# Patient Record
Sex: MALE | Race: WHITE | Employment: OTHER | URBAN - METROPOLITAN AREA
[De-identification: names, ages, dates, MRNs, and addresses within clinical notes are randomized per-mention and may not be internally consistent; named-entity substitution may affect disease eponyms.]

---

## 2021-04-07 ENCOUNTER — HOSPITAL ENCOUNTER (OUTPATIENT)
Dept: WOUND CARE | Age: 66
Discharge: HOME | End: 2021-04-07
Attending: PODIATRIST
Payer: MEDICARE

## 2021-04-07 VITALS
BODY MASS INDEX: 22.2 KG/M2 | RESPIRATION RATE: 18 BRPM | WEIGHT: 130 LBS | DIASTOLIC BLOOD PRESSURE: 71 MMHG | SYSTOLIC BLOOD PRESSURE: 136 MMHG | HEIGHT: 64 IN | HEART RATE: 90 BPM | TEMPERATURE: 99.1 F

## 2021-04-07 DIAGNOSIS — L97.929 IDIOPATHIC CHRONIC VENOUS HYPERTENSION OF LEFT LOWER EXTREMITY WITH ULCER AND INFLAMMATION (HCC): ICD-10-CM

## 2021-04-07 DIAGNOSIS — I87.332 IDIOPATHIC CHRONIC VENOUS HYPERTENSION OF LEFT LOWER EXTREMITY WITH ULCER AND INFLAMMATION (HCC): ICD-10-CM

## 2021-04-07 DIAGNOSIS — I87.331 IDIOPATHIC CHRONIC VENOUS HYPERTENSION OF RIGHT LOWER EXTREMITY WITH ULCER AND INFLAMMATION (HCC): Primary | ICD-10-CM

## 2021-04-07 DIAGNOSIS — L97.412 ULCER OF RIGHT HEEL, WITH FAT LAYER EXPOSED (HCC): ICD-10-CM

## 2021-04-07 DIAGNOSIS — L97.422 ULCER OF LEFT HEEL, WITH FAT LAYER EXPOSED (HCC): ICD-10-CM

## 2021-04-07 DIAGNOSIS — L97.919 IDIOPATHIC CHRONIC VENOUS HYPERTENSION OF RIGHT LOWER EXTREMITY WITH ULCER AND INFLAMMATION (HCC): Primary | ICD-10-CM

## 2021-04-07 PROCEDURE — 99213 OFFICE O/P EST LOW 20 MIN: CPT

## 2021-04-07 RX ORDER — SILVER SULFADIAZINE 10 G/1000G
CREAM TOPICAL ONCE
Status: COMPLETED | OUTPATIENT
Start: 2021-04-07 | End: 2021-04-07

## 2021-04-07 RX ADMIN — SILVER SULFADIAZINE: 10 CREAM TOPICAL at 12:53

## 2021-04-07 NOTE — PROGRESS NOTES
[address] [place] [address] [address], [address] [facility] RECORD NUMBER:  [id number]  AGE: 60 y.o. RACE WHITE  GENDER: male  : 1955  EPISODE DATE:  2021    Subjective:     Chief Complaint   Patient presents with    Foot Problem     bilateral feet (PVD with ulcers)        Cherelle Naik is a 60 y.o. WHITE male who presents with a wound to the bilateral lower extremity - right foot to the dorsal midfoot and lateral heel, left foot at the dorsal midfoot. This began \"years ago\". He has MS and is wheelchair bound with fixed flexion of the right knee. He has home PT to help with this. He was evaluated by a vascular team (patient unsure which) and recommended stenting bilateral leg due to arterial disease. He refused and is getting a second opinion from another location next week. He does not use compression and notes significant edema that is improved with elevation. He uses silvadene cream to all areas and is placing it very thick. He performs this himself. HISTORY of PRESENT ILLNESS HPI    Nature: Painless  Location: as above  Duration: years  Onset: Patient states it started as unknown  Course: stbale  Aggravating/Alleviating: Worsened with dependency, improved with rest  Treatment: silvadene    Ulcer Identification:  Ulcer Type: venous    Contributing Factors: venous stasis, decreased mobility and arterial insufficiency    Wound: mixed venous and arterial         PAST MEDICAL HISTORY    Past Medical History:   Diagnosis Date    Chronic obstructive pulmonary disease ([address]     Ill-defined condition     MS        PAST SURGICAL HISTORY    History reviewed. No pertinent surgical history.     FAMILY HISTORY    Family History   Problem Relation Age of Onset    Stroke Mother        SOCIAL HISTORY    Social History     Tobacco Use    Smoking status: Former Smoker     Quit date: 2020     Years since quittin.3    Smokeless tobacco: Never Used   Substance Use Topics    Alcohol use: Never     Frequency: Never    Drug use: Not Currently       ALLERGIES    No Known Allergies    MEDICATIONS    No current outpatient medications on file prior to encounter. No current facility-administered medications on file prior to encounter. REVIEW OF SYSTEMS    Pertinent items are noted in HPI. Objective:     Visit Vitals  /71 (BP 1 Location: Right upper arm, BP Patient Position: Sitting)   Pulse 90   Temp 99.1 °F (37.3 °C)   Resp 18   Ht 5' 3.5\" (1.613 m)   Wt 59 kg (130 lb)   BMI 22.67 kg/m²       Wt Readings from Last 3 Encounters:   04/07/21 59 kg (130 lb)       PHYSICAL EXAM    General: well developed, well nourished, pleasant , NAD. Hygiene good  Psych: cooperative. Pleasant. No anxiety or depression. Normal mood and affect. HEENT: Normocephalic, atraumatic. EOMI. Conjunctiva clear. No scleral icterus. Neck: Normal range of motion. No masses. Chest: Good air entry bilaterally. Respirations nonlabored  Cardio: Normal heart sounds,no rubs, murmurs or gallops  Abdomen: Soft, nontender, nondistended, normoactive bowel sounds    Vascular: DP and PT pulses are nonpalpable at 0/4 bilateral. Skin temperature is warm to cold from proximal to distal bilateral. Hair growth is absent bilateral. No erythema, edema, heat is appreciated bilateral. No evidence of cellulitis. Derm: Nails 1-5 bilateral are thickened, discolored and with subungual debris. No paronychial infections are noted. Skin is atrophic and xerotic. No subcutaneous masses or hyperpigmented lesions are present. Neuro: Epicritic sensation is present bilateral. Protective sensation is absent with 5.07 SWMF testing to all 10 sites bilateral. Muscle tone and bulk is symmetric bilateral.  Msk:  ROM of all joints is full and fluid without pain. No effusions are palpable. Full thickness ulceration is noted to the  Dorsal midfoot bilateral and to the right lateral heel. Normal rim with fibrous base. No erythema, focal edema, purulence or odor. Soft end feel with no bone exposed or palpable. No undermining or sinus track is noted. No fluctuance with palpation. Global dependent edema bilateral.     Wound Foot Anterior; Left #1 left dorsal foot 04/07/21 (Active)   Wound Image   04/07/21 1130   Wound Etiology Arterial 04/07/21 1130   Dressing Status Clean;Dry; Intact 04/07/21 1130   Cleansed Soap and water 04/07/21 1130   Wound Length (cm) 3 cm 04/07/21 1130   Wound Width (cm) 6 cm 04/07/21 1130   Wound Depth (cm) 0.1 cm 04/07/21 1130   Wound Surface Area (cm^2) 18 cm^2 04/07/21 1130   Wound Volume (cm^3) 1.8 cm^3 04/07/21 1130   Wound Assessment [facility] 04/07/21 1130   Drainage Amount Moderate 04/07/21 1130   Drainage Description Serous 04/07/21 1130   Wound Odor None 04/07/21 1130   Hayde-Wound/Incision Assessment Edematous; Blanchable erythema 04/07/21 1130   Wound Thickness Description Full thickness 04/07/21 1130   Number of days: 0       Wound Foot Anterior;Right #2 right dorsal foot to include toes 04/07/21 (Active)   Wound Image   04/07/21 1130   Wound Etiology Arterial 04/07/21 1130   Dressing Status Clean;Dry; Intact 04/07/21 1130   Cleansed Soap and water 04/07/21 1130   Wound Length (cm) 2 cm 04/07/21 1130   Wound Width (cm) 1.5 cm 04/07/21 1130   Wound Depth (cm) 0.1 cm 04/07/21 1130   Wound Surface Area (cm^2) 3 cm^2 04/07/21 1130   Wound Volume (cm^3) 0.3 cm^3 04/07/21 1130   Drainage Amount Moderate 04/07/21 1130   Drainage Description Serous 04/07/21 1130   Wound Odor None 04/07/21 1130   Hayde-Wound/Incision Assessment Edematous; Blanchable erythema 04/07/21 1130   Wound Thickness Description Full thickness 04/07/21 1130   Number of days: 0       Wound Foot Anterior;Right #3 right lateral foot/heel 04/07/21 (Active)   Wound Image   04/07/21 1130   Wound Etiology Arterial 04/07/21 1130   Dressing Status Clean;Dry; Intact 04/07/21 1130   Cleansed Soap and water 04/07/21 1130 Wound Length (cm) 5 cm 04/07/21 1130   Wound Width (cm) 1.5 cm 04/07/21 1130   Wound Depth (cm) 0.1 cm 04/07/21 1130   Wound Surface Area (cm^2) 7.5 cm^2 04/07/21 1130   Wound Volume (cm^3) 0.75 cm^3 04/07/21 1130   Wound Assessment Granulation tissue; Epithelialization 04/07/21 1130   Drainage Amount Moderate 04/07/21 1130   Drainage Description Serous 04/07/21 1130   Wound Odor None 04/07/21 1130   Hayde-Wound/Incision Assessment Edematous; Blanchable erythema 04/07/21 1130   Wound Thickness Description Full thickness 04/07/21 1130   Number of days: 0       DATA REVIEW:  No results found for this or any previous visit (from the past 336 hour(s)). Assessment/Plan:      Problem List Items Addressed This Visit        Circulatory    Idiopathic chronic venous hypertension of right lower extremity with ulcer and inflammation ([address] - Primary    Idiopathic chronic venous hypertension of left lower extremity with ulcer and inflammation (HCC)       Other    Ulcer of right heel, with fat layer exposed ([address]    Ulcer of left heel, with fat layer exposed ([address]        Patient examined and evaluated. Educated patient and barriers to healing. Encouraged him to follow through with his second opinion and have intervention if recommended. Edema control through compression can not be performed until adequate arterial supply is obtained to avoid harm. Without compression his wounds will be slow to heal if at all. Infection control is required to prevent loss of limb/life - maintain dressing coverage at all times, do not soak or get wet in the shower, wash hands before and after dressing changes. Monitor for erythema, edema, odor, and thick yellow drainage and contact the center immediately if noted. Proper nutrition to support skin growth includes increased protein, vitamins and amino acids - animal based foods and Kulwinder recommended.  Moisture management to prevent maceration and dryness - keep wound covered at all times outside dressing care, do not \"air out\" or soak. Dressing will consist of hydrofera ready three times a week. Patient instructed on the following: Follow all wound dressing instructions. Do not get dressing or wound wet. May shower if wound can be effectively kept dry. Cast covers may be purchased at [facility] and \[facility] [place] [facility]\"". Should you experience increased redness, swelling, pain, foul odor, size of wound(s), or have a temperature over 101 degrees please contact the [address] [place] [address] Road at [phone] or if after hours contact your primary care physician or go to the hospital emergency department. Orders Placed This Encounter    WOUND CARE, DRESSING CHANGE     Right dorsal foot/ lateral foot to include heel and Left dorsal foot. Cleanse wound and periwound with wound cleanser or normal saline. Hydrofera Ready: Cut to wound size, place in wound bed, shiny side out. Secure with abd and rolled gauze. Change 3 x weekly ( Monday, Wednesday and Friday)    May apply small amount of silvadene cream to any open areas on toes of both feel. Secure with gauze. Admit to home health for 2 to 3 x weekly dressing changes. Follow up with Vascular appointment as previously scheduled.      Standing Status:   Standing     Number of Occurrences:   1    silver sulfADIAZINE (SILVADENE) 1 % topical cream       Follow-up Information     Follow up With Specialties Details Why Contact Info     [facility] In 1 week  [facility] [address] [facility] [address] [phone]         MDM  Number of Diagnoses or Management Options  Idiopathic chronic venous hypertension of left lower extremity with ulcer and inflammation ([address]): new, no workup  Idiopathic chronic venous hypertension of right lower extremity with ulcer and inflammation ([address]): new, no workup  Ulcer of left heel, with fat layer exposed ([address]): new, no workup  Ulcer of right heel, with fat layer exposed ([address]): new, no workup     Amount and/or Complexity of Data Reviewed  Obtain history from someone other than the patient: yes    Risk of Complications, Morbidity, and/or Mortality  Presenting problems: moderate  Management options: moderate    Patient Progress  Patient progress: (new)      Treatment Note please see attached Discharge Instructions    Written patient dismissal instructions given to patient or POA.          Electronically signed by Mckenzie Scales DPM on 4/7/2021 at 12:05 PM

## 2021-04-07 NOTE — WOUND CARE
[address]  [address], [address] [facility]  Phone: [phone]  Fax: [phone]    Patient: Brice Ballesteros MRN: [id number]  SSN: [id number]    YOB: 1955  Age: 60 y.o. Sex: male       Return Appointment: 1 week with Chet Segura DPM    Instructions: Right dorsal foot/ lateral foot to include heel and Left dorsal foot. Cleanse wound and periwound with wound cleanser or normal saline. Hydrofera Ready: Cut to wound size, place in wound bed, shiny side out. Secure with abd and rolled gauze. Change 3 x weekly ( Monday, Wednesday and Friday)    May apply small amount of silvadene cream to any open areas on toes of both feel. Secure with gauze. Admit to home health for 2 to 3 x weekly dressing changes. Follow up with Vascular appointment as previously scheduled. Should you experience increased redness, swelling, pain, foul odor, size of wound(s), or have a temperature over 101 degrees please contact the [address] [place] [address] Road at [phone] or if after hours contact your primary care physician or go to the hospital emergency department.     Signed By: Karyle Hartigan     April 7, 2021

## 2021-04-07 NOTE — DISCHARGE INSTRUCTIONS
[place] [address]  [address], [address]  Phone: [phone]  Fax: [phone]    Patient: Casey Suggs MRN: [id number]  SSN: [id number]    YOB: 1955  Age: 60 y.o. Sex: male       Return Appointment: 1 week with Nikko Burroughs DPM    Instructions: Right dorsal foot/ lateral foot to include heel and Left dorsal foot. Cleanse wound and periwound with wound cleanser or normal saline. Hydrofera Ready: Cut to wound size, place in wound bed, shiny side out. Secure with abd and rolled gauze. Change 3 x weekly ( Monday, Wednesday and Friday)    May apply small amount of silvadene cream to any open areas on toes of both feel. Secure with gauze. Admit to home health for 2 to 3 x weekly dressing changes. Follow up with Vascular appointment as previously scheduled. Should you experience increased redness, swelling, pain, foul odor, size of wound(s), or have a temperature over 101 degrees please contact the [address] [place] [address] Road at [phone] or if after hours contact your primary care physician or go to the hospital emergency department.     Signed By: Johny Ann     April 7, 2021

## 2021-05-26 ENCOUNTER — TELEPHONE (OUTPATIENT)
Dept: WOUND CARE | Age: 66
End: 2021-05-26

## 2021-05-26 NOTE — TELEPHONE ENCOUNTER
Rec'd VM from [facility] with [facility], stating that patient's insurance only allows for 1x weekly visits. A Caregiver is in the home and is to change on alternate days, however the patient has refused to have the hydrofera blue changed resulting in the dressing staying on for 5 days.

## 2021-09-29 ENCOUNTER — HOSPITAL ENCOUNTER (OUTPATIENT)
Dept: WOUND CARE | Age: 66
Discharge: HOME | End: 2021-09-29
Attending: PODIATRIST

## 2021-09-29 VITALS
WEIGHT: 130 LBS | HEART RATE: 90 BPM | HEIGHT: 64 IN | DIASTOLIC BLOOD PRESSURE: 74 MMHG | RESPIRATION RATE: 18 BRPM | SYSTOLIC BLOOD PRESSURE: 137 MMHG | TEMPERATURE: 97.2 F | OXYGEN SATURATION: 96 % | BODY MASS INDEX: 22.2 KG/M2

## 2021-09-29 NOTE — WOUND CARE
[address]  [address], [address]  Phone: [phone]  Fax: [phone]    Patient: Mckinley Ramos MRN: [id number]  SSN: [id number]    YOB: 1955  Age: 73 y.o. Sex: male         Patient Redirected to Appointment:    Patient had previously scheduled appointment with Rose for 09/29/2021 at [facility].  Phone Call made to Dr. Ashley Land office to confirm [facility] appointment date and time. Dr. Gilson Cole Specialist encouraged patient to still come today. Address and phone number provided to patient. Wound Visit conlcluded with light cover dressing to dorsal foot.           Signed By: Maddy Mayorga RN     September 29, 2021

## 2021-09-29 NOTE — WOUND CARE
09/29/21 1326   Wound Foot Anterior; Left #1 left dorsal foot 04/07/21   Date First Assessed/Time First Assessed: 04/07/21 1130   Present on Hospital Admission: Yes  Location: Foot  Wound Location Orientation: Anterior; Left  Wound Description: #1 left dorsal foot  Date of First Observation: 04/07/21   Wound Image    Wound Etiology Arterial   Dressing Status Old drainage noted   Cleansed Cleansed with saline; Soap and water   Wound Length (cm) 4 cm   Wound Width (cm) 3 cm   Wound Depth (cm) 0.1 cm   Wound Surface Area (cm^2) 12 cm^2   Change in Wound Size % 33.33   Wound Volume (cm^3) 1.2 cm^3   Wound Healing % 33   Wound Assessment Erythema;Granulation tissue   Drainage Amount Small   Drainage Description Serosanguinous   Wound Odor None   Hayde-Wound/Incision Assessment Blanchable erythema;Edematous   Edges Attached edges   Wound Thickness Description Full thickness     Patient is not on ANTICOAGULANT. Wound mechanically debrided with gauze and normal saline.

## 2021-11-09 ENCOUNTER — HOSPITAL ENCOUNTER (OUTPATIENT)
Dept: WOUND CARE | Age: 66
Discharge: HOME | End: 2021-11-09
Attending: PODIATRIST
Payer: MEDICARE

## 2021-11-09 VITALS
OXYGEN SATURATION: 96 % | RESPIRATION RATE: 18 BRPM | WEIGHT: 130 LBS | TEMPERATURE: 96.1 F | BODY MASS INDEX: 22.2 KG/M2 | DIASTOLIC BLOOD PRESSURE: 76 MMHG | HEART RATE: 90 BPM | HEIGHT: 64 IN | SYSTOLIC BLOOD PRESSURE: 154 MMHG

## 2021-11-09 DIAGNOSIS — L97.929 IDIOPATHIC CHRONIC VENOUS HYPERTENSION OF LEFT LOWER EXTREMITY WITH ULCER AND INFLAMMATION (HCC): ICD-10-CM

## 2021-11-09 DIAGNOSIS — L97.422 ULCER OF LEFT HEEL, WITH FAT LAYER EXPOSED (HCC): ICD-10-CM

## 2021-11-09 DIAGNOSIS — L97.919 IDIOPATHIC CHRONIC VENOUS HYPERTENSION OF RIGHT LOWER EXTREMITY WITH ULCER AND INFLAMMATION (HCC): ICD-10-CM

## 2021-11-09 DIAGNOSIS — L97.412 ULCER OF RIGHT HEEL, WITH FAT LAYER EXPOSED (HCC): ICD-10-CM

## 2021-11-09 DIAGNOSIS — G35 MULTIPLE SCLEROSIS (HCC): ICD-10-CM

## 2021-11-09 DIAGNOSIS — I87.331 IDIOPATHIC CHRONIC VENOUS HYPERTENSION OF RIGHT LOWER EXTREMITY WITH ULCER AND INFLAMMATION (HCC): ICD-10-CM

## 2021-11-09 DIAGNOSIS — I87.332 IDIOPATHIC CHRONIC VENOUS HYPERTENSION OF LEFT LOWER EXTREMITY WITH ULCER AND INFLAMMATION (HCC): ICD-10-CM

## 2021-11-09 PROCEDURE — 99213 OFFICE O/P EST LOW 20 MIN: CPT

## 2021-11-09 PROCEDURE — 99203 OFFICE O/P NEW LOW 30 MIN: CPT | Performed by: PHYSICAL MEDICINE & REHABILITATION

## 2021-11-09 RX ORDER — ASPIRIN 325 MG
325 TABLET ORAL DAILY
COMMUNITY

## 2021-11-09 NOTE — PROGRESS NOTES
[address], [address], [address] [place] [address] [address]  [phone]    Progress Notes   Avery Juares       MRN: [id number]     : 1955     Age: 73 y.o. Subjective/HPI:   This patient is a 73 y.o. male seen and evaluated at the wound clinic for left dorsal foot chronic ulcerations. He was referred to us by Dr. Brittani Morris at [facility] care where he was just seen recently 2021. Dr. Brittani Morris has followed this patient along with home health nursing for care of his left recurrent dorsal foot ulcerations. He has had no recent fever or chills but he states that the ulcerations have been intermittent since . He admits that he spends most of his time up in a power wheelchair without elevating leg rest.  He does not like to wear compression. He does not sleep in his bed and frequently just sleeps in his chair. He cannot quantify how many hours a day that he is up in his chair but he states that it is upon awakening and at bedtime or sometimes he sleeps in it 24 hours. He has a history of multiple sclerosis and is a nonambulator. He has a history of peripheral arterial disease followed by Dr Brittani Morris. Smoking approximately 1 year ago. On health has been applying Hydrofera Blue to the wound on the left dorsal foot but the patient complains that it sticks frequently and causes further skin trauma sometimes. .    Review of Systems  A comprehensive review of systems was negative except for that written in the HPI. Past Medical History:   Diagnosis Date    Chronic obstructive pulmonary disease ([facility] [address]     Ill-defined condition     MS      History reviewed. No pertinent surgical history.    No Known Allergies   Social History     Tobacco Use    Smoking status: Former Smoker     Quit date: 2020     Years since quittin.9    Smokeless tobacco: Never Used    Tobacco comment: continues non-smoking status   Substance Use Topics    Alcohol use: Never Social History     Social History Narrative    Not on file     Family History   Problem Relation Age of Onset    Stroke Mother       Cannot display prior to admission medications because the patient has not been admitted in this contact. Current Outpatient Medications   Medication Sig    aspirin (ASPIRIN) 325 mg tablet Take 325 mg by mouth daily. No current facility-administered medications for this encounter. Objective:     Vitals:    11/09/21 1131   BP: (!) 154/76   Pulse: 90   Resp: 18   Temp: (!) 96.1 °F (35.6 °C)   SpO2: 96%   Weight: 130 lb (59 kg)   Height: 5' 3.5\" (1.613 m)       Physical Exam:  BMI: 22.67  Ambulation: Nonambulatory  Gen- the patient is well developed and in no acute distress  Lungs- normal respiratory effort. Cardiovascular:  Lower limb pulses: Difficult to palpate due to the location of the left foot wound. .   Abd- soft and no masses evident. Ext- warm without cyanosis. There is chronic dependent lower leg edema that appears uncontrolled. Skin- no jaundice or rashes. Please see the specific measurements and descriptions of the wound(s) below. There is no evidence of periwound induration or warmth. No purulence or odor. Neuro- alert and oriented x 3. He has increased spasticity in the lower extremities with sustained clonus at times at the ankles. He is followed by neurology at [facility]. Psychological: Affect normal. Mood normal. Memory intact. Wound Foot Anterior; Left #1 left dorsal foot 04/07/21 (Active)   Wound Image   11/09/21 1146   Wound Etiology Venous 11/09/21 1146   Dressing Status Old drainage noted 11/09/21 1146   Cleansed Cleansed with saline;  Soap and water 11/09/21 1146   Dressing/Treatment Open to air 11/09/21 1146   Wound Length (cm) 2.6 cm 11/09/21 1146   Wound Width (cm) 3.5 cm 11/09/21 1146   Wound Depth (cm) 0.2 cm 11/09/21 1146   Wound Surface Area (cm^2) 9.1 cm^2 11/09/21 1146   Change in Wound Size % 49.44 11/09/21 1146   Wound Volume (cm^3) 1.82 cm^3 11/09/21 1146   Wound Healing % -1 11/09/21 1146   Wound Assessment Granulation tissue; [facility] 11/09/21 1146   Drainage Amount Scant 11/09/21 1146   Drainage Description Serosanguinous 11/09/21 1146   Wound Odor None 11/09/21 1146   Hayde-Wound/Incision Assessment Blanchable erythema; Edematous 11/09/21 1146   Edges Attached edges 11/09/21 1146   Wound Thickness Description Full thickness 11/09/21 1146   Number of days: 216           Vascular Lower Extremities Arterial Duplex Procedure 12/23/2020-[facility]     Demographics      Patient Name:            DIANA VACA       Age:          65 year(s)      MRN:                     [id number]            Date of       1955                                                  Birth:      Account Number:         [de-identified]         Gender:       Male      Corporate ID:            [id number]            Accession     [id number]                                                  Number:      Height:                  63 inches            Weight:       131 Lb      Date of Study:           12/23/2020      Study Location:          [facility] Number:  [id number]                             CENTER.      Referring Physician:     AXEL DOMINGUEZ Physician:                             ELHAM VILLALOBOS      Interpreting/Reading     **[facility]     Vascular     [facility]    Physician:               Juan Carlos Barrios MD      Tech:     Procedure   Type of Study:      Extremities Arteries: Lower Extremities Arterial Duplex, US Doppler    Arterial Legs Bilateral with TRICE. Risk Factors       - The patient has a current/recent (within 1 year) tobacco history. Exam Status:Routine. Study [address] Technical Quality:Technically difficult study. .      Conclusions      Summary      Limited Exam...pls refer to tech notes.      Occluded right mid SFA      Elevated velocities on left proximal and distal SFA suggestive of moderate    stenosis      Signature      ----------------------------------------------------------------    Electronically signed by Latoya Armando MD(Reading Physician) on    12/23/2020 04:46 PM    ----------------------------------------------------------------     Technical Comments   Right Impression   Duplex imaging of the right lower extremity demonstrates atherosclerotic   plqaue and intimal thickening. Color flow and Doppler signals are absent in the right mid superficial   femoral artery. Elevated velocities noted in the right distal external iliac artery and deep   femoral artery. Doppler waveforms are biphasic and monophasic throughout at rest.   Left Impression   Duplex imaging of the left lower extremity demonstrates atherosclerotic   plqaue and intimal thickening. Elevated velocities noted in the left distal external iliac artery and in   the proximal and distal superficial femoral arteries. Doppler waveforms are triphasic and biphasic throughout at rest.     Velocities are measured in cm/s ; Diameters are measured in cm     LE Duplex Measurements     +------------------++-----+-----+----+----------++---+-----+---+-----------+   !                  ! ! Right!     !Left!          !!   !     !   !           !   +------------------++-----+-----+----+----------++---+-----+---+-----------+   ! Location          !!PSV  !Ratio! EDV ! Wave Desc. !!PSV! Ratio! EDV! Wave Desc. !   +------------------++-----+-----+----+----------++---+-----+---+-----------+   ! Dist EIA          !!182  !     !    !Biphasic  !!173!     !   !Biphasic   !   +------------------++-----+-----+----+----------++---+-----+---+-----------+   ! Common Femoral    !!131  !     !    !Biphasic  !!110!     !   !Biphasic   !   +------------------++-----+-----+----+----------++---+-----+---+-----------+   ! PFA               !!246  !1.88 !    !Biphasic  !!90 !0.82 !   !Triphasic  ! +------------------++-----+-----+----+----------++---+-----+---+-----------+   ! Prox SFA          !!80   !     !    !Biphasic  !!856!     !   !Biphasic   !   +------------------++-----+-----+----+----------++---+-----+---+-----------+   ! Mid SFA           !!0    !0    !    !Absent    !!139!0.57 !   !Biphasic   !   +------------------++-----+-----+----+----------++---+-----+---+-----------+   ! Dist SFA          !!172  !     !    !Biphasic  !!506!5.45 !   !Biphasic   !   +------------------++-----+-----+----+----------++---+-----+---+-----------+   ! Prox Popliteal    !!422  !0.72 !    !Monophasic!!158!0.83 !   !Triphasic  !   +------------------++-----+-----+----+----------++---+-----+---+-----------+   ! Dist Popliteal    !!69   !0.56 !    !Monophasic!!124!0.78 !   !Biphasic   !   +------------------++-----+-----+----+----------++---+-----+---+-----------+   ! Prox PTA          !!42   !0.61 !    !Monophasic!!94 !0.76 !   !Biphasic   !   +------------------++-----+-----+----+----------++---+-----+---+-----------+   ! Dist PTA          !!47   !1.12 !    !Monophasic!!128!1.36 !   !Biphasic   !   +------------------++-----+-----+----+----------++---+-----+---+-----------+   ! Prox TAMMY          !!44   !0.64 !    !Monophasic!!60 !0.48 !   !Biphasic   !   +------------------++-----+-----+----+----------++---+-----+---+-----------+   ! DP                !!35   !     !    !Monophasic! !63 !     !   !Biphasic   !   +------------------++-----+-----+----+----------++---+-----+---+-----------+   ! Prox Peroneal     !!     !     !    !          !!71 !0.76 !   !Biphasic   !   +------------------++-----+-----+----+----------++---+-----+---+-----------+    Other Result Text    This result has an attachment that is not available. Interface, Radiology Results Cupid - 12/23/2020  4:46 PM EST   Vascular Lower Extremities Arterial Duplex Procedure      Demographics      Patient Name:            DIANA VACA       Age:          65 year(s)  MRN:                     [id number]            Date of       1955                                                  Birth:      Account Number:         [de-identified]         Gender:       Male      Corporate ID:            [id number]            Accession     [id number]                                                  Number:      Height:                  63 inches            Weight:       131 Lb      Date of Study:           12/23/2020      Study Location:          [facility] Number:  [id number]                             CENTER.      Referring Physician:     AXEL DOMINGUEZ Physician:                             Sesar Vazquez MD      Interpreting/Reading     **[facility]     Vascular     [facility] Brass    Physician:               Sarah Jung MD      Tech:     Procedure   Type of Study:      Extremities Arteries: Lower Extremities Arterial Duplex, US Doppler    Arterial Legs Bilateral with TRICE. Risk Factors       - The patient has a current/recent (within 1 year) tobacco history. Exam Status:Routine. Study [address] Technical Quality:Technically difficult study. .      Conclusions      Summary      Limited Exam...pls refer to tech notes.      Occluded right mid SFA      Elevated velocities on left proximal and distal SFA suggestive of moderate    stenosis      Signature       Assessment:     Patient Active Problem List   Diagnosis Code    Idiopathic chronic venous hypertension of right lower extremity with ulcer and inflammation ([address] I87.331, L97.919    Idiopathic chronic venous hypertension of left lower extremity with ulcer and inflammation (HCC) I87.332, L97.929    Ulcer of right heel, with fat layer exposed ([address] L97.412    Ulcer of left heel, with fat layer exposed ([address] L97.422     Plan:   Return Appointment: 4 weeks with Juan R Curtis MD     Instructions: Left Lower Leg:  Cleanse wound and periwound with wound cleanser or normal saline.   Zinc Oxide 40% Cream such as Desitin to periwound. Alginate with silver(sheets); cut to size, apply to wound bed. Cover with ABD, wrap with Rolled Gauze  Dressing change 3x weekly  [facility] for wound care. Tubigrip to both lower legs  Elevate lower legs when at rest.   Avoid sitting in chair with legs down all day. Avoid sleeping in chair. Sleep in bed with legs elevated. Wear Compression Socks. May find at [employer]' stores locally, such as [employer], [employer], [employer], etc.  Continue with Meals on Wheels. Focus on protein intake.       Follow-up Information     Follow up With Specialties Details Why Contact Info    [address] In 4 weeks For wound re-check [address] [facility] [address] [phone]        This patient has a history of multiple sclerosis and is nonambulatory and has significant lower limb spasticity that may also contribute to recurrent ulcerations. He uses a power wheelchair with a dependent foot plate and admits that he does not elevate his legs. I have strongly advised that if he wants to heal the wounds making a plan to transfer into his bed nightly and elevating his legs on the bed is essential.  I have also advised that he could use light compression knee-high socks such as those found at [employer] or in a sporting [employer] store to give him some compression that is still easy enough for donning and doffing. I will switch him over to a silver alginate on the wound bed and a barrier cream around that and elevation and I will see him back in 4 weeks. TIME:  If total time for today's E/M service is used for level of service it is documented below.     This time includes physician non-face-to-face service time visit on the date of service and includes    Preparing to see the patient (eg, review of tests)  Obtaining and/or reviewing separately obtained history  Performing a medically necessary appropriate examination and/or evaluation  Counseling and educating the patient/family/caregiver  Ordering medications, tests, or procedures  Referring and communicating with other health care professionals as needed  Documenting clinical information in the electronic or other health record  Independently interpreting results (not reported separately) and communicating results to the patient/family/caregiver  Care coordination (not reported separately)    E/M time =    38      minutes. Dictated using voice recognition software. Proofread, but unrecognized errors may exist.       Thank you very much for the opportunity to participate in this patient's care.       Signed By: Willi Schafer MD     November 9, 2021

## 2021-11-09 NOTE — WOUND CARE
[address]  [address], [address]  Phone: [phone]  Fax: [phone]    Patient: Jose G Hogan MRN: [id number]  SSN: [id number]    YOB: 1955  Age: 73 y.o. Sex: male       Return Appointment: 4 weeks with Katherin Olivera MD    Instructions: Left Lower Leg:  Cleanse wound and periwound with wound cleanser or normal saline. Zinc Oxide 40% Cream such as Desitin to periwound. Alginate with silver(sheets); cut to size, apply to wound bed. Cover with ABD, wrap with Rolled Gauze  Dressing change 3x weekly  [facility] for wound care. Tubigrip to both lower legs  Elevate lower legs when at rest.   Avoid sitting in chair with legs down all day. Avoid sleeping in chair. Sleep in bed with legs elevated. Wear Compression Socks. May find at [employer] stores locally, such as [employer], [employer], [employer], etc.  Continue with Meals on Wheels. Focus on protein intake. Should you experience increased redness, swelling, pain, foul odor, size of wound(s), or have a temperature over 101 degrees please contact the [address] at [phone] or if after hours contact your primary care physician or go to the hospital emergency department.     Signed By: Rehan Archer RN     November 9, 2021

## 2021-11-09 NOTE — DISCHARGE INSTRUCTIONS
[address]  [address], [address]   Phone: [phone]  Fax: [phone]    Patient: Sabrina Charles MRN: [id number]  SSN: [id number]    YOB: 1955  Age: 73 y.o. Sex: male       Return Appointment: 4 weeks with Halie Batista MD    Instructions: Left Lower Leg:  Cleanse wound and periwound with wound cleanser or normal saline. Zinc Oxide 40% Cream such as Desitin to periwound. Alginate with silver(sheets); cut to size, apply to wound bed. Cover with ABD, wrap with Rolled Gauze  Dressing change 3x weekly  [facility] for wound care. Tubigrip to both lower legs  Elevate lower legs when at rest.   Avoid sitting in chair with legs down all day. Avoid sleeping in chair. Sleep in bed with legs elevated. Wear Compression Socks. May find at [employer] stores locally, such as [employer], [employer], [employer], etc.  Continue with Meals on Wheels. Focus on protein intake. Should you experience increased redness, swelling, pain, foul odor, size of wound(s), or have a temperature over 101 degrees please contact the [address] at [phone] or if after hours contact your primary care physician or go to the hospital emergency department.     Signed By: Kimberley Johnston RN     November 9, 2021

## 2021-11-09 NOTE — WOUND CARE
11/09/21 1146   Wound Foot Anterior; Left #1 left dorsal foot 04/07/21   Date First Assessed/Time First Assessed: 04/07/21 1130   Present on Hospital Admission: Yes  Location: Foot  Wound Location Orientation: Anterior; Left  Wound Description: #1 left dorsal foot  Date of First Observation: 04/07/21   Wound Image    Wound Etiology Venous   Dressing Status Old drainage noted   Cleansed Cleansed with saline; Soap and water   Dressing/Treatment Open to air   Wound Length (cm) 2.6 cm   Wound Width (cm) 3.5 cm   Wound Depth (cm) 0.2 cm   Wound Surface Area (cm^2) 9.1 cm^2   Change in Wound Size % 49.44   Wound Volume (cm^3) 1.82 cm^3   Wound Healing % -1   Wound Assessment Granulation tissue; Slough   Drainage Amount Scant   Drainage Description Serosanguinous   Wound Odor None   Hayde-Wound/Incision Assessment Blanchable erythema; Edematous   Edges Attached edges   Wound Thickness Description Full thickness     Patient is taking ANTICOAGULANT:  Daily  Wound mechanically debrided with gauze and normal saline.

## 2022-01-04 ENCOUNTER — HOSPITAL ENCOUNTER (OUTPATIENT)
Dept: WOUND CARE | Age: 67
Discharge: HOME | End: 2022-01-04
Attending: PODIATRIST
Payer: MEDICARE

## 2022-01-04 VITALS
OXYGEN SATURATION: 93 % | RESPIRATION RATE: 18 BRPM | HEIGHT: 64 IN | WEIGHT: 130 LBS | SYSTOLIC BLOOD PRESSURE: 151 MMHG | TEMPERATURE: 97.9 F | HEART RATE: 88 BPM | DIASTOLIC BLOOD PRESSURE: 84 MMHG | BODY MASS INDEX: 22.2 KG/M2

## 2022-01-04 DIAGNOSIS — L97.919 IDIOPATHIC CHRONIC VENOUS HYPERTENSION OF RIGHT LOWER EXTREMITY WITH ULCER AND INFLAMMATION (HCC): ICD-10-CM

## 2022-01-04 DIAGNOSIS — G35 MS (MULTIPLE SCLEROSIS) (HCC): ICD-10-CM

## 2022-01-04 DIAGNOSIS — L97.929 IDIOPATHIC CHRONIC VENOUS HYPERTENSION OF LEFT LOWER EXTREMITY WITH ULCER AND INFLAMMATION (HCC): ICD-10-CM

## 2022-01-04 DIAGNOSIS — I87.331 IDIOPATHIC CHRONIC VENOUS HYPERTENSION OF RIGHT LOWER EXTREMITY WITH ULCER AND INFLAMMATION (HCC): ICD-10-CM

## 2022-01-04 DIAGNOSIS — L97.422 ULCER OF LEFT HEEL, WITH FAT LAYER EXPOSED (HCC): Primary | ICD-10-CM

## 2022-01-04 DIAGNOSIS — I73.9 PAD (PERIPHERAL ARTERY DISEASE) (HCC): ICD-10-CM

## 2022-01-04 DIAGNOSIS — L97.522 CHRONIC FOOT ULCER, LEFT, WITH FAT LAYER EXPOSED (HCC): ICD-10-CM

## 2022-01-04 DIAGNOSIS — I87.332 IDIOPATHIC CHRONIC VENOUS HYPERTENSION OF LEFT LOWER EXTREMITY WITH ULCER AND INFLAMMATION (HCC): ICD-10-CM

## 2022-01-04 DIAGNOSIS — L97.512 RIGHT FOOT ULCER, WITH FAT LAYER EXPOSED (HCC): ICD-10-CM

## 2022-01-04 PROBLEM — L97.412 ULCER OF RIGHT HEEL, WITH FAT LAYER EXPOSED (HCC): Status: RESOLVED | Noted: 2021-04-07 | Resolved: 2022-01-04

## 2022-01-04 PROCEDURE — 99213 OFFICE O/P EST LOW 20 MIN: CPT | Performed by: PHYSICAL MEDICINE & REHABILITATION

## 2022-01-04 PROCEDURE — 99213 OFFICE O/P EST LOW 20 MIN: CPT

## 2022-01-04 NOTE — PROGRESS NOTES
[address], [address], [address]  [phone]    Progress Notes   Estephanie Gray       MRN: [id number]     : 1955     Age: 73 y.o. Subjective/HPI:   This patient is a 73 y.o. male seen and evaluated at the wound clinic for recheck of the left dorsal foot ulceration. In the interim since last visit, he developed ulceration to the right dorsal foot also in the same  Location. He states that he frequently traumatizes his feet in his power wheelchair, striking them on furniture or getting them caught. He also has severe lower limb spasticity but will not take baclofen or other meds for spasticity. He lives alone and has home health nursing care for his wounds. No fever or chills. Review of Systems  A comprehensive review of systems was negative except for that written in the HPI. Past Medical History:   Diagnosis Date    Chronic obstructive pulmonary disease ([facility] [address]     Ill-defined condition     MS      History reviewed. No pertinent surgical history. No Known Allergies   Social History     Tobacco Use    Smoking status: Former Smoker     Quit date: 2020     Years since quittin.0    Smokeless tobacco: Never Used    Tobacco comment: continues non-smoking status   Substance Use Topics    Alcohol use: Never      Social History     Social History Narrative    Not on file     Family History   Problem Relation Age of Onset    Stroke Mother       Cannot display prior to admission medications because the patient has not been admitted in this contact. Current Outpatient Medications   Medication Sig    aspirin (ASPIRIN) 325 mg tablet Take 325 mg by mouth daily. No current facility-administered medications for this encounter.      Objective:     Vitals:    22 1021   BP: (!) 151/84   Pulse: 88   Resp: 18   Temp: 97.9 °F (36.6 °C)   SpO2: 93%   Weight: 130 lb (59 kg)   Height: 5' 3.5\" (1.613 m)       Physical Exam:  BMI: 22.67  Ambulation: non ambulatory  Gen- the patient is well developed and in no acute distress  Lungs- normal respiratory effort. Cardiovascular:  Lower limb pulses: cold with slow cap refill in the left foot. Abd- soft and no masses evident. Ext- warm without cyanosis. There is dependent lower leg edema that appears not controlled  Skin- no jaundice or rashes. Please see the specific measurements and descriptions of the wound(s) below. There is no evidence of periwound induration or warmth. No purulence or odor. Neuro- alert and oriented x 3. Gross imotor deficits are present. Lower limb sensation is impaired. Algis Broaden Psychological: Affect normal. Mood normal. Memory intact. Wound Foot Anterior; Left #1 left dorsal foot 04/07/21 (Active)   Wound Image   01/04/22 1023   Wound Etiology Venous 01/04/22 1023   Dressing Status Old drainage noted 01/04/22 1023   Cleansed Cleansed with saline 01/04/22 1023   Dressing/Treatment Open to air 11/09/21 1146   Wound Length (cm) 2.5 cm 01/04/22 1023   Wound Width (cm) 3 cm 01/04/22 1023   Wound Depth (cm) 0.1 cm 01/04/22 1023   Wound Surface Area (cm^2) 7.5 cm^2 01/04/22 1023   Change in Wound Size % 58.33 01/04/22 1023   Wound Volume (cm^3) 0.75 cm^3 01/04/22 1023   Wound Healing % 58 01/04/22 1023   Wound Assessment [place]/red;Slough 01/04/22 1023   Drainage Amount Scant 01/04/22 1023   Drainage Description Serosanguinous 01/04/22 1023   Wound Odor None 01/04/22 1023   Hayde-Wound/Incision Assessment Edematous; Cool 01/04/22 1023   Edges Attached edges 11/09/21 1146   Wound Thickness Description Full thickness 01/04/22 1023   Number of days: 272       Wound Foot Anterior;Right (Active)   Wound Image   01/04/22 1023   Wound Etiology Traumatic 01/04/22 1023   Cleansed Soap and water 01/04/22 1023   Dressing/Treatment Open to air 01/04/22 1023   Wound Length (cm) 0.8 cm 01/04/22 1023   Wound Width (cm) 0.9 cm 01/04/22 1023   Wound Depth (cm) 0.1 cm 01/04/22 1023   Wound Surface Area (cm^2) 0.72 cm^2 01/04/22 1023   Wound Volume (cm^3) 0.072 cm^3 01/04/22 1023   Wound Assessment Pink/red 01/04/22 1023   Drainage Amount Scant 01/04/22 1023   Drainage Description Serosanguinous 01/04/22 1023   Wound Odor None 01/04/22 1023   Hayde-Wound/Incision Assessment Cool;Ecchymosis;Edematous 01/04/22 1023   Wound Thickness Description Full thickness 01/04/22 1023   Number of days: 0     Assessment:     Patient Active Problem List   Diagnosis Code    Idiopathic chronic venous hypertension of right lower extremity with ulcer and inflammation ([address] I87.331, L97.919    Idiopathic chronic venous hypertension of left lower extremity with ulcer and inflammation ([facility]) I87.332, L97.929    Ulcer of right heel, with fat layer exposed ([address] L97.412    Ulcer of left heel, with fat layer exposed ([address] L97.422     Plan:       He has scaly skin with uncontrolled edema of the lower limbs. He has dependent erythema and edema that is probably vasogenic/neurogenic. He has PAD and would like to discuss stenting with another surgeon. We will refer him to [facility] vascular. Get updated arterial studies. He is now sleeping in his bed and is elevating his legs. He edema is better compared to previous visit. TIME:  If total time for today's E/M service is used for level of service it is documented below.     This time includes physician non-face-to-face service time visit on the date of service and includes    Preparing to see the patient (eg, review of tests)  Obtaining and/or reviewing separately obtained history  Performing a medically necessary appropriate examination and/or evaluation  Counseling and educating the patient/family/caregiver  Ordering medications, tests, or procedures  Referring and communicating with other health care professionals as needed  Documenting clinical information in the electronic or other health record  Independently interpreting results (not reported separately) and communicating results to the patient/family/caregiver  Care coordination (not reported separately)    E/M time =    26      minutes. Dictated using voice recognition software. Proofread, but unrecognized errors may exist.       Thank you very much for the opportunity to participate in this patient's care.       Signed By: Silke Bush MD     January 4, 2022

## 2022-01-04 NOTE — WOUND CARE
[address]  [address], [address] [facility]  Phone: [phone]  Fax: [phone]    Patient: Ricardo Preston MRN: [id number]  SSN: [id number]    YOB: 1955  Age: 73 y.o. Sex: male       Return Appointment: 4 weeks with Kathy Jackson MD    Instructions: Left and Right dorsal feet:  Cleanse wound and periwound with wound cleanser or normal saline. Zinc Oxide 40% Cream such as Desitin to periwound. Alginate with silver(sheets); cut to size, apply to wound bed. Cover with ABD, wrap with Rolled Gauze  Dressing change 3x weekly    [facility] for wound care. Tubigrip to both lower legs  Elevate lower legs when at rest.   Avoid sitting in chair with legs down all day. Continue to sleep in hospital bed    Continue with Meals on Wheels. Focus on protein intake. Should you experience increased redness, swelling, pain, foul odor, size of wound(s), or have a temperature over 101 degrees please contact the [address] [place] [address] Road at [phone] or if after hours contact your primary care physician or go to the hospital emergency department.     Signed By: Sharyl Dancer, PT, [facility]     January 4, 2022

## 2022-01-04 NOTE — WOUND CARE
01/04/22 1023   Wound Foot Anterior; Left #1 left dorsal foot 04/07/21   Date First Assessed/Time First Assessed: 04/07/21 1130   Present on Hospital Admission: Yes  Primary Wound Type: Venous Ulcer  Location: Foot  Wound Location Orientation: Anterior; Left  Wound Description: #1 left dorsal foot  Date of First Observation. .. Wound Image    Wound Etiology Venous   Dressing Status Old drainage noted   Cleansed Cleansed with saline   Dressing/Treatment   (desitin, algiante, abd, gauze)   Wound Length (cm) 2.5 cm   Wound Width (cm) 3 cm   Wound Depth (cm) 0.1 cm   Wound Surface Area (cm^2) 7.5 cm^2   Change in Wound Size % 58.33   Wound Volume (cm^3) 0.75 cm^3   Wound Healing % 58   Wound Assessment [place]/red;Slough   Drainage Amount Scant   Drainage Description Serosanguinous   Wound Odor None   Hayde-Wound/Incision Assessment Edematous; Cool   Wound Thickness Description Full thickness   Wound Foot Anterior;Right   Date First Assessed/Time First Assessed: 01/04/22 1026   Present on Hospital Admission: Yes  Primary Wound Type: Traumatic  Location: Foot  Wound Location Orientation: Anterior;Right   Wound Image    Wound Etiology Traumatic   Cleansed Soap and water   Dressing/Treatment Open to air   Wound Length (cm) 0.8 cm   Wound Width (cm) 0.9 cm   Wound Depth (cm) 0.1 cm   Wound Surface Area (cm^2) 0.72 cm^2   Wound Volume (cm^3) 0.072 cm^3   Wound Assessment Pink/red   Drainage Amount Scant   Drainage Description Serosanguinous   Wound Odor None   Hayde-Wound/Incision Assessment Cool;Ecchymosis;Edematous   Wound Thickness Description Full thickness   patient taking aspirin

## 2022-01-04 NOTE — DISCHARGE INSTRUCTIONS
[address]  [address], [address]   Phone: [phone]  Fax: [phone]    Patient: Cinthya Simmons MRN: [id number]  SSN: [id number]    YOB: 1955  Age: 73 y.o. Sex: male       Return Appointment: 4 weeks with Uma Atkinson MD    Instructions: Left and Right dorsal feet:  Cleanse wound and periwound with wound cleanser or normal saline. Zinc Oxide 40% Cream such as Desitin to periwound. Alginate with silver(sheets); cut to size, apply to wound bed. Cover with ABD, wrap with Rolled Gauze  Dressing change 3x weekly    [facility] for wound care. Tubigrip to both lower legs  Elevate lower legs when at rest.   Avoid sitting in chair with legs down all day. Continue to sleep in hospital bed    Continue with Meals on Wheels. Focus on protein intake. Should you experience increased redness, swelling, pain, foul odor, size of wound(s), or have a temperature over 101 degrees please contact the [address] [place] [address] Road at [phone] or if after hours contact your primary care physician or go to the hospital emergency department.     Signed By: Reynold Hung, PT, [address] [facility]     January 4, 2022

## 2022-02-11 ENCOUNTER — TELEPHONE (OUTPATIENT)
Dept: WOUND CARE | Age: 67
End: 2022-02-11

## 2022-02-11 NOTE — TELEPHONE ENCOUNTER
Aracelis, nurse from [address] [place] [address], stated that patient refused 3 visits this week and his insurance [facility]) has only approved 7 visits/9 weeks. She stated she would continue to contact patient and decreased patient to 1 time/week. She stated she would call back if patient refuses again. Also encouraged nurse to have patient call and make an appointment. He has cancelled or no-showed for the last 2 visits.

## 2022-03-06 ENCOUNTER — ANESTHESIA EVENT (OUTPATIENT)
Dept: SURGERY | Age: 67
End: 2022-03-06
Payer: MEDICARE

## 2022-03-07 ENCOUNTER — ANESTHESIA (OUTPATIENT)
Dept: SURGERY | Age: 67
End: 2022-03-07
Payer: MEDICARE

## 2022-03-07 ENCOUNTER — APPOINTMENT (OUTPATIENT)
Dept: INTERVENTIONAL RADIOLOGY/VASCULAR | Age: 67
End: 2022-03-07
Attending: SURGERY
Payer: MEDICARE

## 2022-03-07 ENCOUNTER — HOSPITAL ENCOUNTER (OUTPATIENT)
Age: 67
Setting detail: OUTPATIENT SURGERY
Discharge: HOME | End: 2022-03-07
Attending: SURGERY | Admitting: SURGERY
Payer: MEDICARE

## 2022-03-07 VITALS
HEART RATE: 97 BPM | OXYGEN SATURATION: 95 % | TEMPERATURE: 97.6 F | RESPIRATION RATE: 16 BRPM | DIASTOLIC BLOOD PRESSURE: 65 MMHG | SYSTOLIC BLOOD PRESSURE: 125 MMHG

## 2022-03-07 DIAGNOSIS — I87.332 IDIOPATHIC CHRONIC VENOUS HYPERTENSION OF LEFT LOWER EXTREMITY WITH ULCER AND INFLAMMATION (HCC): ICD-10-CM

## 2022-03-07 DIAGNOSIS — L97.929 IDIOPATHIC CHRONIC VENOUS HYPERTENSION OF LEFT LOWER EXTREMITY WITH ULCER AND INFLAMMATION (HCC): ICD-10-CM

## 2022-03-07 LAB
ANION GAP SERPL CALC-SCNC: 6 MMOL/L (ref 7–16)
BUN SERPL-MCNC: 21 MG/DL (ref 8–23)
CALCIUM SERPL-MCNC: 9.2 MG/DL (ref 8.3–10.4)
CHLORIDE SERPL-SCNC: 104 MMOL/L (ref 98–107)
CO2 SERPL-SCNC: 26 MMOL/L (ref 21–32)
CREAT SERPL-MCNC: 0.9 MG/DL (ref 0.8–1.5)
GLUCOSE SERPL-MCNC: 127 MG/DL (ref 65–100)
POTASSIUM SERPL-SCNC: 4.1 MMOL/L (ref 3.5–5.1)
SODIUM SERPL-SCNC: 136 MMOL/L (ref 138–145)

## 2022-03-07 PROCEDURE — C1769 GUIDE WIRE: HCPCS

## 2022-03-07 PROCEDURE — 76010000138 HC OR TIME 0.5 TO 1 HR: Performed by: SURGERY

## 2022-03-07 PROCEDURE — 74011250637 HC RX REV CODE- 250/637: Performed by: ANESTHESIOLOGY

## 2022-03-07 PROCEDURE — 76937 US GUIDE VASCULAR ACCESS: CPT

## 2022-03-07 PROCEDURE — 75625 CONTRAST EXAM ABDOMINL AORTA: CPT | Performed by: SURGERY

## 2022-03-07 PROCEDURE — C1887 CATHETER, GUIDING: HCPCS

## 2022-03-07 PROCEDURE — 74011250636 HC RX REV CODE- 250/636: Performed by: ANESTHESIOLOGY

## 2022-03-07 PROCEDURE — 2709999900 HC NON-CHARGEABLE SUPPLY: Performed by: SURGERY

## 2022-03-07 PROCEDURE — C1894 INTRO/SHEATH, NON-LASER: HCPCS

## 2022-03-07 PROCEDURE — 80048 BASIC METABOLIC PNL TOTAL CA: CPT

## 2022-03-07 PROCEDURE — 74011250636 HC RX REV CODE- 250/636: Performed by: SURGERY

## 2022-03-07 PROCEDURE — 75716 ARTERY X-RAYS ARMS/LEGS: CPT | Performed by: SURGERY

## 2022-03-07 PROCEDURE — 74011000636 HC RX REV CODE- 636: Performed by: SURGERY

## 2022-03-07 PROCEDURE — 74011000250 HC RX REV CODE- 250: Performed by: NURSE ANESTHETIST, CERTIFIED REGISTERED

## 2022-03-07 PROCEDURE — 75625 CONTRAST EXAM ABDOMINL AORTA: CPT

## 2022-03-07 PROCEDURE — 76060000033 HC ANESTHESIA 1 TO 1.5 HR: Performed by: SURGERY

## 2022-03-07 PROCEDURE — 74011250636 HC RX REV CODE- 250/636: Performed by: NURSE ANESTHETIST, CERTIFIED REGISTERED

## 2022-03-07 PROCEDURE — 76210000022 HC REC RM PH II 1.5 TO 2 HR: Performed by: SURGERY

## 2022-03-07 PROCEDURE — 36246 INS CATH ABD/L-EXT ART 2ND: CPT | Performed by: SURGERY

## 2022-03-07 PROCEDURE — C1760 CLOSURE DEV, VASC: HCPCS

## 2022-03-07 PROCEDURE — 36246 INS CATH ABD/L-EXT ART 2ND: CPT

## 2022-03-07 PROCEDURE — 74011000250 HC RX REV CODE- 250: Performed by: SURGERY

## 2022-03-07 PROCEDURE — 76210000006 HC OR PH I REC 0.5 TO 1 HR: Performed by: SURGERY

## 2022-03-07 RX ORDER — NALOXONE HYDROCHLORIDE 0.4 MG/ML
0.2 INJECTION, SOLUTION INTRAMUSCULAR; INTRAVENOUS; SUBCUTANEOUS AS NEEDED
Status: DISCONTINUED | OUTPATIENT
Start: 2022-03-07 | End: 2022-03-07 | Stop reason: HOSPADM

## 2022-03-07 RX ORDER — ALBUTEROL SULFATE 0.83 MG/ML
2.5 SOLUTION RESPIRATORY (INHALATION)
Status: DISCONTINUED | OUTPATIENT
Start: 2022-03-07 | End: 2022-03-07 | Stop reason: HOSPADM

## 2022-03-07 RX ORDER — SODIUM CHLORIDE 0.9 % (FLUSH) 0.9 %
5-40 SYRINGE (ML) INJECTION EVERY 8 HOURS
Status: DISCONTINUED | OUTPATIENT
Start: 2022-03-07 | End: 2022-03-07 | Stop reason: HOSPADM

## 2022-03-07 RX ORDER — ACETAMINOPHEN 325 MG/1
650 TABLET ORAL ONCE
Status: COMPLETED | OUTPATIENT
Start: 2022-03-07 | End: 2022-03-07

## 2022-03-07 RX ORDER — FENTANYL CITRATE 50 UG/ML
INJECTION, SOLUTION INTRAMUSCULAR; INTRAVENOUS AS NEEDED
Status: DISCONTINUED | OUTPATIENT
Start: 2022-03-07 | End: 2022-03-07 | Stop reason: HOSPADM

## 2022-03-07 RX ORDER — LIDOCAINE HYDROCHLORIDE 10 MG/ML
INJECTION INFILTRATION; PERINEURAL AS NEEDED
Status: DISCONTINUED | OUTPATIENT
Start: 2022-03-07 | End: 2022-03-07 | Stop reason: HOSPADM

## 2022-03-07 RX ORDER — LIDOCAINE HYDROCHLORIDE 10 MG/ML
0.1 INJECTION INFILTRATION; PERINEURAL AS NEEDED
Status: DISCONTINUED | OUTPATIENT
Start: 2022-03-07 | End: 2022-03-07 | Stop reason: HOSPADM

## 2022-03-07 RX ORDER — CEFAZOLIN SODIUM/WATER 2 G/20 ML
2 SYRINGE (ML) INTRAVENOUS ONCE
Status: COMPLETED | OUTPATIENT
Start: 2022-03-07 | End: 2022-03-07

## 2022-03-07 RX ORDER — ONDANSETRON 2 MG/ML
4 INJECTION INTRAMUSCULAR; INTRAVENOUS
Status: DISCONTINUED | OUTPATIENT
Start: 2022-03-07 | End: 2022-03-07 | Stop reason: HOSPADM

## 2022-03-07 RX ORDER — PROPOFOL 10 MG/ML
INJECTION, EMULSION INTRAVENOUS AS NEEDED
Status: DISCONTINUED | OUTPATIENT
Start: 2022-03-07 | End: 2022-03-07 | Stop reason: HOSPADM

## 2022-03-07 RX ORDER — HEPARIN SODIUM 200 [USP'U]/100ML
INJECTION, SOLUTION INTRAVENOUS AS NEEDED
Status: DISCONTINUED | OUTPATIENT
Start: 2022-03-07 | End: 2022-03-07 | Stop reason: HOSPADM

## 2022-03-07 RX ORDER — SODIUM CHLORIDE 0.9 % (FLUSH) 0.9 %
5-40 SYRINGE (ML) INJECTION AS NEEDED
Status: DISCONTINUED | OUTPATIENT
Start: 2022-03-07 | End: 2022-03-07 | Stop reason: HOSPADM

## 2022-03-07 RX ORDER — TRISODIUM CITRATE DIHYDRATE AND CITRIC ACID MONOHYDRATE 500; 334 MG/5ML; MG/5ML
30 SOLUTION ORAL
Status: DISCONTINUED | OUTPATIENT
Start: 2022-03-07 | End: 2022-03-07 | Stop reason: HOSPADM

## 2022-03-07 RX ORDER — OXYCODONE HYDROCHLORIDE 5 MG/1
5 TABLET ORAL
Status: DISCONTINUED | OUTPATIENT
Start: 2022-03-07 | End: 2022-03-07 | Stop reason: HOSPADM

## 2022-03-07 RX ORDER — HYDROMORPHONE HYDROCHLORIDE 2 MG/ML
0.5 INJECTION, SOLUTION INTRAMUSCULAR; INTRAVENOUS; SUBCUTANEOUS
Status: DISCONTINUED | OUTPATIENT
Start: 2022-03-07 | End: 2022-03-07 | Stop reason: HOSPADM

## 2022-03-07 RX ORDER — SODIUM CHLORIDE, SODIUM LACTATE, POTASSIUM CHLORIDE, CALCIUM CHLORIDE 600; 310; 30; 20 MG/100ML; MG/100ML; MG/100ML; MG/100ML
100 INJECTION, SOLUTION INTRAVENOUS CONTINUOUS
Status: DISCONTINUED | OUTPATIENT
Start: 2022-03-07 | End: 2022-03-07 | Stop reason: HOSPADM

## 2022-03-07 RX ORDER — FLUMAZENIL 0.1 MG/ML
0.2 INJECTION INTRAVENOUS
Status: DISCONTINUED | OUTPATIENT
Start: 2022-03-07 | End: 2022-03-07 | Stop reason: HOSPADM

## 2022-03-07 RX ORDER — MIDAZOLAM HYDROCHLORIDE 1 MG/ML
2 INJECTION, SOLUTION INTRAMUSCULAR; INTRAVENOUS
Status: DISCONTINUED | OUTPATIENT
Start: 2022-03-07 | End: 2022-03-07 | Stop reason: HOSPADM

## 2022-03-07 RX ORDER — SODIUM CHLORIDE, SODIUM LACTATE, POTASSIUM CHLORIDE, CALCIUM CHLORIDE 600; 310; 30; 20 MG/100ML; MG/100ML; MG/100ML; MG/100ML
75 INJECTION, SOLUTION INTRAVENOUS CONTINUOUS
Status: DISCONTINUED | OUTPATIENT
Start: 2022-03-07 | End: 2022-03-07 | Stop reason: HOSPADM

## 2022-03-07 RX ORDER — PROPOFOL 10 MG/ML
INJECTION, EMULSION INTRAVENOUS
Status: DISCONTINUED | OUTPATIENT
Start: 2022-03-07 | End: 2022-03-07 | Stop reason: HOSPADM

## 2022-03-07 RX ADMIN — PHENYLEPHRINE HYDROCHLORIDE 100 MCG: 10 INJECTION INTRAVENOUS at 10:46

## 2022-03-07 RX ADMIN — PHENYLEPHRINE HYDROCHLORIDE 150 MCG: 10 INJECTION INTRAVENOUS at 10:55

## 2022-03-07 RX ADMIN — PROPOFOL 10 MG: 10 INJECTION, EMULSION INTRAVENOUS at 11:00

## 2022-03-07 RX ADMIN — PROPOFOL 30 MG: 10 INJECTION, EMULSION INTRAVENOUS at 10:32

## 2022-03-07 RX ADMIN — FENTANYL CITRATE 50 MCG: 50 INJECTION INTRAMUSCULAR; INTRAVENOUS at 10:24

## 2022-03-07 RX ADMIN — Medication 2 G: at 10:45

## 2022-03-07 RX ADMIN — PHENYLEPHRINE HYDROCHLORIDE 100 MCG: 10 INJECTION INTRAVENOUS at 11:04

## 2022-03-07 RX ADMIN — PROPOFOL 100 MCG/KG/MIN: 10 INJECTION, EMULSION INTRAVENOUS at 10:32

## 2022-03-07 RX ADMIN — PHENYLEPHRINE HYDROCHLORIDE 100 MCG: 10 INJECTION INTRAVENOUS at 10:52

## 2022-03-07 RX ADMIN — ACETAMINOPHEN 650 MG: 325 TABLET ORAL at 10:13

## 2022-03-07 RX ADMIN — SODIUM CHLORIDE, SODIUM LACTATE, POTASSIUM CHLORIDE, AND CALCIUM CHLORIDE 100 ML/HR: 600; 310; 30; 20 INJECTION, SOLUTION INTRAVENOUS at 10:13

## 2022-03-07 NOTE — ANESTHESIA PREPROCEDURE EVALUATION
Relevant Problems   CARDIOVASCULAR   (+) PAD (peripheral artery disease) (HCC)       Anesthetic History               Review of Systems / Medical History  Patient summary reviewed, nursing notes reviewed and pertinent labs reviewed    Pulmonary    COPD            Comments: Duoneb - outpatient medication   Neuro/Psych             Comments: Multiple sclerosis documented in Dr. Itzel Randolph note on 11/09/21, resulting in patient's inability to ambulate, uses a wheelchair for 15 years. Chronic wounds on bilateral feet. Patient cannot extend legs out completely. Cardiovascular              PAD    Exercise tolerance: <4 METS  Comments: Aspirin    GI/Hepatic/Renal                Endo/Other             Other Findings   Comments: Minimal notes and history on patient. Chronic bilateral foot wounds, likely due to immobility and chronic dependent position of lower extremities     Multiple sclerosis documented in Dr. Itzel Randolph note on 11/09/21, resulting in patient's inability to ambulate          Physical Exam    Airway  Mallampati: II  TM Distance: 4 - 6 cm  Neck ROM: decreased range of motion        Cardiovascular    Rhythm: regular  Rate: normal      Pertinent negatives: No murmur   Dental    Dentition: Poor dentition     Pulmonary  Breath sounds clear to auscultation               Abdominal         Other Findings   Comments: Inability to extend lower legs         Anesthetic Plan    ASA: 3  Anesthesia type: total IV anesthesia          Induction: Intravenous  Anesthetic plan and risks discussed with: Patient      Patient has Multiple sclerosis.   May need patient awake to cooperate with positioning during surgery

## 2022-03-07 NOTE — PERIOP NOTES
Attempted to call family x2 without success. Per Dr. Eliseo Kam, he has spoken to family who will be back at 1:30 to  patient.

## 2022-03-07 NOTE — BRIEF OP NOTE
[address]   [address] [facility] FAX: [phone]    Brief Op Note Template Note    Pre-Op Diagnosis: PVD (peripheral vascular disease) ([facility] [address] [I73.9]    Post-Op Diagnosis:  PVD (peripheral vascular disease) ([facility] [address] [I73.9]    Procedures: Procedure(s):  RIGHT LOWER EXTREMITY ARTERIOGRAM    Surgeon: David Clemons MD    Assistants: Surgeon(s): Alfonso Man MD      Anesthesia:  General     Findings: Long SFA occlusion reconstitutes above-knee popliteal artery    Tourniquet Time:  * No tourniquets in log *    Estimated Blood Loss:               Specimens:            Implants:  * No implants in log *    Complications: None               Signed: David Clemons MD      Elements of this note have been dictated using speech recognition software. As a result, errors of speech recognition may have occurred.

## 2022-03-07 NOTE — DISCHARGE INSTRUCTIONS
Arteriogram: What to Expect at [address] [place] [address] Drive inserted a thin, flexible tube (catheter) into a blood vessel in your groin. In some cases, the catheter is placed in a blood vessel in the arm. After an arteriogram, your groin or arm may have a bruise and feel sore for a day or two. You can do light activities around the house but nothing strenuous for several days. Your doctor may give you specific instructions on when you can do your normal activities again, such as driving and going back to work. This care sheet gives you a general idea about how long it will take for you to recover. But each person recovers at a different pace. Follow the steps below to feel better as quickly as possible. How can you care for yourself at home? Activity  · Do not do strenuous exercise and do not lift, pull, or push anything heavy until your doctor says it is okay. This may be for a day or two. You can walk around the house and do light activity, such as cooking. · You may shower 24 to 48 hours after the procedure, if your doctor okays it. Pat the incision dry. Do not take a bath for 1 week, or until your doctor tells you it is okay. · If the catheter was placed in your groin, try not to walk up stairs for the first couple of days. · If your doctor recommends it, get more exercise. Walking is a good choice. Bit by bit, increase the amount you walk every day. Try for at least 30 minutes on most days of the week. Diet  · Drink plenty of fluids to help your body flush out the dye. If you have kidney, heart, or liver disease and have to limit fluids, talk with your doctor before you increase the amount of fluids you drink. · You can eat your normal diet. If your stomach is upset, try bland, low-fat foods like plain rice, broiled chicken, toast, and yogurt. Medicines  · Be safe with medicines. Read and follow all instructions on the label.   ¨ If the doctor gave you a prescription medicine for pain, take it as prescribed. ¨ If you are not taking a prescription pain medicine, ask your doctor if you can take an over-the-counter medicine. · If you take blood thinners, such as warfarin (Coumadin), clopidogrel (Plavix), or aspirin, be sure to talk to your doctor. He or she will tell you if and when to start taking those medicines again. Make sure that you understand exactly what your doctor wants you to do. · Your doctor will tell you if and when you can restart your medicines. He or she will also give you instructions about taking any new medicines. Care of the catheter site  · You will have a dressing over the cut (incision). A dressing helps the incision heal and protects it. Your doctor will tell you how to take care of this. · Put ice or a cold pack on the area for 10 to 20 minutes at a time to help with soreness or swelling. Put a thin cloth between the ice and your skin. Follow-up care is a key part of your treatment and safety. Be sure to make and go to all appointments, and call your doctor if you are having problems. It's also a good idea to know your test results and keep a list of the medicines you take. When should you call for help? Call 911 anytime you think you may need emergency care. For example, call if:  · You passed out (lost consciousness). · You have severe trouble breathing. · You have sudden chest pain and shortness of breath, or you cough up blood. Call your doctor now or seek immediate medical care if:  · You are bleeding from the area where the catheter was put in your artery. · You have a fast-growing, painful lump at the catheter site. · You have signs of infection, such as:  ¨ Increased pain, swelling, warmth, or redness. ¨ Red streaks leading from the incision. ¨ Pus draining from the incision. ¨ A fever.   After general anesthesia or intravenous sedation, for 24 hours or while taking prescription Narcotics:  · Limit your activities  · A responsible adult needs to be with you for the next 24 hours  · Do not drive and operate hazardous machinery  · Do not make important personal or business decisions  · Do not drink alcoholic beverages  · If you have not urinated within 8 hours after discharge, and you are experiencing discomfort from urinary retention, please go to the nearest ED. · If you have sleep apnea and have a CPAP machine, please use it for all naps and sleeping. · Please use caution when taking narcotics and any of your home medications that may cause drowsiness. *  Please give a list of your current medications to your Primary Care Provider. *  Please update this list whenever your medications are discontinued, doses are      changed, or new medications (including over-the-counter products) are added. *  Please carry medication information at all times in case of emergency situations. These are general instructions for a healthy lifestyle:  No smoking/ No tobacco products/ Avoid exposure to second hand smoke  Surgeon General's Warning:  Quitting smoking now greatly reduces serious risk to your health. Obesity, smoking, and sedentary lifestyle greatly increases your risk for illness  A healthy diet, regular physical exercise & weight monitoring are important for maintaining a healthy lifestyle    You may be retaining fluid if you have a history of heart failure or if you experience any of the following symptoms:  Weight gain of 3 pounds or more overnight or 5 pounds in a week, increased swelling in our hands or feet or shortness of breath while lying flat in bed. Please call your doctor as soon as you notice any of these symptoms; do not wait until your next office visit.

## 2022-03-07 NOTE — ANESTHESIA POSTPROCEDURE EVALUATION
Procedure(s):  RIGHT LOWER EXTREMITY ARTERIOGRAM.    total IV anesthesia    Anesthesia Post Evaluation      Multimodal analgesia: multimodal analgesia used between 6 hours prior to anesthesia start to PACU discharge  Patient location during evaluation: bedside  Patient participation: complete - patient participated  Level of consciousness: awake and alert and responsive to verbal stimuli  Pain score: 2  Pain management: adequate  Airway patency: patent  Anesthetic complications: no  Cardiovascular status: acceptable and hemodynamically stable  Respiratory status: acceptable  Hydration status: acceptable  Post anesthesia nausea and vomiting:  controlled  Final Post Anesthesia Temperature Assessment:  Normothermia (36.0-37.5 degrees C)      INITIAL Post-op Vital signs:   Vitals Value Taken Time   /67 03/07/22 1152   Temp 36.6 °C (97.9 °F) 03/07/22 1127   Pulse 97 03/07/22 1153   Resp 16 03/07/22 1130   SpO2 98 % 03/07/22 1153   Vitals shown include unvalidated device data.

## 2022-03-08 NOTE — OP NOTES
[address] [facility]  OPERATIVE REPORT    Name:  Richa Nunes  MR#:  [id number]  :  1955  ACCOUNT #:  [de-identified]  DATE OF SERVICE:  2022    CLINICAL SERVICE:  Vascular Surgery. PREOPERATIVE DIAGNOSIS:  Right foot posterior heel ulcer. POSTOPERATIVE DIAGNOSIS:  Right foot posterior heel ulcer. PROCEDURES PERFORMED:  1. Ultrasound-guided access of the left common femoral artery with placement of a catheter in the aorta for aortogram.  2.  Bilateral lower extremity arteriograms. SURGEON:  Tam Sol MD    ASSISTANT:      ANESTHESIA:  Sedation. COMPLICATIONS:      SPECIMENS REMOVED:      IMPLANTS:      ESTIMATED BLOOD LOSS:      OPERATIVE FINDINGS:  1. Both renal arteries were patent. 2.  There was no evidence of any aortoiliac disease. 3.  Right lower extremity arteriogram showed a patent common femoral artery and a profunda artery with a patent SFA proximally which occludes and reconstitutes above-knee popliteal artery with posterior tibial and anterior tibial being dominant runoff to the foot. 4.  Left lower extremity arteriogram showed patent common femoral artery and SFA and profunda artery with a below-knee popliteal artery being patent and posterior tibial and anterior tibial being dominant runoff to the foot. PROCEDURE:  After getting informed consent, the patient was brought to the operating room. Anesthesia was then induced. Preoperative antibiotics were given before skin incision. The patient's left groin was all prepped and draped in a normal sterile fashion. Ultrasound was done to identify the common femoral artery with the femoral head. Using a Seldinger technique, we accessed the artery and upsized to a 5-[place] sheath with 0.035 starter wire. We then put a catheter in the aorta for aortogram.  Please see above for anatomical findings. We then got the catheter up to the contralateral common femoral artery.   We did digital subtraction which showed the common femoral artery was patent, however, the proximal SFA was patent but occluded in the proximal portion and reconstitutes via collateral flow from the profunda at the above-knee pop with the tibioperoneal trunk being patent. The anterior tibial and peroneal being dominant runoff to the foot. We attempted to cannulate the SFA with an 0.018 wire, however, it was unsuccessful. We removed our sheath back and did a completion left lower extremity arteriogram which showed a patent common femoral, profunda, and SFA and below-knee popliteal artery being patent with posterior tibial and anterior tibial being dominant runoff to the foot. We removed the sheath and closed with a Mynx. The patient was taken to the PACU in stable condition.       Suzie Moraes MD      [id number]  D:  03/07/2022 12:26  T:  03/07/2022 23:18  JOB #:  [id number]

## 2022-03-18 PROBLEM — G35 MS (MULTIPLE SCLEROSIS) (HCC): Status: ACTIVE | Noted: 2022-01-04

## 2022-03-19 PROBLEM — I87.332 IDIOPATHIC CHRONIC VENOUS HYPERTENSION OF LEFT LOWER EXTREMITY WITH ULCER AND INFLAMMATION (HCC): Status: ACTIVE | Noted: 2021-04-07

## 2022-03-19 PROBLEM — L97.929 IDIOPATHIC CHRONIC VENOUS HYPERTENSION OF LEFT LOWER EXTREMITY WITH ULCER AND INFLAMMATION (HCC): Status: ACTIVE | Noted: 2021-04-07

## 2022-03-19 PROBLEM — L97.919 IDIOPATHIC CHRONIC VENOUS HYPERTENSION OF RIGHT LOWER EXTREMITY WITH ULCER AND INFLAMMATION (HCC): Status: ACTIVE | Noted: 2021-04-07

## 2022-03-19 PROBLEM — I87.331 IDIOPATHIC CHRONIC VENOUS HYPERTENSION OF RIGHT LOWER EXTREMITY WITH ULCER AND INFLAMMATION (HCC): Status: ACTIVE | Noted: 2021-04-07

## 2022-03-20 PROBLEM — I73.9 PAD (PERIPHERAL ARTERY DISEASE) (HCC): Status: ACTIVE | Noted: 2022-01-04

## 2022-04-27 ENCOUNTER — HOSPITAL ENCOUNTER (OUTPATIENT)
Dept: SURGERY | Age: 67
Discharge: HOME | End: 2022-04-27

## 2022-04-27 NOTE — PERIOP NOTES
Attempted to contact patient at  [phone]. No answer, message left on voicemail. Message left on voicemail for Karena Harris- surgery scheduler for Dr Malissa Boyce.

## 2022-04-28 ENCOUNTER — HOSPITAL ENCOUNTER (OUTPATIENT)
Dept: SURGERY | Age: 67
Discharge: HOME | End: 2022-04-28
Attending: SURGERY
Payer: MEDICARE

## 2022-04-28 VITALS
TEMPERATURE: 98.6 F | SYSTOLIC BLOOD PRESSURE: 145 MMHG | RESPIRATION RATE: 16 BRPM | BODY MASS INDEX: 22.88 KG/M2 | OXYGEN SATURATION: 97 % | HEART RATE: 68 BPM | DIASTOLIC BLOOD PRESSURE: 63 MMHG | WEIGHT: 134 LBS | HEIGHT: 64 IN

## 2022-04-28 LAB
ATRIAL RATE: 63 BPM
CALCULATED P AXIS, ECG09: 75 DEGREES
CALCULATED R AXIS, ECG10: 68 DEGREES
CALCULATED T AXIS, ECG11: 70 DEGREES
DIAGNOSIS, 93000: NORMAL
ERYTHROCYTE [DISTWIDTH] IN BLOOD BY AUTOMATED COUNT: 16 % (ref 11.9–14.6)
HCT VFR BLD AUTO: 42.9 % (ref 41.1–50.3)
HGB BLD-MCNC: 13.4 G/DL (ref 13.6–17.2)
MCH RBC QN AUTO: 27.1 PG (ref 26.1–32.9)
MCHC RBC AUTO-ENTMCNC: 31.2 G/DL (ref 31.4–35)
MCV RBC AUTO: 86.7 FL (ref 79.6–97.8)
NRBC # BLD: 0 K/UL (ref 0–0.2)
P-R INTERVAL, ECG05: 198 MS
PLATELET # BLD AUTO: 414 K/UL (ref 150–450)
PMV BLD AUTO: 10.6 FL (ref 9.4–12.3)
Q-T INTERVAL, ECG07: 426 MS
QRS DURATION, ECG06: 88 MS
QTC CALCULATION (BEZET), ECG08: 435 MS
RBC # BLD AUTO: 4.95 M/UL (ref 4.23–5.6)
VENTRICULAR RATE, ECG03: 63 BPM
WBC # BLD AUTO: 12.1 K/UL (ref 4.3–11.1)

## 2022-04-28 PROCEDURE — 85027 COMPLETE CBC AUTOMATED: CPT

## 2022-04-28 PROCEDURE — 93005 ELECTROCARDIOGRAM TRACING: CPT

## 2022-04-28 NOTE — PERIOP NOTES
Recent Results (from the past 12 hour(s))   EKG, 12 LEAD, INITIAL    Collection Time: 04/28/22 11:00 AM   Result Value Ref Range    Ventricular Rate 63 BPM    Atrial Rate 63 BPM    P-R Interval 198 ms    QRS Duration 88 ms    Q-T Interval 426 ms    QTC Calculation (Bezet) 435 ms    Calculated P Axis 75 degrees    Calculated R Axis 68 degrees    Calculated T Axis 70 degrees    Diagnosis       Normal sinus rhythm  Nonspecific ST and T wave abnormality  Abnormal ECG  When compared with ECG of 28-APR-2022 11:00,  QRS axis Shifted left  Confirmed by Joseph Warren MD (), MIKE ([id number]) on 4/28/2022 1:58:29 PM     CBC W/O DIFF    Collection Time: 04/28/22 11:08 AM   Result Value Ref Range    WBC 12.1 (H) 4.3 - 11.1 K/uL    RBC 4.95 4.23 - 5.6 M/uL    HGB 13.4 (L) 13.6 - 17.2 g/dL    HCT 42.9 41.1 - 50.3 %    MCV 86.7 79.6 - 97.8 FL    MCH 27.1 26.1 - 32.9 PG    MCHC 31.2 (L) 31.4 - 35.0 g/dL    RDW 16.0 (H) 11.9 - 14.6 %    PLATELET 577 411 - 797 K/uL    MPV 10.6 9.4 - 12.3 FL    ABSOLUTE NRBC 0.00 0.0 - 0.2 K/uL

## 2022-04-28 NOTE — PERIOP NOTES
Patient verified name and     Order for consent NOT found in EHR and UNABLE to match case posting; patient verified. Type 3 surgery, walk-in assessment complete. Labs per surgeon: NONE;   Labs per anesthesia protocol: CBC, BMP s/h (lab processing error, collect dos), T/S s/h dos; results pending  [id number]    Pt has transportation issues and is unable to come back for BMP lab draw. Notified Dr. Zeynep Leiva of surgery case, per Dr. Zeynep Leiva did not need to see pt today; anesthesia to assess pt dos. No other orders received. Hospital approved surgical skin cleanser and instructions given per hospital policy. Patient provided with and instructed on educational handouts including Guide to Surgery, Pain Management, Hand Hygiene, Blood Transfusion Education, and [place] [facility] Brochure. Patient answered medical/surgical history questions at their best of ability. All prior to admission medications documented in [facility] Care. Original medication prescription bottle NOT visualized during patient appointment. Patient instructed to hold all vitamins 7 days prior to surgery and NSAIDS 5 days prior to surgery, patient verbalized understanding. Patient teach back successful and patient demonstrates knowledge of instructions. PLEASE CONTINUE TAKING ALL PRESCRIPTION MEDICATIONS UP TO THE DAY OF SURGERY UNLESS OTHERWISE DIRECTED BELOW. DISCONTINUE all vitamins and supplements 7 days prior to surgery. DISCONTINUE Non-Steriodal Anti-Inflammatory (NSAIDS) such as Advil and Aleve 5 days prior to surgery. Home Medications to take  the day of surgery      Albuterol nebulizer     Aspirin        Home Medications   to Hold           Comments   Hibiclens shower the night before surgery and the morning of surgery. On the day before surgery please take Acetaminophen 1000mg in the morning and then again before bed. You may substitute for Tylenol 650 mg.            Please do not bring home medications with you on the day of surgery unless otherwise directed by your nurse. If you are instructed to bring home medications, please give them to your nurse as they will be administered by the nursing staff. If you have any questions, please call [facility] [phone] or [facility] [phone]. A copy of this note was provided to the patient for reference.

## 2022-05-03 RX ORDER — DIPHENHYDRAMINE HYDROCHLORIDE 50 MG/ML
12.5 INJECTION, SOLUTION INTRAMUSCULAR; INTRAVENOUS ONCE
Status: CANCELLED | OUTPATIENT
Start: 2022-05-03 | End: 2022-05-03

## 2022-05-03 RX ORDER — NALOXONE HYDROCHLORIDE 0.4 MG/ML
0.1 INJECTION, SOLUTION INTRAMUSCULAR; INTRAVENOUS; SUBCUTANEOUS AS NEEDED
Status: CANCELLED | OUTPATIENT
Start: 2022-05-03 | End: 2022-05-03

## 2022-05-03 RX ORDER — OXYCODONE HYDROCHLORIDE 5 MG/1
5 TABLET ORAL
Status: CANCELLED | OUTPATIENT
Start: 2022-05-03 | End: 2022-05-04

## 2022-05-03 RX ORDER — HYDROMORPHONE HYDROCHLORIDE 2 MG/ML
0.5 INJECTION, SOLUTION INTRAMUSCULAR; INTRAVENOUS; SUBCUTANEOUS
Status: CANCELLED | OUTPATIENT
Start: 2022-05-03

## 2022-05-03 RX ORDER — ONDANSETRON 2 MG/ML
4 INJECTION INTRAMUSCULAR; INTRAVENOUS ONCE
Status: CANCELLED | OUTPATIENT
Start: 2022-05-03 | End: 2022-05-03

## 2022-05-03 RX ORDER — OXYCODONE HYDROCHLORIDE 5 MG/1
10 TABLET ORAL
Status: CANCELLED | OUTPATIENT
Start: 2022-05-03 | End: 2022-05-04

## 2022-05-03 RX ORDER — CEFAZOLIN SODIUM/WATER 2 G/20 ML
2 SYRINGE (ML) INTRAVENOUS ONCE
Status: CANCELLED | OUTPATIENT
Start: 2022-05-04 | End: 2022-05-04

## 2022-05-03 RX ORDER — SODIUM CHLORIDE, SODIUM LACTATE, POTASSIUM CHLORIDE, CALCIUM CHLORIDE 600; 310; 30; 20 MG/100ML; MG/100ML; MG/100ML; MG/100ML
75 INJECTION, SOLUTION INTRAVENOUS CONTINUOUS
Status: CANCELLED | OUTPATIENT
Start: 2022-05-03

## 2022-05-04 ENCOUNTER — HOSPITAL ENCOUNTER (OUTPATIENT)
Age: 67
Discharge: HOME | End: 2022-05-04
Attending: SURGERY | Admitting: SURGERY
Payer: MEDICARE

## 2022-05-04 VITALS
OXYGEN SATURATION: 96 % | SYSTOLIC BLOOD PRESSURE: 177 MMHG | RESPIRATION RATE: 16 BRPM | TEMPERATURE: 98 F | DIASTOLIC BLOOD PRESSURE: 81 MMHG | HEART RATE: 77 BPM

## 2022-05-04 LAB
ABO + RH BLD: NORMAL
ANION GAP SERPL CALC-SCNC: 8 MMOL/L (ref 7–16)
BLOOD GROUP ANTIBODIES SERPL: NORMAL
BUN SERPL-MCNC: 22 MG/DL (ref 8–23)
CALCIUM SERPL-MCNC: 9.1 MG/DL (ref 8.3–10.4)
CHLORIDE SERPL-SCNC: 108 MMOL/L (ref 98–107)
CO2 SERPL-SCNC: 24 MMOL/L (ref 21–32)
CREAT SERPL-MCNC: 0.9 MG/DL (ref 0.8–1.5)
GLUCOSE SERPL-MCNC: 127 MG/DL (ref 65–100)
POTASSIUM SERPL-SCNC: 4.1 MMOL/L (ref 3.5–5.1)
SODIUM SERPL-SCNC: 140 MMOL/L (ref 138–145)
SPECIMEN EXP DATE BLD: NORMAL

## 2022-05-04 PROCEDURE — 80048 BASIC METABOLIC PNL TOTAL CA: CPT

## 2022-05-04 PROCEDURE — 74011250637 HC RX REV CODE- 250/637: Performed by: ANESTHESIOLOGY

## 2022-05-04 PROCEDURE — 74011250636 HC RX REV CODE- 250/636: Performed by: ANESTHESIOLOGY

## 2022-05-04 PROCEDURE — 86900 BLOOD TYPING SEROLOGIC ABO: CPT

## 2022-05-04 RX ORDER — ACETAMINOPHEN 500 MG
1000 TABLET ORAL
COMMUNITY

## 2022-05-04 RX ORDER — MIDAZOLAM HYDROCHLORIDE 1 MG/ML
2 INJECTION, SOLUTION INTRAMUSCULAR; INTRAVENOUS ONCE
Status: DISCONTINUED | OUTPATIENT
Start: 2022-05-04 | End: 2022-05-04 | Stop reason: HOSPADM

## 2022-05-04 RX ORDER — ACETAMINOPHEN 500 MG
1000 TABLET ORAL ONCE
Status: COMPLETED | OUTPATIENT
Start: 2022-05-04 | End: 2022-05-04

## 2022-05-04 RX ORDER — SODIUM CHLORIDE, SODIUM LACTATE, POTASSIUM CHLORIDE, CALCIUM CHLORIDE 600; 310; 30; 20 MG/100ML; MG/100ML; MG/100ML; MG/100ML
100 INJECTION, SOLUTION INTRAVENOUS CONTINUOUS
Status: DISCONTINUED | OUTPATIENT
Start: 2022-05-04 | End: 2022-05-04 | Stop reason: HOSPADM

## 2022-05-04 RX ORDER — MIDAZOLAM HYDROCHLORIDE 1 MG/ML
2 INJECTION, SOLUTION INTRAMUSCULAR; INTRAVENOUS
Status: DISCONTINUED | OUTPATIENT
Start: 2022-05-04 | End: 2022-05-04 | Stop reason: HOSPADM

## 2022-05-04 RX ORDER — FENTANYL CITRATE 50 UG/ML
100 INJECTION, SOLUTION INTRAMUSCULAR; INTRAVENOUS ONCE
Status: DISCONTINUED | OUTPATIENT
Start: 2022-05-04 | End: 2022-05-04 | Stop reason: HOSPADM

## 2022-05-04 RX ORDER — CEFAZOLIN SODIUM/WATER 2 G/20 ML
2 SYRINGE (ML) INTRAVENOUS ONCE
Status: DISCONTINUED | OUTPATIENT
Start: 2022-05-04 | End: 2022-05-04 | Stop reason: HOSPADM

## 2022-05-04 RX ORDER — LIDOCAINE HYDROCHLORIDE 10 MG/ML
0.1 INJECTION INFILTRATION; PERINEURAL AS NEEDED
Status: DISCONTINUED | OUTPATIENT
Start: 2022-05-04 | End: 2022-05-04 | Stop reason: HOSPADM

## 2022-05-04 RX ADMIN — ACETAMINOPHEN 1000 MG: 500 TABLET ORAL at 06:02

## 2022-05-04 RX ADMIN — SODIUM CHLORIDE, POTASSIUM CHLORIDE, SODIUM LACTATE AND CALCIUM CHLORIDE 100 ML/HR: 600; 310; 30; 20 INJECTION, SOLUTION INTRAVENOUS at 06:02

## 2022-05-04 NOTE — PROGRESS NOTES
Patient was initially scheduled for right common femoral to below-knee bypass for nonhealing ulcer. Clinically patient ulcer is improving no signs of infection. Patient is a nonambulator gets around mostly in a wheelchair. Long discussion with patient and wife in detail.   Because patient currently does not have rest pain or tissue loss we will cancel his bypass we will have him follow-up in my office in 1 month    Rosangela Monroy

## 2022-12-13 ENCOUNTER — HOSPITAL ENCOUNTER (INPATIENT)
Age: 67
LOS: 1 days | Discharge: HOME | End: 2022-12-14
Attending: EMERGENCY MEDICINE | Admitting: FAMILY MEDICINE
Payer: MEDICARE

## 2022-12-13 ENCOUNTER — HOSPITAL ENCOUNTER (EMERGENCY)
Dept: GENERAL RADIOLOGY | Age: 67
Discharge: HOME | End: 2022-12-16
Payer: MEDICARE

## 2022-12-13 DIAGNOSIS — L03.115 CELLULITIS OF RIGHT FOOT: ICD-10-CM

## 2022-12-13 DIAGNOSIS — L03.116 CELLULITIS OF LEFT FOOT: Primary | ICD-10-CM

## 2022-12-13 DIAGNOSIS — G35 MULTIPLE SCLEROSIS (HCC): ICD-10-CM

## 2022-12-13 DIAGNOSIS — I73.9 PERIPHERAL VASCULAR DISEASE (HCC): ICD-10-CM

## 2022-12-13 PROBLEM — L97.929 IDIOPATHIC CHRONIC VENOUS HYPERTENSION OF LEFT LOWER EXTREMITY WITH ULCER AND INFLAMMATION (HCC): Status: ACTIVE | Noted: 2021-04-07

## 2022-12-13 PROBLEM — L97.519 SKIN ULCERS OF FOOT, BILATERAL (HCC): Status: ACTIVE | Noted: 2022-12-13

## 2022-12-13 PROBLEM — L97.919 IDIOPATHIC CHRONIC VENOUS HYPERTENSION OF RIGHT LOWER EXTREMITY WITH ULCER AND INFLAMMATION (HCC): Status: ACTIVE | Noted: 2021-04-07

## 2022-12-13 PROBLEM — I87.332 IDIOPATHIC CHRONIC VENOUS HYPERTENSION OF LEFT LOWER EXTREMITY WITH ULCER AND INFLAMMATION (HCC): Status: ACTIVE | Noted: 2021-04-07

## 2022-12-13 PROBLEM — J44.9 COPD (CHRONIC OBSTRUCTIVE PULMONARY DISEASE) (HCC): Status: ACTIVE | Noted: 2022-12-13

## 2022-12-13 PROBLEM — L97.529 SKIN ULCERS OF FOOT, BILATERAL (HCC): Status: ACTIVE | Noted: 2022-12-13

## 2022-12-13 PROBLEM — I87.331 IDIOPATHIC CHRONIC VENOUS HYPERTENSION OF RIGHT LOWER EXTREMITY WITH ULCER AND INFLAMMATION (HCC): Status: ACTIVE | Noted: 2021-04-07

## 2022-12-13 LAB
ALBUMIN SERPL-MCNC: 3.3 G/DL (ref 3.2–4.6)
ALBUMIN/GLOB SERPL: 0.8 {RATIO} (ref 0.4–1.6)
ALP SERPL-CCNC: 119 U/L (ref 50–136)
ALT SERPL-CCNC: 20 U/L (ref 12–65)
ANION GAP SERPL CALC-SCNC: 5 MMOL/L (ref 2–11)
AST SERPL-CCNC: 17 U/L (ref 15–37)
BASOPHILS # BLD: 0 K/UL (ref 0–0.2)
BASOPHILS NFR BLD: 0 % (ref 0–2)
BILIRUB SERPL-MCNC: 0.5 MG/DL (ref 0.2–1.1)
BUN SERPL-MCNC: 18 MG/DL (ref 8–23)
CALCIUM SERPL-MCNC: 9.5 MG/DL (ref 8.3–10.4)
CHLORIDE SERPL-SCNC: 111 MMOL/L (ref 101–110)
CO2 SERPL-SCNC: 26 MMOL/L (ref 21–32)
CREAT SERPL-MCNC: 1 MG/DL (ref 0.8–1.5)
DIFFERENTIAL METHOD BLD: ABNORMAL
EOSINOPHIL # BLD: 0.2 K/UL (ref 0–0.8)
EOSINOPHIL NFR BLD: 2 % (ref 0.5–7.8)
ERYTHROCYTE [DISTWIDTH] IN BLOOD BY AUTOMATED COUNT: 16.6 % (ref 11.9–14.6)
GLOBULIN SER CALC-MCNC: 4 G/DL (ref 2.8–4.5)
GLUCOSE SERPL-MCNC: 124 MG/DL (ref 65–100)
HCT VFR BLD AUTO: 45 % (ref 41.1–50.3)
HGB BLD-MCNC: 14.1 G/DL (ref 13.6–17.2)
IMM GRANULOCYTES # BLD AUTO: 0 K/UL (ref 0–0.5)
IMM GRANULOCYTES NFR BLD AUTO: 0 % (ref 0–5)
LACTATE SERPL-SCNC: 1.8 MMOL/L (ref 0.4–2)
LYMPHOCYTES # BLD: 2.7 K/UL (ref 0.5–4.6)
LYMPHOCYTES NFR BLD: 24 % (ref 13–44)
MCH RBC QN AUTO: 28.1 PG (ref 26.1–32.9)
MCHC RBC AUTO-ENTMCNC: 31.3 G/DL (ref 31.4–35)
MCV RBC AUTO: 89.6 FL (ref 82–102)
MONOCYTES # BLD: 0.7 K/UL (ref 0.1–1.3)
MONOCYTES NFR BLD: 6 % (ref 4–12)
NEUTS SEG # BLD: 7.3 K/UL (ref 1.7–8.2)
NEUTS SEG NFR BLD: 68 % (ref 43–78)
NRBC # BLD: 0 K/UL (ref 0–0.2)
PLATELET # BLD AUTO: 447 K/UL (ref 150–450)
PMV BLD AUTO: 10.1 FL (ref 9.4–12.3)
POTASSIUM SERPL-SCNC: 4.1 MMOL/L (ref 3.5–5.1)
PROCALCITONIN SERPL-MCNC: 0.09 NG/ML (ref 0–0.49)
PROT SERPL-MCNC: 7.3 G/DL (ref 6.3–8.2)
RBC # BLD AUTO: 5.02 M/UL (ref 4.23–5.6)
SODIUM SERPL-SCNC: 142 MMOL/L (ref 133–143)
WBC # BLD AUTO: 10.9 K/UL (ref 4.3–11.1)

## 2022-12-13 PROCEDURE — 6360000002 HC RX W HCPCS: Performed by: EMERGENCY MEDICINE

## 2022-12-13 PROCEDURE — 84145 PROCALCITONIN (PCT): CPT

## 2022-12-13 PROCEDURE — 2580000003 HC RX 258: Performed by: EMERGENCY MEDICINE

## 2022-12-13 PROCEDURE — 73630 X-RAY EXAM OF FOOT: CPT

## 2022-12-13 PROCEDURE — 85025 COMPLETE CBC W/AUTO DIFF WBC: CPT

## 2022-12-13 PROCEDURE — 6370000000 HC RX 637 (ALT 250 FOR IP): Performed by: FAMILY MEDICINE

## 2022-12-13 PROCEDURE — 96375 TX/PRO/DX INJ NEW DRUG ADDON: CPT

## 2022-12-13 PROCEDURE — 83605 ASSAY OF LACTIC ACID: CPT

## 2022-12-13 PROCEDURE — 80053 COMPREHEN METABOLIC PANEL: CPT

## 2022-12-13 PROCEDURE — 6370000000 HC RX 637 (ALT 250 FOR IP): Performed by: EMERGENCY MEDICINE

## 2022-12-13 PROCEDURE — 1100000000 HC RM PRIVATE

## 2022-12-13 PROCEDURE — 96365 THER/PROPH/DIAG IV INF INIT: CPT

## 2022-12-13 PROCEDURE — 99285 EMERGENCY DEPT VISIT HI MDM: CPT

## 2022-12-13 PROCEDURE — 6360000002 HC RX W HCPCS: Performed by: FAMILY MEDICINE

## 2022-12-13 PROCEDURE — 2580000003 HC RX 258: Performed by: FAMILY MEDICINE

## 2022-12-13 RX ORDER — HYDROCODONE BITARTRATE AND ACETAMINOPHEN 5; 325 MG/1; MG/1
1 TABLET ORAL
Status: COMPLETED | OUTPATIENT
Start: 2022-12-13 | End: 2022-12-13

## 2022-12-13 RX ORDER — OXYCODONE HYDROCHLORIDE 5 MG/1
5 TABLET ORAL EVERY 4 HOURS PRN
Status: DISCONTINUED | OUTPATIENT
Start: 2022-12-13 | End: 2022-12-14 | Stop reason: HOSPADM

## 2022-12-13 RX ORDER — DICYCLOMINE HYDROCHLORIDE 10 MG/1
10 CAPSULE ORAL 3 TIMES DAILY PRN
Status: DISCONTINUED | OUTPATIENT
Start: 2022-12-13 | End: 2022-12-14 | Stop reason: HOSPADM

## 2022-12-13 RX ORDER — MORPHINE SULFATE 4 MG/ML
4 INJECTION, SOLUTION INTRAMUSCULAR; INTRAVENOUS
Status: DISCONTINUED | OUTPATIENT
Start: 2022-12-13 | End: 2022-12-13

## 2022-12-13 RX ORDER — FAMOTIDINE 20 MG/1
20 TABLET, FILM COATED ORAL 2 TIMES DAILY
Status: DISCONTINUED | OUTPATIENT
Start: 2022-12-13 | End: 2022-12-14 | Stop reason: HOSPADM

## 2022-12-13 RX ORDER — IPRATROPIUM BROMIDE AND ALBUTEROL SULFATE 2.5; .5 MG/3ML; MG/3ML
1 SOLUTION RESPIRATORY (INHALATION) EVERY 4 HOURS PRN
Status: DISCONTINUED | OUTPATIENT
Start: 2022-12-13 | End: 2022-12-14 | Stop reason: HOSPADM

## 2022-12-13 RX ORDER — ACETAMINOPHEN 325 MG/1
650 TABLET ORAL EVERY 6 HOURS PRN
Status: DISCONTINUED | OUTPATIENT
Start: 2022-12-13 | End: 2022-12-14 | Stop reason: HOSPADM

## 2022-12-13 RX ORDER — ASPIRIN 81 MG/1
81 TABLET, CHEWABLE ORAL DAILY
Status: DISCONTINUED | OUTPATIENT
Start: 2022-12-14 | End: 2022-12-14 | Stop reason: HOSPADM

## 2022-12-13 RX ORDER — ACETAMINOPHEN 650 MG/1
650 SUPPOSITORY RECTAL EVERY 6 HOURS PRN
Status: DISCONTINUED | OUTPATIENT
Start: 2022-12-13 | End: 2022-12-14 | Stop reason: HOSPADM

## 2022-12-13 RX ORDER — SODIUM CHLORIDE 0.9 % (FLUSH) 0.9 %
5-40 SYRINGE (ML) INJECTION EVERY 12 HOURS SCHEDULED
Status: DISCONTINUED | OUTPATIENT
Start: 2022-12-13 | End: 2022-12-14 | Stop reason: HOSPADM

## 2022-12-13 RX ORDER — SODIUM CHLORIDE 0.9 % (FLUSH) 0.9 %
5-40 SYRINGE (ML) INJECTION PRN
Status: DISCONTINUED | OUTPATIENT
Start: 2022-12-13 | End: 2022-12-14 | Stop reason: HOSPADM

## 2022-12-13 RX ORDER — ONDANSETRON 2 MG/ML
4 INJECTION INTRAMUSCULAR; INTRAVENOUS
Status: COMPLETED | OUTPATIENT
Start: 2022-12-13 | End: 2022-12-13

## 2022-12-13 RX ORDER — SODIUM CHLORIDE 9 MG/ML
INJECTION, SOLUTION INTRAVENOUS PRN
Status: DISCONTINUED | OUTPATIENT
Start: 2022-12-13 | End: 2022-12-14 | Stop reason: HOSPADM

## 2022-12-13 RX ORDER — POLYETHYLENE GLYCOL 3350 17 G/17G
17 POWDER, FOR SOLUTION ORAL DAILY PRN
Status: DISCONTINUED | OUTPATIENT
Start: 2022-12-13 | End: 2022-12-14 | Stop reason: HOSPADM

## 2022-12-13 RX ADMIN — OXYCODONE 5 MG: 5 TABLET ORAL at 21:55

## 2022-12-13 RX ADMIN — VANCOMYCIN HYDROCHLORIDE 1000 MG: 1 INJECTION, POWDER, LYOPHILIZED, FOR SOLUTION INTRAVENOUS at 20:27

## 2022-12-13 RX ADMIN — PIPERACILLIN AND TAZOBACTAM 4500 MG: 4; .5 INJECTION, POWDER, FOR SOLUTION INTRAVENOUS at 15:59

## 2022-12-13 RX ADMIN — ONDANSETRON 4 MG: 2 INJECTION INTRAMUSCULAR; INTRAVENOUS at 15:59

## 2022-12-13 RX ADMIN — FAMOTIDINE 20 MG: 20 TABLET, FILM COATED ORAL at 20:26

## 2022-12-13 RX ADMIN — SODIUM CHLORIDE, PRESERVATIVE FREE 10 ML: 5 INJECTION INTRAVENOUS at 21:41

## 2022-12-13 RX ADMIN — HYDROCODONE BITARTRATE AND ACETAMINOPHEN 1 TABLET: 5; 325 TABLET ORAL at 17:08

## 2022-12-13 ASSESSMENT — PAIN SCALES - GENERAL
PAINLEVEL_OUTOF10: 7
PAINLEVEL_OUTOF10: 10
PAINLEVEL_OUTOF10: 6
PAINLEVEL_OUTOF10: 6
PAINLEVEL_OUTOF10: 9

## 2022-12-13 ASSESSMENT — PAIN DESCRIPTION - LOCATION
LOCATION: FOOT

## 2022-12-13 ASSESSMENT — ENCOUNTER SYMPTOMS
COUGH: 0
ABDOMINAL PAIN: 0
SHORTNESS OF BREATH: 0

## 2022-12-13 ASSESSMENT — PAIN - FUNCTIONAL ASSESSMENT: PAIN_FUNCTIONAL_ASSESSMENT: 0-10

## 2022-12-13 ASSESSMENT — PAIN DESCRIPTION - ORIENTATION: ORIENTATION: RIGHT

## 2022-12-13 ASSESSMENT — PAIN DESCRIPTION - DESCRIPTORS: DESCRIPTORS: ACHING;THROBBING

## 2022-12-13 NOTE — ED PROVIDER NOTES
Emergency Department Provider Note                   PCP:                Evaristo Siemens, MD               Age: 78 y.o. Sex: male     No diagnosis found. DISPOSITION          MDM  Number of Diagnoses or Management Options  Cellulitis of left foot  Cellulitis of right foot  Multiple sclerosis (HCC)  Peripheral vascular disease ([facility] [address]  Diagnosis management comments: Blood work is unremarkable. Patient treated with Zosyn. X-ray shows no air or osteo. Wounds are concerning and appear to be infected. Will discuss with hospitalist for admission and he will possibly need vascular consult.        Amount and/or Complexity of Data Reviewed  Clinical lab tests: ordered and reviewed  Tests in the radiology section of CPT®: ordered and reviewed  Tests in the medicine section of CPT®: ordered and reviewed  Discuss the patient with other providers: yes  Independent visualization of images, tracings, or specimens: yes    Risk of Complications, Morbidity, and/or Mortality  Presenting problems: moderate  Diagnostic procedures: moderate  Management options: moderate               Orders Placed This Encounter   Procedures    XR FOOT RIGHT (MIN 3 VIEWS)    XR FOOT LEFT (MIN 3 VIEWS)    CBC with Auto Differential    Comprehensive Metabolic Panel    Lactic Acid    Procalcitonin        Medications   morphine sulfate (PF) injection 4 mg (has no administration in time range)   piperacillin-tazobactam (ZOSYN) 4,500 mg in sodium chloride 0.9 % 100 mL IVPB (mini-bag) (4,500 mg IntraVENous New Bag 12/13/22 1559)   ondansetron (ZOFRAN) injection 4 mg (4 mg IntraVENous Given 12/13/22 1559)       New Prescriptions    No medications on file        Yonatan Becker is a 78 y.o. male who presents to the Emergency Department with chief complaint of    Chief Complaint   Patient presents with    Wound Infection      72-year-old white male with peripheral vascular disease and chronic wounds to both feet presents with worsening pain, swelling and skin breakdown over the past couple weeks. No chest pain or shortness of breath. No nausea, vomiting or fever. Patient states that he was due for bypass on his right leg but this was canceled as his symptoms seem to be improving. The history is provided by the patient. Review of Systems   Constitutional:  Negative for fever. HENT:  Negative for congestion. Respiratory:  Negative for cough and shortness of breath. Cardiovascular:  Negative for chest pain. Gastrointestinal:  Negative for abdominal pain. Neurological:  Negative for headaches. All other systems reviewed and are negative. Past Medical History:   Diagnosis Date    Cellulitis     Chronic obstructive pulmonary disease (HCC)     Generalized weakness     Methamphetamine abuse ([address]     Multiple sclerosis ([address]     wheelchair bound; can't extend lower extremties    PVD (peripheral vascular disease) ([address]     has stent in groin    Sacral ulcer ([address]     Skin ulcers of foot, bilateral ([address]     chronic        Past Surgical History:   Procedure Laterality Date    VASCULAR SURGERY  2022    Ultrasound-guided access of the left common femoral artery with placement of a catheter in the aorta for aortogram.Bilateral lower extremity arteriograms. Family History   Problem Relation Age of Onset    Stroke Mother         Social History     Socioeconomic History    Marital status: Single   Tobacco Use    Smoking status: Former     Types: Cigarettes     Quit date: 2020     Years since quittin.0    Smokeless tobacco: Never    Tobacco comments:     Quit smoking: continues non-smoking status   Substance and Sexual Activity    Alcohol use: Never    Drug use: Not Currently         Patient has no known allergies. Previous Medications    ASPIRIN 325 MG TABLET    Take 325 mg by mouth daily        Vitals signs and nursing note reviewed.    Patient Vitals for the past 4 hrs:   Temp Pulse Resp BP SpO2   22 1430 98.3 °F (36.8 °C) 78 20 (!) 133/100 100 %          Physical Exam  Vitals and nursing note reviewed. Constitutional:       General: He is not in acute distress. Appearance: Normal appearance. He is not toxic-appearing. HENT:      Head: Normocephalic and atraumatic. Cardiovascular:      Rate and Rhythm: Normal rate. Pulmonary:      Effort: Pulmonary effort is normal.   Musculoskeletal:      Cervical back: Normal range of motion. Comments: Significant skin breakdown dorsal aspect of both feet extending to the lateral aspect of both heels. So skin loss of the right second toe. No palpable pulses however pulses are easily dopplerable. Skin:     General: Skin is warm and dry. Neurological:      Mental Status: He is alert and oriented to person, place, and time.    Psychiatric:         Mood and Affect: Mood normal.         Behavior: Behavior normal.        Procedures    Results for orders placed or performed during the hospital encounter of 12/13/22   CBC with Auto Differential   Result Value Ref Range    WBC 10.9 4.3 - 11.1 K/uL    RBC 5.02 4.23 - 5.6 M/uL    Hemoglobin 14.1 13.6 - 17.2 g/dL    Hematocrit 45.0 41.1 - 50.3 %    MCV 89.6 82 - 102 FL    MCH 28.1 26.1 - 32.9 PG    MCHC 31.3 (L) 31.4 - 35.0 g/dL    RDW 16.6 (H) 11.9 - 14.6 %    Platelets 882 023 - 249 K/uL    MPV 10.1 9.4 - 12.3 FL    nRBC 0.00 0.0 - 0.2 K/uL    Differential Type AUTOMATED      Seg Neutrophils 68 43 - 78 %    Lymphocytes 24 13 - 44 %    Monocytes 6 4.0 - 12.0 %    Eosinophils % 2 0.5 - 7.8 %    Basophils 0 0.0 - 2.0 %    Immature Granulocytes 0 0.0 - 5.0 %    Segs Absolute 7.3 1.7 - 8.2 K/UL    Absolute Lymph # 2.7 0.5 - 4.6 K/UL    Absolute Mono # 0.7 0.1 - 1.3 K/UL    Absolute Eos # 0.2 0.0 - 0.8 K/UL    Basophils Absolute 0.0 0.0 - 0.2 K/UL    Absolute Immature Granulocyte 0.0 0.0 - 0.5 K/UL   Comprehensive Metabolic Panel   Result Value Ref Range    Sodium 142 133 - 143 mmol/L    Potassium 4.1 3.5 - 5.1 mmol/L    Chloride 111 (H) 101 - 110 mmol/L    CO2 26 21 - 32 mmol/L    Anion Gap 5 2 - 11 mmol/L    Glucose 124 (H) 65 - 100 mg/dL    BUN 18 8 - 23 MG/DL    Creatinine 1.00 0.8 - 1.5 MG/DL    Est, Glom Filt Rate >60 >60 ml/min/1.73m2    Calcium 9.5 8.3 - 10.4 MG/DL    Total Bilirubin 0.5 0.2 - 1.1 MG/DL    ALT 20 12 - 65 U/L    AST 17 15 - 37 U/L    Alk Phosphatase 119 50 - 136 U/L    Total Protein 7.3 6.3 - 8.2 g/dL    Albumin 3.3 3.2 - 4.6 g/dL    Globulin 4.0 2.8 - 4.5 g/dL    Albumin/Globulin Ratio 0.8 0.4 - 1.6     Lactic Acid   Result Value Ref Range    Lactic Acid, Plasma 1.8 0.4 - 2.0 MMOL/L   Procalcitonin   Result Value Ref Range    Procalcitonin 0.09 0.00 - 0.49 ng/mL        XR FOOT RIGHT (MIN 3 VIEWS)    (Results Pending)   XR FOOT LEFT (MIN 3 VIEWS)    (Results Pending)                       Voice dictation software was used during the making of this note. This software is not perfect and grammatical and other typographical errors may be present. This note has not been completely proofread for errors.      Toñito Iverson MD  12/13/22 3717

## 2022-12-13 NOTE — ACP (ADVANCE CARE PLANNING)
[facility]  At the heart of better care     Advance Care Planning   Admit Date:  2022  3:51 PM   Name:  Ahsan Ramon   Age:  78 y.o. Sex:  male  :  1955   MRN:  [id number]   Room:  [place] [address]    Ahsan Ramon is able to make his own decisions:   Yes    Patient / surrogate decision-maker directed code status:  FULL    Patient or surrogate consented to discussion of the current conditions, workup, management plans, prognosis, and the risk for further deterioration. Time spent: 17 minutes in direct discussion.       Signed:  Kai Garcia DO

## 2022-12-13 NOTE — PROGRESS NOTES
VANCO DAILY FOLLOW UP NOTE  [facility] Pharmacokinetic Monitoring Service - Vancomycin    Consulting Provider: Dr. Maria T Keita   Indication: SSTI  Target Concentration: Goal trough of 10-15 mg/L and AUC/WHITNEY <500 mg*hr/L  Day of Therapy: 1 of 7  Additional Antimicrobials: cefepime    Patient eligible for piperacillin-tazobactam to cefepime auto-substitution per P&T approved protocol? YES    Pertinent Laboratory Values: Wt Readings from Last 1 Encounters:   12/13/22 130 lb (59 kg)     Temp Readings from Last 1 Encounters:   12/13/22 98.3 °F (36.8 °C) (Oral)     Recent Labs     12/13/22  1457   BUN 18   CREATININE 1.00   WBC 10.9   PROCAL 0.09   LACACIDPL 1.8     Estimated Creatinine Clearance: 60 mL/min (based on SCr of 1 mg/dL). No results found for: Michelle Arce    MRSA Nasal Swab: N/A.  Non-respiratory infection    Assessment:  Date/Time Dose Concentration AUC         Note: Serum concentrations collected for AUC dosing may appear elevated if collected in close proximity to the dose administered, this is not necessarily an indication of toxicity    Plan:  Dosing recommendations based on Bayesian software  Start vancomycin 1000 mg q18h  Anticipated AUC of 473 and trough concentration of 14.1 at steady state  Renal labs as indicated   Vancomycin concentrations will be ordered as clinically appropriate   Pharmacy will continue to monitor patient and adjust therapy as indicated    Thank you for the consult,  Margoth Rosales [facility]

## 2022-12-13 NOTE — ED TRIAGE NOTES
Pt arrives from home via Ems stating open sores on feet that have been weeping. Pt states hx of MS. Pt states he gets around by [facility]. Pt states wheels from [facility] is hitting the sores on his foot. Pt states SOB. Pt states hx of COPD. 100RA for EMS.  Pt denies chest pain     VS with EMS  /90  HR 82  100RA    98.5 oral

## 2022-12-13 NOTE — H&P
Hospitalist Admission History and Physical         NAME:            Nancy Valladares    Age:                78 y.o.    :               1955    MRN:              [id number]    PCP: Roxanna Strong MD    Consulting MD:    Treatment Team: Attending Provider: Shannon Mckeon DO; Registered Nurse: Veronica Metcalf RN         Chief Complaint   Patient presents with    Wound Infection   HPI:    Patient is a 78 y.o. male who presented to the ED for cc open sores to feet. Admits to trauma from wheelchair to his feet. Nothing seems to make better. Hx of COPD, former smoker, MS, methamphetamine abuse now sober, PVD following Dr. Uzair Sawant. Dr Uzair Sawant was planning for right common femoral to below knee popliteal artery bypass with ipsilateral vein earlier this year but was cancelled since wounds were starting to heal.     Left and right foot x rays with no obvious osteomyelitis   Past Medical History:   Diagnosis Date    Cellulitis     Chronic obstructive pulmonary disease (HCC)     Generalized weakness     Methamphetamine abuse ([address]     Multiple sclerosis ([address]     wheelchair bound; can't extend lower extremties    PVD (peripheral vascular disease) ([address]     has stent in groin    Sacral ulcer ([address]     Skin ulcers of foot, bilateral ([address]     chronic            Past Surgical History:   Procedure Laterality Date    VASCULAR SURGERY  2022    Ultrasound-guided access of the left common femoral artery with placement of a catheter in the aorta for aortogram.Bilateral lower extremity arteriograms. Family History   Problem Relation Age of Onset    Stroke Mother        Family history reviewed and negative except as noted above.          Social History     Social History Narrative    Not on file            Social History     Tobacco Use    Smoking status: Former     Types: Cigarettes     Quit date: 2020     Years since quittin.0    Smokeless tobacco: Never    Tobacco comments:     Quit smoking: continues non-smoking status   Substance Use Topics    Alcohol use: Never            Social History     Substance and Sexual Activity   Drug Use Not Currently                 No Known Allergies         Prior to Admission medications    Medication Sig Start Date End Date Taking? Authorizing Provider   aspirin 325 MG tablet Take 325 mg by mouth daily    Ar Automatic Reconciliation                      Review of Systems         Constitutional: NAD    Eyes:  no change in visual acuity, no photophobia    Ears, nose, mouth, throat, and face: no  Odynphagia, dysphagia, no thrush or exudate, negative for chronic sinus congestion, recurrent headaches    Respiratory: negative for SOB, hemoptysis or cough    Cardiovascular: negative for CP, palpitations, or PND    Gastrointestinal: negative for abdominal pain, no hematemesis, hematochezia or BRBPR    Genitourinary: no urgency, frequency, or dysuria, no nocturia    Integument/breast: Worsening skin wounds to feet that are painful    Hematologic/lymphatic: negative for known bleeding disorder    Musculoskeletal:painful feet    Neurological: negative for lightheadedness, syncope or presyncopal events, no seizure or CVA history    Behavioral/Psych: negative for depression or chronic anxiety,    Endocrine: negative for polydyspia, polyuria or intolerance to heat or cold    Allergic/Immunologic: negative for chronic allergic rhinitis, or known connective tissue disorder              Objective:         Patient Vitals for the past 24 hrs:   Temp Pulse Resp BP SpO2   12/13/22 1430 98.3 °F (36.8 °C) 78 20 (!) 133/100 100 %            No intake/output data recorded. No intake/output data recorded.          Data Review:   Recent Results (from the past 24 hour(s))   CBC with Auto Differential    Collection Time: 12/13/22  2:57 PM   Result Value Ref Range    WBC 10.9 4.3 - 11.1 K/uL    RBC 5.02 4.23 - 5.6 M/uL    Hemoglobin 14.1 13.6 - 17.2 g/dL    Hematocrit 45.0 41.1 - 50.3 %    MCV 89.6 82 - 102 FL    MCH 28.1 26.1 - 32.9 PG    MCHC 31.3 (L) 31.4 - 35.0 g/dL    RDW 16.6 (H) 11.9 - 14.6 %    Platelets 112 103 - 927 K/uL    MPV 10.1 9.4 - 12.3 FL    nRBC 0.00 0.0 - 0.2 K/uL    Differential Type AUTOMATED      Seg Neutrophils 68 43 - 78 %    Lymphocytes 24 13 - 44 %    Monocytes 6 4.0 - 12.0 %    Eosinophils % 2 0.5 - 7.8 %    Basophils 0 0.0 - 2.0 %    Immature Granulocytes 0 0.0 - 5.0 %    Segs Absolute 7.3 1.7 - 8.2 K/UL    Absolute Lymph # 2.7 0.5 - 4.6 K/UL    Absolute Mono # 0.7 0.1 - 1.3 K/UL    Absolute Eos # 0.2 0.0 - 0.8 K/UL    Basophils Absolute 0.0 0.0 - 0.2 K/UL    Absolute Immature Granulocyte 0.0 0.0 - 0.5 K/UL   Comprehensive Metabolic Panel    Collection Time: 12/13/22  2:57 PM   Result Value Ref Range    Sodium 142 133 - 143 mmol/L    Potassium 4.1 3.5 - 5.1 mmol/L    Chloride 111 (H) 101 - 110 mmol/L    CO2 26 21 - 32 mmol/L    Anion Gap 5 2 - 11 mmol/L    Glucose 124 (H) 65 - 100 mg/dL    BUN 18 8 - 23 MG/DL    Creatinine 1.00 0.8 - 1.5 MG/DL    Est, Glom Filt Rate >60 >60 ml/min/1.73m2    Calcium 9.5 8.3 - 10.4 MG/DL    Total Bilirubin 0.5 0.2 - 1.1 MG/DL    ALT 20 12 - 65 U/L    AST 17 15 - 37 U/L    Alk Phosphatase 119 50 - 136 U/L    Total Protein 7.3 6.3 - 8.2 g/dL    Albumin 3.3 3.2 - 4.6 g/dL    Globulin 4.0 2.8 - 4.5 g/dL    Albumin/Globulin Ratio 0.8 0.4 - 1.6     Lactic Acid    Collection Time: 12/13/22  2:57 PM   Result Value Ref Range    Lactic Acid, Plasma 1.8 0.4 - 2.0 MMOL/L   Procalcitonin    Collection Time: 12/13/22  2:57 PM   Result Value Ref Range    Procalcitonin 0.09 0.00 - 0.49 ng/mL            Physical Exam:         General:    Alert, cooperative, in pain    Eyes:    Conjunctivae/corneas clear. PERRL    Ears:    Normal     Nose:    Nares normal.     Mouth/Throat:    Lips, mucosa, and tongue normal. Teeth and gums normal.    Neck:    no JVD. Back:     deferred    Lungs:     Clear to auscultation bilaterally.     Heart:    Regular rate and rhythm, S1, S2 normal    Abdomen:     Soft, non-tender. Bowel sounds normal.     Extremities:    Poor pulses to LE bilaterally. Cannot palpate DP or PT. Feet are warm to touch    Skin:    Please see photo below. Diffuse skin sloughing with weeping     Neurologic:    CNII-XII intact. Limited ROM in bed           Assessment and Plan         Principal Problem:    Skin ulcers of foot, bilateral (HCC)  Active Problems:    COPD (chronic obstructive pulmonary disease) ([facility])    MS (multiple sclerosis) (HCC)    Idiopathic chronic venous hypertension of left lower extremity with ulcer and inflammation (HCC)    Idiopathic chronic venous hypertension of right lower extremity with ulcer and inflammation ([facility])    PAD (peripheral artery disease) ([facility] [address]  Resolved Problems:    * No resolved hospital problems. *    Worsening wounds to feet with known PVD - Consult vascular surgery to see in AM. ASA. Consult wound care. PRN  pain meds. Vanc/Zosyn. Order wound cultures. MS - Follows [facility] neurology. Currently not on any medications for MS.      COPD - PRN duoneb    PPI    PPD/PT/OT    DVT prophylaxis - SCDs  Signed By:    Maryla Collet, DO    December 13, 2022

## 2022-12-14 VITALS
BODY MASS INDEX: 22.67 KG/M2 | WEIGHT: 130 LBS | RESPIRATION RATE: 20 BRPM | DIASTOLIC BLOOD PRESSURE: 97 MMHG | OXYGEN SATURATION: 97 % | HEART RATE: 81 BPM | TEMPERATURE: 98.6 F | SYSTOLIC BLOOD PRESSURE: 112 MMHG

## 2022-12-14 LAB
ANION GAP SERPL CALC-SCNC: 8 MMOL/L (ref 2–11)
BASOPHILS # BLD: 0 K/UL (ref 0–0.2)
BASOPHILS NFR BLD: 0 % (ref 0–2)
BUN SERPL-MCNC: 20 MG/DL (ref 8–23)
CALCIUM SERPL-MCNC: 9.1 MG/DL (ref 8.3–10.4)
CHLORIDE SERPL-SCNC: 113 MMOL/L (ref 101–110)
CO2 SERPL-SCNC: 19 MMOL/L (ref 21–32)
CREAT SERPL-MCNC: 1.1 MG/DL (ref 0.8–1.5)
DIFFERENTIAL METHOD BLD: ABNORMAL
EOSINOPHIL # BLD: 0.3 K/UL (ref 0–0.8)
EOSINOPHIL NFR BLD: 3 % (ref 0.5–7.8)
ERYTHROCYTE [DISTWIDTH] IN BLOOD BY AUTOMATED COUNT: 16.7 % (ref 11.9–14.6)
GLUCOSE SERPL-MCNC: 117 MG/DL (ref 65–100)
HCT VFR BLD AUTO: 42.3 % (ref 41.1–50.3)
HGB BLD-MCNC: 13.2 G/DL (ref 13.6–17.2)
IMM GRANULOCYTES # BLD AUTO: 0 K/UL (ref 0–0.5)
IMM GRANULOCYTES NFR BLD AUTO: 0 % (ref 0–5)
LYMPHOCYTES # BLD: 2.8 K/UL (ref 0.5–4.6)
LYMPHOCYTES NFR BLD: 25 % (ref 13–44)
MCH RBC QN AUTO: 27.7 PG (ref 26.1–32.9)
MCHC RBC AUTO-ENTMCNC: 31.2 G/DL (ref 31.4–35)
MCV RBC AUTO: 88.7 FL (ref 82–102)
MONOCYTES # BLD: 1 K/UL (ref 0.1–1.3)
MONOCYTES NFR BLD: 9 % (ref 4–12)
NEUTS SEG # BLD: 7.3 K/UL (ref 1.7–8.2)
NEUTS SEG NFR BLD: 63 % (ref 43–78)
NRBC # BLD: 0 K/UL (ref 0–0.2)
PLATELET # BLD AUTO: 405 K/UL (ref 150–450)
PMV BLD AUTO: 10.2 FL (ref 9.4–12.3)
POTASSIUM SERPL-SCNC: 4.5 MMOL/L (ref 3.5–5.1)
RBC # BLD AUTO: 4.77 M/UL (ref 4.23–5.6)
SODIUM SERPL-SCNC: 140 MMOL/L (ref 133–143)
WBC # BLD AUTO: 11.5 K/UL (ref 4.3–11.1)

## 2022-12-14 PROCEDURE — 6370000000 HC RX 637 (ALT 250 FOR IP): Performed by: INTERNAL MEDICINE

## 2022-12-14 PROCEDURE — 2580000003 HC RX 258: Performed by: FAMILY MEDICINE

## 2022-12-14 PROCEDURE — 85025 COMPLETE CBC W/AUTO DIFF WBC: CPT

## 2022-12-14 PROCEDURE — 99222 1ST HOSP IP/OBS MODERATE 55: CPT | Performed by: NURSE PRACTITIONER

## 2022-12-14 PROCEDURE — 80048 BASIC METABOLIC PNL TOTAL CA: CPT

## 2022-12-14 PROCEDURE — 6370000000 HC RX 637 (ALT 250 FOR IP): Performed by: FAMILY MEDICINE

## 2022-12-14 PROCEDURE — 97530 THERAPEUTIC ACTIVITIES: CPT

## 2022-12-14 PROCEDURE — 97112 NEUROMUSCULAR REEDUCATION: CPT

## 2022-12-14 PROCEDURE — 97166 OT EVAL MOD COMPLEX 45 MIN: CPT

## 2022-12-14 PROCEDURE — 97162 PT EVAL MOD COMPLEX 30 MIN: CPT

## 2022-12-14 PROCEDURE — 2500000003 HC RX 250 WO HCPCS: Performed by: FAMILY MEDICINE

## 2022-12-14 PROCEDURE — 6360000002 HC RX W HCPCS: Performed by: FAMILY MEDICINE

## 2022-12-14 RX ORDER — AMOXICILLIN AND CLAVULANATE POTASSIUM 875; 125 MG/1; MG/1
1 TABLET, FILM COATED ORAL 2 TIMES DAILY
Qty: 14 TABLET | Refills: 0 | Status: SHIPPED | OUTPATIENT
Start: 2022-12-14 | End: 2022-12-21

## 2022-12-14 RX ORDER — DOXYCYCLINE HYCLATE 100 MG
100 TABLET ORAL 2 TIMES DAILY
Qty: 14 TABLET | Refills: 0 | Status: SHIPPED | OUTPATIENT
Start: 2022-12-14 | End: 2022-12-21

## 2022-12-14 RX ORDER — FAMOTIDINE 20 MG/1
20 TABLET, FILM COATED ORAL 2 TIMES DAILY
COMMUNITY

## 2022-12-14 RX ORDER — METAXALONE 800 MG/1
400 TABLET ORAL 3 TIMES DAILY
Status: DISCONTINUED | OUTPATIENT
Start: 2022-12-14 | End: 2022-12-14 | Stop reason: HOSPADM

## 2022-12-14 RX ORDER — ZINC GLUCONATE 50 MG
50 TABLET ORAL DAILY
COMMUNITY

## 2022-12-14 RX ADMIN — SODIUM CHLORIDE, PRESERVATIVE FREE 10 ML: 5 INJECTION INTRAVENOUS at 08:59

## 2022-12-14 RX ADMIN — FAMOTIDINE 20 MG: 20 TABLET, FILM COATED ORAL at 08:59

## 2022-12-14 RX ADMIN — METAXALONE 400 MG: 800 TABLET ORAL at 15:45

## 2022-12-14 RX ADMIN — TUBERCULIN PURIFIED PROTEIN DERIVATIVE 5 UNITS: 5 INJECTION, SOLUTION INTRADERMAL at 13:23

## 2022-12-14 RX ADMIN — OXYCODONE 5 MG: 5 TABLET ORAL at 04:50

## 2022-12-14 RX ADMIN — CEFEPIME 2000 MG: 2 INJECTION, POWDER, FOR SOLUTION INTRAVENOUS at 00:22

## 2022-12-14 RX ADMIN — ASPIRIN 81 MG: 81 TABLET, CHEWABLE ORAL at 08:59

## 2022-12-14 ASSESSMENT — PAIN DESCRIPTION - FREQUENCY: FREQUENCY: INTERMITTENT

## 2022-12-14 ASSESSMENT — PAIN DESCRIPTION - PAIN TYPE: TYPE: ACUTE PAIN

## 2022-12-14 ASSESSMENT — PAIN DESCRIPTION - LOCATION: LOCATION: FOOT

## 2022-12-14 ASSESSMENT — PAIN DESCRIPTION - ORIENTATION: ORIENTATION: RIGHT;LEFT

## 2022-12-14 ASSESSMENT — PAIN SCALES - GENERAL: PAINLEVEL_OUTOF10: 8

## 2022-12-14 ASSESSMENT — PAIN DESCRIPTION - DESCRIPTORS: DESCRIPTORS: ACHING;THROBBING

## 2022-12-14 ASSESSMENT — PAIN DESCRIPTION - ONSET: ONSET: GRADUAL

## 2022-12-14 ASSESSMENT — PAIN - FUNCTIONAL ASSESSMENT: PAIN_FUNCTIONAL_ASSESSMENT: PREVENTS OR INTERFERES SOME ACTIVE ACTIVITIES AND ADLS

## 2022-12-14 NOTE — DISCHARGE SUMMARY
Hospitalist Discharge Summary   Admit Date:  2022  3:51 PM   DC Note date: 2022  Name:  Parker Covarrubias   Age:  78 y.o. Sex:  male  :  1955   MRN:  [id number]   Room:    PCP:  Verona Robledo MD    Presenting Complaint: Wound Infection     Initial Admission Diagnosis: Multiple sclerosis ([address] [G35]  Peripheral vascular disease ([address] [I73.9]  Cellulitis of left foot [L03.116]  Cellulitis of right foot [C22.562]  Skin ulcers of foot, bilateral ([address] [L97.519, L97.529]     Problem List for this Hospitalization (present on admission):    Principal Problem:    Skin ulcers of foot, bilateral ([address]  Active Problems:    COPD (chronic obstructive pulmonary disease) ([address]    MS (multiple sclerosis) ([address]    Idiopathic chronic venous hypertension of left lower extremity with ulcer and inflammation (HCC)    Idiopathic chronic venous hypertension of right lower extremity with ulcer and inflammation (HCC)    PAD (peripheral artery disease) ([address]  Resolved Problems:    * No resolved hospital problems. [facility] Course:  Patient is a 78 y.o. male who presented to the ED for cc open sores to feet. Admits to trauma from wheelchair to his feet. Nothing seems to make better. Hx of COPD, former smoker, MS, methamphetamine abuse now sober, PVD following Dr. Kayla Andrade. Dr Kayla Andrade was planning for right common femoral to below knee popliteal artery bypass with ipsilateral vein earlier this year but was cancelled since wounds were starting to heal.      Left and right foot x rays with no obvious osteomyelitis    Patient admitted and started on IV antibiotics. Seen by wound care and vascular. Vascular surgery recommends continue wound care. Pt not a candidate for vascular procedure given his knee contractures. Will discharge home with oral antbx course and request CM to arrange home heatlh care.  Reportedly patient has been refusing wound care evals, counseled on importance of compliance and 18 20   CREATININE 1.00 1.10   LABGLOM >60 >60   CALCIUM 9.5 9.1   GLUCOSE 124* 117*      CBC Recent Labs     12/13/22  1457 12/14/22  0518   WBC 10.9 11.5*   RBC 5.02 4.77   HGB 14.1 13.2*   HCT 45.0 42.3   MCV 89.6 88.7   MCH 28.1 27.7   MCHC 31.3* 31.2*   RDW 16.6* 16.7*    405   MPV 10.1 10.2   NRBC 0.00 0.00   SEGS 68 63   LYMPHOPCT 24 25   EOSRELPCT 2 3   MONOPCT 6 9   BASOPCT 0 0   IMMGRAN 0 0   SEGSABS 7.3 7.3   LYMPHSABS 2.7 2.8   EOSABS 0.2 0.3   MONOSABS 0.7 1.0   BASOSABS 0.0 0.0   ABSIMMGRAN 0.0 0.0      LFT Recent Labs     12/13/22  1457   BILITOT 0.5   ALKPHOS 119   AST 17   ALT 20   PROT 7.3   LABALBU 3.3   GLOB 4.0      Cardiac  No results found for: NTPROBNP, TROPHS   Coags No results found for: PROTIME, INR, APTT   A1c No results found for: LABA1C, EAG   Lipids No results found for: CHOL, LDLCALC, LABVLDL, HDL, CHOLHDLRATIO, TRIG   Thyroid  No results found for: Donnice Humbles     Most Recent UA No results found for: COLORU, APPEARANCE, SPECGRAV, LABPH, PROTEINU, GLUCOSEU, KETUA, BILIRUBINUR, BLOODU, UROBILINOGEN, NITRU, LEUKOCYTESUR, WBCUA, RBCUA, EPITHUA, BACTERIA, LABCAST, MUCUS     No results for input(s): CULTURE in the last 720 hours.     All Labs from Last 24 Hrs:  Recent Results (from the past 24 hour(s))   Basic Metabolic Panel w/ Reflex to MG    Collection Time: 12/14/22  5:18 AM   Result Value Ref Range    Sodium 140 133 - 143 mmol/L    Potassium 4.5 3.5 - 5.1 mmol/L    Chloride 113 (H) 101 - 110 mmol/L    CO2 19 (L) 21 - 32 mmol/L    Anion Gap 8 2 - 11 mmol/L    Glucose 117 (H) 65 - 100 mg/dL    BUN 20 8 - 23 MG/DL    Creatinine 1.10 0.8 - 1.5 MG/DL    Est, Glom Filt Rate >60 >60 ml/min/1.73m2    Calcium 9.1 8.3 - 10.4 MG/DL   CBC with Auto Differential    Collection Time: 12/14/22  5:18 AM   Result Value Ref Range    WBC 11.5 (H) 4.3 - 11.1 K/uL    RBC 4.77 4.23 - 5.6 M/uL    Hemoglobin 13.2 (L) 13.6 - 17.2 g/dL    Hematocrit 42.3 41.1 - 50.3 %    MCV 88.7 82 - 102 FL    MCH 27.7 26.1 - 32.9 PG    MCHC 31.2 (L) 31.4 - 35.0 g/dL    RDW 16.7 (H) 11.9 - 14.6 %    Platelets 508 609 - 777 K/uL    MPV 10.2 9.4 - 12.3 FL    nRBC 0.00 0.0 - 0.2 K/uL    Differential Type AUTOMATED      Seg Neutrophils 63 43 - 78 %    Lymphocytes 25 13 - 44 %    Monocytes 9 4.0 - 12.0 %    Eosinophils % 3 0.5 - 7.8 %    Basophils 0 0.0 - 2.0 %    Immature Granulocytes 0 0.0 - 5.0 %    Segs Absolute 7.3 1.7 - 8.2 K/UL    Absolute Lymph # 2.8 0.5 - 4.6 K/UL    Absolute Mono # 1.0 0.1 - 1.3 K/UL    Absolute Eos # 0.3 0.0 - 0.8 K/UL    Basophils Absolute 0.0 0.0 - 0.2 K/UL    Absolute Immature Granulocyte 0.0 0.0 - 0.5 K/UL       No Known Allergies  Immunization History   Administered Date(s) Administered    PPD Test 12/14/2022       Recent Vital Data:  Patient Vitals for the past 24 hrs:   Temp Pulse Resp BP SpO2   12/14/22 1237 98.2 °F (36.8 °C) 83 18 133/84 95 %   12/14/22 0800 97.6 °F (36.4 °C) 87 20 122/84 92 %   12/14/22 0600 -- -- -- (!) 115/103 90 %   12/14/22 0415 -- -- -- 127/74 90 %   12/14/22 0400 -- -- -- 124/74 90 %   12/14/22 0315 -- -- -- 115/70 90 %   12/14/22 0241 -- -- -- 127/77 91 %   12/14/22 0220 -- -- -- 121/71 91 %   12/14/22 0156 -- -- -- 116/74 91 %   12/14/22 0140 -- -- -- 119/82 90 %   12/14/22 0118 -- -- -- (!) 127/99 91 %   12/14/22 0102 -- -- -- 121/72 92 %   12/14/22 0042 -- -- -- 127/87 91 %   12/14/22 0022 -- -- -- 128/76 91 %   12/13/22 2358 -- -- -- 124/81 92 %   12/13/22 2340 -- -- -- 119/78 91 %   12/13/22 2318 -- -- -- 123/80 90 %   12/13/22 2300 -- -- -- -- 92 %   12/13/22 2257 -- -- -- 122/80 93 %   12/13/22 2237 -- -- -- 126/79 --   12/13/22 2219 -- -- -- 130/79 91 %   12/13/22 2157 -- -- -- 137/82 92 %   12/13/22 2141 -- -- -- -- 92 %   12/13/22 2140 -- -- -- 124/77 --   12/13/22 2118 -- -- -- 118/83 92 %   12/13/22 2057 -- -- -- (!) 147/90 --   12/13/22 2027 -- -- -- -- 95 %   12/13/22 2020 -- -- -- (!) 142/80 97 %   12/13/22 2000 -- -- -- 135/77 99 %   12/13/22 1940 -- -- -- 129/85 93 %   12/13/22 1919 -- -- -- 121/86 97 %   12/13/22 1859 -- -- -- 134/84 96 %   12/13/22 1845 -- -- -- (!) 150/97 93 %       Oxygen Therapy  SpO2: 95 %  Pulse via Oximetry: 87 beats per minute  O2 Device: None (Room air)    Estimated body mass index is 22.67 kg/m² as calculated from the following:    Height as of 4/28/22: 5' 3.5\" (1.613 m). Weight as of this encounter: 130 lb (59 kg). Intake/Output Summary (Last 24 hours) at 12/14/2022 1525  Last data filed at 12/14/2022 1421  Gross per 24 hour   Intake 240 ml   Output 275 ml   Net -35 ml         Physical Exam:    General:    Well nourished. No overt distress  Head:  Normocephalic, atraumatic  Eyes:  Sclerae appear normal.  Pupils equally round. HENT:  Nares appear normal, no drainage. Moist mucous membranes  Neck:  No restricted ROM. Trachea midline  CV:   RRR. No m/r/g. No JVD  Lungs:   CTAB. No wheezing, rhonchi, or rales. Respirations even, unlabored  Abdomen:   Soft, nontender, nondistended. Extremities: Warm and dry. Bilateral dorsal and lateral foot wounds with eschar. Minimaly erythema, no discharge  Skin:     No rashes. Normal coloration  Neuro:  CN II-XII grossly intact. Psych:  Normal mood and affect. Signed:  Kelsea Masterson DO    Part of this note may have been written by using a voice dictation software. The note has been proof read but may still contain some grammatical/other typographical errors.

## 2022-12-14 NOTE — PROGRESS NOTES
ACUTE PHYSICAL THERAPY GOALS:   (Developed with and agreed upon by patient and/or caregiver.)    (1.) Maura Su  will move from supine to sit and sit to supine  with MINIMAL ASSIST within 7 treatment day(s). (2.) Maura Su will transfer from bed to chair and chair to bed with STAND BY ASSIST using the least restrictive device within 7 treatment day(s). (3.) Maura Su will perform seated static and dynamic balance activities x 20 minutes with STAND BY ASSIST to improve safety within 7 treatment day(s). (4.) Maura Su will perform bilateral lower extremity exercises x 20 min for HEP with INDEPENDENCE to improve strength, endurance, and functional mobility within 7 treatment day(s). PHYSICAL THERAPY Initial Assessment, Daily Note, and AM  (Link to Caseload Tracking: PT Visit Days : 1  Acknowledge Orders  Time In/Out  PT Charge Capture  Rehab Caseload Tracker    Maura Su is a 78 y.o. male   PRIMARY DIAGNOSIS: Skin ulcers of foot, bilateral (HCC)  Multiple sclerosis ([facility] [G35]  Peripheral vascular disease ([facility] [I73.9]  Cellulitis of left foot [L03.116]  Cellulitis of right foot [L03.115]  Skin ulcers of foot, bilateral ([address] [L97.519, L97.529]       Reason for Referral: Generalized Muscle Weakness (M62.81)  Other abnormalities of gait and mobility (R26.89)  Inpatient: Payor: Hollie Bui / Plan: [employer] / Product Type: *No Product type* /     ASSESSMENT:     REHAB RECOMMENDATIONS:   Recommendation to date pending progress:  Setting:  Home Health Therapy    Equipment:    To Be Determined     ASSESSMENT:  Mr. Geraldo Kessler is a 78year old M who presents B ulcers to feet. Pt has MS and primarily uses a manual w/c for mobility, reports he is getting a power chair soon. He lives with friends and says he sometimes needs assistance with bed mobility but is typically able to transfer independently, occassionally using slide board.  This date pt performs mobility including bed mobility with modA of 2. Pt with B hamstring contractures, R LE worse than L LE, unable to reach full knee extension even with over pressure. He required CGA-Justine to maintain sitting balance. He was able to laterally scoot with min-modA of 2. Stretcher in ER is likely a poor representation of pt mobility. Recommend HH PT at d/c, pt has handicap accessible home with good support. Pt presents as functioning below his baseline, with deficits in mobility including transfers, gait, balance, and activity tolerance. Pt will benefit from skilled therapy services to address stated deficits to promote return to highest level of function, independence, and safety. Will continue to follow.      [address] [facility] [address] [address] AM-PAC 6 Clicks Basic Mobility Inpatient Short Form  AM-PAC Mobility Inpatient   How much difficulty turning over in bed?: A Lot  How much difficulty sitting down on / standing up from a chair with arms?: Unable  How much difficulty moving from lying on back to sitting on side of bed?: A Lot  How much help from another person moving to and from a bed to a chair?: A Lot  How much help from another person needed to walk in hospital room?: Total  How much help from another person for climbing 3-5 steps with a railing?: Total  AM-PAC Inpatient Mobility Raw Score : 9  AM-PAC Inpatient T-Scale Score : 30.55  Mobility Inpatient CMS 0-100% Score: 81.38  Mobility Inpatient CMS G-Code Modifier : CM    SUBJECTIVE:   Mr. Steffen Mejia states, \"it's been rough lately\"     Social/Functional Lives With: Friend(s)  Type of Home: Apartment  Home Layout: One level  Home Access: Ramped entrance  Has the patient had two or more falls in the past year or any fall with injury in the past year?: No  ADL Assistance: Independent  Ambulation Assistance: Non-ambulatory  Transfer Assistance: Independent    OBJECTIVE:     PAIN: VITALS / O2: PRECAUTION / Angelika Garre / DRAINS:   Pre Treatment:   Pain Assessment: None - Denies Pain      Post Treatment: 0 Vitals        Oxygen      Continuous Pulse Oximetry    RESTRICTIONS/PRECAUTIONS:                    GROSS EVALUATION: B LE Intact Impaired (Comments):   AROM []  Decreased global extension   PROM [] B hamstring contractures, R worse than L, lacking full knee extension   Strength []  Generally very weak   Balance [] Posture: Fair  Sitting - Static: Fair, +  Sitting - Dynamic: Fair   Posture [] Forward Head  Rounded Shoulders   Sensation []     Coordination []      Tone []     Edema []    Activity Tolerance [] Patient limited by fatigue    []      COGNITION/  PERCEPTION: Intact Impaired (Comments):   Orientation [x]     Vision [x]     Hearing [x]     Cognition  [x]       MOBILITY: I Mod I S SBA CGA Min Mod Max Total  NT x2 Comments:   Bed Mobility    Rolling [] [] [] [] [] [] [x] [] [] [] [x]    Supine to Sit [] [] [] [] [] [] [x] [] [] [] [x]    Scooting [] [] [] [] [] [x] [x] [] [] [] [x]    Sit to Supine [] [] [] [] [] [] [x] [] [] [] [x]    Transfers    Sit to Stand [] [] [] [] [] [] [] [] [] [x] []    Bed to Chair [] [] [] [] [] [] [] [] [] [x] []    Stand to Sit [] [] [] [] [] [] [] [] [] [x] []     [] [] [] [] [] [] [] [] [] [] []    I=Independent, Mod I=Modified Independent, S=Supervision, SBA=Standby Assistance, CGA=Contact Guard Assistance,   Min=Minimal Assistance, Mod=Moderate Assistance, Max=Maximal Assistance, Total=Total Assistance, NT=Not Tested    GAIT: I Mod I S SBA CGA Min Mod Max Total  NT x2 Comments:   Level of Assistance [] [] [] [] [] [] [] [] [] [x] []    Distance   N/a    DME N/A    Gait Quality N/A    Weightbearing Status      Stairs      I=Independent, Mod I=Modified Independent, S=Supervision, SBA=Standby Assistance, CGA=Contact Guard Assistance,   Min=Minimal Assistance, Mod=Moderate Assistance, Max=Maximal Assistance, Total=Total Assistance, NT=Not Tested    PLAN:   FREQUENCY AND DURATION: 3 times/week for duration of hospital stay or until stated goals are met, whichever comes first.    THERAPY PROGNOSIS: Good    PROBLEM LIST:   (Skilled intervention is medically necessary to address:)  Decreased ADL/Functional Activities  Decreased Activity Tolerance  Decreased AROM/PROM  Decreased Balance  Decreased Strength  Decreased Transfer Abilities  Increased Pain INTERVENTIONS PLANNED:   (Benefits and precautions of physical therapy have been discussed with the patient.)  Self Care Training  Therapeutic Activity  Therapeutic Exercise/HEP  Neuromuscular Re-education  Education       TREATMENT:   EVALUATION: MODERATE COMPLEXITY: (Untimed Charge)    TREATMENT:   Co-Treatment PT/OT necessary due to patient's decreased overall endurance/tolerance levels, as well as need for high level skilled assistance to complete functional transfers/mobility and functional tasks  Therapeutic Activity (24 Minutes): Therapeutic activity included Rolling, Supine to Sit, Sit to Supine, Scooting, Lateral Scooting, and Sitting balance  to improve functional Activity tolerance, Balance, Mobility, Strength, and ROM. TREATMENT GRID:  N/A    AFTER TREATMENT PRECAUTIONS: Bed, Bed/Chair Locked, Call light within reach, Needs within reach, and RN notified    INTERDISCIPLINARY COLLABORATION:  RN/ PCT and OT/ LIMON    EDUCATION: Education Given To: Patient  Education Provided: Role of Therapy;Plan of Care; Fall Prevention Strategies  Education Method: Verbal  Barriers to Learning: None  Education Outcome: Verbalized understanding    TIME IN/OUT:  Time In: 0957  Time Out: [address] [place]  Minutes: 34 Stefanie Wright PT

## 2022-12-14 NOTE — CONSULTS
History and Physical/Surgical Consult   Lamont Beckford    Admit date: 2022    MRN: [id number]     : 1955     Age: 78 y.o.          2022 1:23 PM    Subjective/HPI:   This patient is a 78 y.o. seen and evaluated for bilateral foot wounds. He was originally scheduled for a bypass earlier this year to his right leg but the wounds improved and the bypass was canceled. He does have MS and does not ambulate. He mostly gets around in a wheelchair and the wounds are related to trauma from the wheelchair. He is not on any medication for his MS. He has a contracture to his right leg. Review of Systems  Constitutional: negative  Respiratory: negative  Cardiovascular: negative  Musculoskeletal:positive for pain to both lower extremities and bilateral foot wounds  Past Medical History:   Diagnosis Date    Cellulitis     Chronic obstructive pulmonary disease (HCC)     Generalized weakness     Methamphetamine abuse (HCC)     Multiple sclerosis (HCC)     wheelchair bound; can't extend lower extremties    PVD (peripheral vascular disease) ([address]     has stent in groin    Sacral ulcer ([address]     Skin ulcers of foot, bilateral ([address]     chronic      Past Surgical History:   Procedure Laterality Date    VASCULAR SURGERY  2022    Ultrasound-guided access of the left common femoral artery with placement of a catheter in the aorta for aortogram.Bilateral lower extremity arteriograms.       No Known Allergies   Social History     Tobacco Use    Smoking status: Former     Types: Cigarettes     Quit date: 2020     Years since quittin.0    Smokeless tobacco: Never    Tobacco comments:     Quit smoking: continues non-smoking status   Substance Use Topics    Alcohol use: Never      Social History     Social History Narrative    Not on file     Family History   Problem Relation Age of Onset    Stroke Mother       Current Facility-Administered Medications   Medication Dose Route Frequency sodium chloride flush 0.9 % injection 5-40 mL  5-40 mL IntraVENous 2 times per day    sodium chloride flush 0.9 % injection 5-40 mL  5-40 mL IntraVENous PRN    0.9 % sodium chloride infusion   IntraVENous PRN    polyethylene glycol (GLYCOLAX) packet 17 g  17 g Oral Daily PRN    acetaminophen (TYLENOL) tablet 650 mg  650 mg Oral Q6H PRN    Or    acetaminophen (TYLENOL) suppository 650 mg  650 mg Rectal Q6H PRN    tuberculin injection 5 Units  5 Units IntraDERmal Once    oxyCODONE (ROXICODONE) immediate release tablet 5 mg  5 mg Oral Q4H PRN    ipratropium-albuterol (DUONEB) nebulizer solution 1 ampule  1 ampule Inhalation Q4H PRN    famotidine (PEPCID) tablet 20 mg  20 mg Oral BID    dicyclomine (BENTYL) capsule 10 mg  10 mg Oral TID PRN    aspirin chewable tablet 81 mg  81 mg Oral Daily    vancomycin (VANCOCIN) 1,000 mg in sodium chloride 0.9 % 250 mL IVPB ([id number])  1,000 mg IntraVENous Q18H    cefepime (MAXIPIME) 2,000 mg in sodium chloride 0.9 % 50 mL IVPB mini-bag  2,000 mg IntraVENous Q12H     Objective:     Vitals:    12/14/22 0415 12/14/22 0600 12/14/22 0800 12/14/22 1237   BP: 127/74 (!) 115/103 122/84 133/84   Pulse:   87 83   Resp:   20 18   Temp:   97.6 °F (36.4 °C) 98.2 °F (36.8 °C)   TempSrc:   Oral Oral   SpO2: 90% 90% 92% 95%   Weight:         No intake/output data recorded. 12/12 1901 - 12/14 0700  In: -   Out: 275 [Urine:275]  Physical Exam:   Gen- the patient is well developed and in no acute distress  HEENT- PERRL, EOMI, no scleral icterus       nose without alar flaring or epistaxis                  oral muscosa moist without cyanosis  Neck- no JVD or retractions  Lungs- resp even/unlab   Heart- RRR   Abd- soft  Ext- non-palpable distal pulses. Bilateral foot wounds  Skin- no jaundice or rashes  Neuro- alert and oriented x 3.      Data Review   Recent Labs     12/13/22  1457 12/14/22  0518   WBC 10.9 11.5*   HGB 14.1 13.2*   HCT 45.0 42.3    405     Recent Labs     12/13/22  1458 12/14/22  0518    140   K 4.1 4.5   * 113*   CO2 26 19*   BUN 18 20       Assessment/Plan:      Patient is a 78year old male who is admitted for bilateral foot wounds. He is a non-ambulator d/t his MS and has bilateral knee contractures. He is not a candidate for a lower extremity revascularization. Recommend continued wound care. The other alternative would be a definitve above the knee amputation which I do not believe is where he is at this time. I would recommend continued wound care at this time. Follow-up PRN. Formation of plan with Dr. Isabelle Clifford        50 minutes of time was spent on this encounter with greater than 50% of time in direct patient contact including patient education, counseling, review of medical history and imaging, and medical decision making.        Cristal Care Standard, APRN - CNP

## 2022-12-14 NOTE — CARE COORDINATION
Bennie Agosto met with patient to discuss discharge planning. Patient is alert, lying in bed, and on room air. Patient lives with friends in a handicap accessible apartment with level entry. Patient is wheelchair bound at baseline. Friends assist patient as needed. Patient has been to rehab and has had home health in the patient. Demographics and PCP (Dr. Say Hoyt [phone]) verified with patient. Patient uses [address] [employer] in [facility]. Patient with discharge orders for today. PT/OT recommending home health, referrals made to [facility]. Patient requesting ambulance transportation home.; [facility] transport arranged with  scheduled for 1800. CM following until discharged.       ASSESSMENT NOTE    Attending Physician: Kamilah Ramirez, DO  Admit Problem: Multiple sclerosis ([address] Tarpley Lank  Peripheral vascular disease ([address] [I73.9]  Cellulitis of left foot [L03.116]  Cellulitis of right foot [L03.115]  Skin ulcers of foot, bilateral ([address] [L97.519, L97.529]  Date/Time of Admission: 12/13/2022  3:51 PM  Problem List:  Patient Active Problem List   Diagnosis    MS (multiple sclerosis) ([address]    Idiopathic chronic venous hypertension of left lower extremity with ulcer and inflammation (HCC)    Idiopathic chronic venous hypertension of right lower extremity with ulcer and inflammation (HCC)    PAD (peripheral artery disease) ([address]    Skin ulcers of foot, bilateral (HCC)    COPD (chronic obstructive pulmonary disease) [facility])       Service Assessment  Patient Orientation Alert and Oriented   Cognition Alert   History Provided By Patient   Primary Caregiver Family   Accompanied By/Relationship     Support Systems Friends/Neighbors   Patient's Healthcare Decision Maker is: Legal Next of Edmarva 70   PCP Verified by CM Yes (Dr. Say Hoyt [phone])   Last Visit to PCP Within last 3 months   Prior Functional Level Independent in ADLs/IADLs   Current Functional Level Independent in ADLs/IADLs   Can patient return to prior living arrangement Yes   Ability to make needs known: Good   Family able to assist with home care needs: No   Would you like for me to discuss the discharge plan with any other family members/significant others, and if so, who? No   Financial Resources Medicare   Community Resources     CM/SW Referral       Social/Functional History  Lives With Friend(s)   Type of Home Apartment   Home Layout One level   Home Access Level entry   Entrance Stairs - Number of Steps     Entrance Stairs - Rails     Bathroom Shower/Tub Walk-in shower   Bathroom Toilet     Bathroom Equipment     Bathroom Accessibility Accessible   Home Equipment [address] Help From Friend(s)   ADL Assistance Independent   Bath     Dressing     Grooming     Feeding     [address] Work     Driving     Shopping          Other (Comment)     [address] Paying/Finance [address] [facility] Management     Other (Comment)     Ambulation Assistance Non-ambulatory   Transfer Assistance Independent   Active  No   Patient's  Info     Mode of Transportation     Education     Occupation Unemployed   Type of Occupation       Discharge Planning   Type of [address] [facility] Prior To Admission [address], Transportation   DME Wheelchair   DME     DME Ordered? No   Potential Assistance Purchasing Medications No   Meds-to-Beds: Does the patient want to have any new prescriptions delivered to bedside prior to discharge?      Type of Home Care Services     Patient expects to be discharged to: [facility] Follow Up Appointment: Best Day/Time     One/Two Story Residence: One story   # of Interior Steps     Height of Each Step (in)     [employer] Available     History of Falls? Services At/After Discharge  Transition of Care Consult (CM Consult): Home Health, Discharge Planning   Internal Home Health     Internal Hospice     Reason Outside Agency [address]     Partner SNF     Reason Why Partner SNF Not Chosen     Internal Comfort Care     Reason Outside [address] Discharge [address] Resource Information Provided? No   Mode of Transport at Discharge [address] Time of Discharge     Confirm Follow Up Transport Self     Condition of Participation: Discharge Planning  The plan for Transition of Care is related to the following treatment goals: Return to [facility] with home health support   The Patient and/or Patient Representative was provided with a Choice of Provider? Patient   Name of the Patient Representative who was provided with the Choice of Provider and agrees with the Discharge Plan? The Patient and/or Patient Representative Agree with the Discharge Plan? Yes   Freedom of Choice list was provided with basic dialogue that supports the individualized plan of care/goals, treatment preferences, and shares the quality data associated with the providers?  Yes     Documentation for Discharge Appeal  Discharge Appealed by     Date notified by QIO of appeal request:     Time notified by QIO of appeal request:     Detailed Notice of Discharge given to:     Date Notice of Discharge given:     Time Notice of Discharge given:     Date records sent to [address]     Time records sent to [address]     Date Notified of Outcome     Time Notified of Outcome     Outcome of appeal           Janneth Moulton RN 12/14/22 4:10 PM

## 2022-12-14 NOTE — WOUND CARE
Bilateral lower leg resting pain especially when dependant right greater than left. He is wheelchair bound and does have contractures at the knee. Bilateral feet with ulcers most consistent with arterial ulcers, the ulcers are covered in a mix of dry exudate and a \"foot powder\" patient has been applying daily, also may be some scab. Patient refuses to all the areas to be cleaned well, he refuses a bandage at this time. Discussed that betadine may help him dry the areas up with out the powder caking on, he agreed to try. Betadine paint to both feet. Noted vascular notes and recommendations from today 12/14/22.

## 2022-12-14 NOTE — DISCHARGE INSTRUCTIONS
Learning About Arterial Skin Ulcers  What is an arterial skin ulcer? An arterial ulcer is a painful, deep sore or wound in the skin of the lower leg or foot. The ulcer doesn't heal as you'd expect an ordinary sore to heal. That's because there isn't enough blood flowing to the area. Blood supplies oxygen and nutrients to the tissues. The ulcer is usually found on skin over pressure points such as the tip of the toe, the heel, or the inner side of the ankle bone. Often it occurs where the skin rubs against clothing or shoes. It may also happen at the site of an injury, even a small one that you wouldn't expect to cause a problem. Sometimes the ulcer can start as a small dark spot that is painful and grows bigger. If left untreated, the ulcer may lead to the death or decay of the tissue around the ulcer (gangrene). What causes it? Most arterial skin ulcers are caused by poor blood flow in the lower leg along with an injury on the leg or foot. This is often due to narrowing of the arteries that supply blood to the leg. An injury, even a small one, can become an ulcer if not enough blood can reach it to help it heal. You may get an ulcer on your leg or foot when you scrape or bump it. Or you may get one when something is pressing on it. For example, you may get an ulcer when you wear shoes that rub against your skin. What puts you at risk of having one? Many things can put you at risk of having an arterial skin ulcer. These include:  Smoking. High cholesterol. High blood pressure. Diabetes. Obesity. Lack of exercise. A history of heart attacks or strokes. Older age. What are the symptoms? You may have an arterial skin ulcer if:  You have sharp pain with the ulcer. It may be worse at night. There may be less pain if you hang the leg over the side of the bed or you sleep in a chair. The ulcer looks like a dry sore with a ridge of dead skin around it or deep inside it.   Skin around the ulcer may be dark red when the leg is lowered and pale when it's raised. It may be cool to the touch, shiny, thin, and without hair. The ulcer won't heal, even if it's kept clean and bandaged. How is it diagnosed? Your doctor will look at your ulcer and ask questions about your health. The doctor may press on the skin around the ulcer and check the pulses on your leg and foot. You may also have tests to show how well blood is flowing through the arteries of your leg. How is it treated? An arterial skin ulcer is treated by improving blood flow in the area. The treatment may include:  A procedure to open a narrowed or blocked blood vessel (angioplasty). Or surgery may be done to attach a new artery on both ends of a blockage so blood can go around the part of the blood vessel that is blocked (bypass surgery). Medicines, such as aspirin, if needed to help prevent more problems with your blood vessels or blood flow. Treatment also includes care of the wound, such as:  Using special coverings on the ulcer that will help protect the area and prevent infection. Taking antibiotics if there is an infection. Removing dead tissue from the wound (debridement). If you have pain, your doctor will help you manage it. In some cases, the tissue in the toes or the foot is too damaged to be saved. When that happens, the affected part of the leg or foot may have to be removed (amputated). How can you care for your ulcer? There are things you can do to help your ulcer heal and lower your risk of getting another one. Take care of the ulcer. Follow your doctor's instructions about keeping pressure off the leg or foot ulcer. You may need to use crutches or a wheelchair. Or you may wear a cast or a walking boot. Follow your doctor's instructions for how to clean the ulcer and change the bandage. If your doctor prescribed antibiotics, take them as directed. Protect your lower legs and feet from injury.    This is especially important if you have poor blood flow in your legs. Keep the skin of your legs and feet moisturized. Check the skin of your feet daily for small cuts and scrapes. Don't walk barefoot. Don't soak your feet for long periods. Wear shoes that fit well and protect your feet. Have a healthy lifestyle. Don't smoke. Smoking affects blood flow and slows healing. Be active. If your doctor says it's okay, walk or ride a bike to improve blood flow in your legs. Swimming is a good choice after your wound heals. Lose weight, if you need to. Eat heart-healthy foods. These foods include vegetables, fruits, nuts, beans, lean meat, fish, and whole grains. Limit sodium, alcohol, and sugar. Manage other health problems. Take medicine to treat diabetes, if your doctor prescribes it. Having diabetes can make it hard for wounds to heal. So try to keep your blood sugar in its target range. Take medicine to treat high blood pressure or high cholesterol, if your doctor prescribes it. Current as of: August 2, 2022               Content Version: 13.5  © 2006-2022 [employer]wise, Incorporated. Care instructions adapted under license by [facility] ([facility]). If you have questions about a medical condition or this instruction, always ask your healthcare professional. [address] any warranty or liability for your use of this information.

## 2022-12-14 NOTE — PROGRESS NOTES
ACUTE OCCUPATIONAL THERAPY GOALS:   (Developed with and agreed upon by patient and/or caregiver.)  1. Patient will complete upper body bathing and dressing with SBA and adaptive equipment as needed. 2. Patient will complete bed mobility with modified independence. .   3. Patient will tolerate 30 minutes of OT treatment with 2-3 rest breaks to increase activity tolerance for ADLs. 4. Patient will complete scooting with modified independence to increase strength for functional transfers with a sliding board. 5. Patient will complete grooming tasks with set-up sitting edge of bed with supervision for sitting balance. 6. Patient will complete therapeutic exercises for 8 minutes to improve strength for ADL/functional transfers. Timeframe: 7 visits       OCCUPATIONAL THERAPY Initial Assessment, Daily Note, and AM       OT Visit Days: 1  Acknowledge Orders  Time  OT Charge Capture  Rehab Caseload Tracker      Amy Duckworth is a 78 y.o. male   PRIMARY DIAGNOSIS: Skin ulcers of foot, bilateral ([address]  Multiple sclerosis ([address] [G35]  Peripheral vascular disease ([address] [I73.9]  Cellulitis of left foot [L03.116]  Cellulitis of right foot [L03.115]  Skin ulcers of foot, bilateral ([address] [L97.519, L97.529]       Reason for Referral: Generalized Muscle Weakness (M62.81)  Other lack of cordination (R27.8)  Inpatient: Payor: Allison [employer] / Plan: [employer] G[employer] PLUS HMO / Product Type: *No Product type* /     ASSESSMENT:     REHAB RECOMMENDATIONS:   Recommendation to date pending progress:  Setting:  Home Health Therapy    Equipment:    To Be Determined     ASSESSMENT:  Mr. Shawna Huntley presents to the hospital with B skin ulcers, PVC, and cellulitis of B feet. Pt's hx is significant for multiple sclerosis. Pt reports having a good set-up at his apartment at home and states he lives with supportive friends.  Pt has wounds to B feet and it was noted that the R LE rests in hip/knee flexion with inability to activity extend the leg. Pt is unsure how long his leg has been positioned like this but states its been a while. Pt limited with bed mobility currently needing moderate assistance x 2. Pt requires additional time to get L foot touching to the floor and required CGA-min A for sitting balance. Pt worked on side scooting edge of bed with min-mod A x 2 before returning to supine. A pillow was placed to offload the R heel. Pt appears to be functioning below his baseline and will benefit from OT services to address stated goals and plan of care.       [facility] [address] [address] [facility] [facility] Clicks Daily Activity Inpatient Short Form:    AM-PAC Daily Activity Inpatient   How much help for putting on and taking off regular lower body clothing?: A Lot  How much help for Bathing?: A Lot  How much help for Toileting?: A Lot  How much help for putting on and taking off regular upper body clothing?: A Little  How much help for taking care of personal grooming?: A Little  How much help for eating meals?: A Little  [facility] Raw Score: 15  AM-PAC Inpatient ADL T-Scale Score : 34.69  ADL Inpatient CMS 0-100% Score: 56.46  ADL Inpatient CMS G-Code Modifier : CK           SUBJECTIVE:     Mr. Dionicio Menjivar states, \"My sink in the kitchen is lower and everything is handicap accessible\"     Social/Functional Lives With: Friend(s)  Type of Home: Apartment  Home Layout: One level  Home Access: Ramped entrance  Has the patient had two or more falls in the past year or any fall with injury in the past year?: No  ADL Assistance: Independent  Ambulation Assistance: Non-ambulatory  Transfer Assistance: Independent    OBJECTIVE:     Rito Smith / Ben Sarah / Adolph Counter:  monitoring    RESTRICTIONS/PRECAUTIONS:       PAIN: Roselie Cancer / O2:   Pre Treatment:   Pain Assessment: None - Denies Pain      Post Treatment: same       Vitals          Oxygen            GROSS EVALUATION: INTACT IMPAIRED   (See Comments)   UE AROM [] []Decreased finger extension in B hands UE PROM [] []   Strength []  Weakness in all extremities; R LE non-functional      Posture / Balance [] Posture: Fair  Sitting - Static: Fair  Sitting - Dynamic: Fair, -   Sensation []     Coordination []  Decreased in B hands     Tone []   Hypertonicity in the R LE     Edema []    Activity Tolerance []  Limited due to pain/fatigue      Hand Dominance R [] L []      COGNITION/  PERCEPTION: INTACT IMPAIRED   (See Comments)   Orientation []     Vision []  glasses   Hearing []  Appears functional    Cognition  [] Overall Cognitive Status: Exceptions  Memory:  (decreased memory regarding his R LE and functioning)  Insights: Decreased awareness of deficits   Perception []       MOBILITY: I Mod I S SBA CGA Min Mod Max Total  NT x2 Comments:   Bed Mobility    Rolling [] [] [] [] [] [] [x] [] [] [] [x]    Supine to Sit [] [] [] [] [] [] [x] [] [] [] [x]    Scooting [] [] [] [] [] [x] [x] [] [] [] [x]    Sit to Supine [] [] [] [] [] [] [x] [] [] [] []    Transfers    Sit to Stand [] [] [] [] [] [] [] [] [] [x] []    Bed to Chair [] [] [] [] [] [] [] [] [] [x] []    Stand to Sit [] [] [] [] [] [] [] [] [] [x] []    Tub/Shower [] [] [] [] [] [] [] [] [] [x] []     Toilet [] [] [] [] [] [] [] [] [] [x] []      [] [] [] [] [] [] [] [] [] [] []    I=Independent, Mod I=Modified Independent, S=Supervision/Setup, SBA=Standby Assistance, CGA=Contact Guard Assistance, Min=Minimal Assistance, Mod=Moderate Assistance, Max=Maximal Assistance, Total=Total Assistance, NT=Not Tested    ACTIVITIES OF DAILY LIVING: I Mod I S SBA CGA Min Mod Max Total NT Comments   BASIC ADLs:              Upper Body Bathing  [] [] [] [] [] [] [] [] [] [x]    Lower Body Bathing [] [] [] [] [] [] [] [] [] [x]    Toileting [] [] [] [] [] [] [] [] [] [x]    Upper Body Dressing [] [] [] [] [] [] [] [] [] [x]    Lower Body Dressing [] [] [] [] [] [] [] [] [] [x]    Feeding [] [] [] [] [] [] [] [] [] [x]    Grooming [] [] [] [] [] [] [] [] [] [x]    Personal Device Care [] [] [] [] [] [] [] [] [] [x]    Functional Mobility [] [] [] [] [] [] [] [] [] [x]    I=Independent, Mod I=Modified Independent, S=Supervision/Setup, SBA=Standby Assistance, CGA=Contact Guard Assistance, Min=Minimal Assistance, Mod=Moderate Assistance, Max=Maximal Assistance, Total=Total Assistance, NT=Not Tested    PLAN:   [address] [place] [address] of Care: 3 times/week for duration of hospital stay or until stated goals are met, whichever comes first.    PROBLEM LIST:   (Skilled intervention is medically necessary to address:)  Decreased ADL/Functional Activities  Decreased Activity Tolerance  Decreased AROM/PROM  Decreased Balance  Decreased Cognition  Decreased Coordination  Decreased Gait Ability  Decreased Safety Awareness  Decreased Strength  Decreased Transfer Abilities  Increased Pain   INTERVENTIONS PLANNED:  (Benefits and precautions of occupational therapy have been discussed with the patient.)  Self Care Training  Therapeutic Activity  Therapeutic Exercise/HEP  Neuromuscular Re-education  Manual Therapy  Education         TREATMENT:     EVALUATION: MODERATE COMPLEXITY: (Untimed Charge)    TREATMENT:   Co-Treatment PT/OT necessary due to patient's decreased overall endurance/tolerance levels, as well as need for high level skilled assistance to complete functional transfers/mobility and functional tasks  Neuromuscular Re-education (23 Minutes): Neuromuscular Re-education included Balance Training, Coordination training, Postural training, and Sitting balance training to improve Balance, Coordination, and Postural Control.     TREATMENT GRID:  N/A    AFTER TREATMENT PRECAUTIONS: Bed, Bed/Chair Locked, Call light within reach, Chair, Heels floated, Needs within reach, and RN notified    INTERDISCIPLINARY COLLABORATION:  RN/ PCT, PT/ PTA, and OT/ LIMON    EDUCATION:  Education Given To: Patient  Education Provided: Role of Therapy  Education Method: Verbal  Education Outcome: Verbalized understanding;Continued education needed    TOTAL TREATMENT DURATION AND TIME:  Time In: 0957  Time Out: [address]  Minutes: Alexis Mg 3, OT

## 2022-12-14 NOTE — PROGRESS NOTES
VANCO DAILY FOLLOW UP NOTE  [facility] Pharmacokinetic Monitoring Service - Vancomycin    Consulting Provider: Dr. Cj Chew   Indication: SSTI  Target Concentration: Goal trough of 10-15 mg/L and AUC/WHITNEY <500 mg*hr/L  Day of Therapy: 2 of 7  Additional Antimicrobials: cefepime    Patient eligible for piperacillin-tazobactam to cefepime auto-substitution per P&T approved protocol? YES    Pertinent Laboratory Values: Wt Readings from Last 1 Encounters:   12/13/22 130 lb (59 kg)     Temp Readings from Last 1 Encounters:   12/14/22 97.6 °F (36.4 °C) (Oral)     Recent Labs     12/13/22  1457 12/14/22  0518   BUN 18 20   CREATININE 1.00 1.10   WBC 10.9 11.5*   PROCAL 0.09  --    LACACIDPL 1.8  --      Estimated Creatinine Clearance: 54 mL/min (based on SCr of 1.1 mg/dL). No results found for: Zeynep Szymanski    MRSA Nasal Swab: N/A.  Non-respiratory infection    Assessment:  Date/Time Dose Concentration AUC    1000 mg q18h     Note: Serum concentrations collected for AUC dosing may appear elevated if collected in close proximity to the dose administered, this is not necessarily an indication of toxicity    Plan:  Continue 1000 mg q18h  Repeat vancomycin concentrations will be ordered as clinically appropriate   Pharmacy will continue to monitor patient and adjust therapy as indicated    Thank you for the consult,  Evon Fisher, PharmD, BCOP  Clinical Pharmacist  Contact [employer]

## 2023-04-30 ENCOUNTER — HOSPITAL ENCOUNTER (EMERGENCY)
Age: 68
Discharge: HOME | End: 2023-05-01
Attending: GENERAL PRACTICE
Payer: MEDICARE

## 2023-04-30 DIAGNOSIS — I73.9 PERIPHERAL VASCULAR DISEASE (HCC): ICD-10-CM

## 2023-04-30 DIAGNOSIS — L03.115 CELLULITIS OF RIGHT LOWER EXTREMITY: Primary | ICD-10-CM

## 2023-04-30 LAB
ANION GAP SERPL CALC-SCNC: 4 MMOL/L (ref 2–11)
BASOPHILS # BLD: 0 K/UL (ref 0–0.2)
BASOPHILS NFR BLD: 0 % (ref 0–2)
BUN SERPL-MCNC: 12 MG/DL (ref 8–23)
CALCIUM SERPL-MCNC: 9.1 MG/DL (ref 8.3–10.4)
CHLORIDE SERPL-SCNC: 108 MMOL/L (ref 101–110)
CO2 SERPL-SCNC: 27 MMOL/L (ref 21–32)
CREAT SERPL-MCNC: 0.7 MG/DL (ref 0.8–1.5)
DIFFERENTIAL METHOD BLD: ABNORMAL
EOSINOPHIL # BLD: 0.3 K/UL (ref 0–0.8)
EOSINOPHIL NFR BLD: 3 % (ref 0.5–7.8)
ERYTHROCYTE [DISTWIDTH] IN BLOOD BY AUTOMATED COUNT: 14.1 % (ref 11.9–14.6)
GLUCOSE SERPL-MCNC: 90 MG/DL (ref 65–100)
HCT VFR BLD AUTO: 43.7 % (ref 41.1–50.3)
HGB BLD-MCNC: 13.7 G/DL (ref 13.6–17.2)
IMM GRANULOCYTES # BLD AUTO: 0 K/UL (ref 0–0.5)
IMM GRANULOCYTES NFR BLD AUTO: 0 % (ref 0–5)
LACTATE SERPL-SCNC: 1.5 MMOL/L (ref 0.4–2)
LYMPHOCYTES # BLD: 2.2 K/UL (ref 0.5–4.6)
LYMPHOCYTES NFR BLD: 19 % (ref 13–44)
MCH RBC QN AUTO: 28.6 PG (ref 26.1–32.9)
MCHC RBC AUTO-ENTMCNC: 31.4 G/DL (ref 31.4–35)
MCV RBC AUTO: 91.2 FL (ref 82–102)
MONOCYTES # BLD: 0.8 K/UL (ref 0.1–1.3)
MONOCYTES NFR BLD: 7 % (ref 4–12)
NEUTS SEG # BLD: 7.9 K/UL (ref 1.7–8.2)
NEUTS SEG NFR BLD: 71 % (ref 43–78)
NRBC # BLD: 0 K/UL (ref 0–0.2)
PLATELET # BLD AUTO: 436 K/UL (ref 150–450)
PMV BLD AUTO: 10.8 FL (ref 9.4–12.3)
POTASSIUM SERPL-SCNC: 4.2 MMOL/L (ref 3.5–5.1)
RBC # BLD AUTO: 4.79 M/UL (ref 4.23–5.6)
SODIUM SERPL-SCNC: 139 MMOL/L (ref 133–143)
WBC # BLD AUTO: 11.3 K/UL (ref 4.3–11.1)

## 2023-04-30 PROCEDURE — 6360000002 HC RX W HCPCS: Performed by: STUDENT IN AN ORGANIZED HEALTH CARE EDUCATION/TRAINING PROGRAM

## 2023-04-30 PROCEDURE — 96365 THER/PROPH/DIAG IV INF INIT: CPT

## 2023-04-30 PROCEDURE — 83605 ASSAY OF LACTIC ACID: CPT

## 2023-04-30 PROCEDURE — 99284 EMERGENCY DEPT VISIT MOD MDM: CPT

## 2023-04-30 PROCEDURE — 6370000000 HC RX 637 (ALT 250 FOR IP): Performed by: STUDENT IN AN ORGANIZED HEALTH CARE EDUCATION/TRAINING PROGRAM

## 2023-04-30 PROCEDURE — 85025 COMPLETE CBC W/AUTO DIFF WBC: CPT

## 2023-04-30 PROCEDURE — 2580000003 HC RX 258: Performed by: STUDENT IN AN ORGANIZED HEALTH CARE EDUCATION/TRAINING PROGRAM

## 2023-04-30 PROCEDURE — 80048 BASIC METABOLIC PNL TOTAL CA: CPT

## 2023-04-30 RX ORDER — 0.9 % SODIUM CHLORIDE 0.9 %
1000 INTRAVENOUS SOLUTION INTRAVENOUS ONCE
Status: COMPLETED | OUTPATIENT
Start: 2023-04-30 | End: 2023-05-01

## 2023-04-30 RX ORDER — OXYCODONE HYDROCHLORIDE 5 MG/1
5 TABLET ORAL
Status: COMPLETED | OUTPATIENT
Start: 2023-04-30 | End: 2023-04-30

## 2023-04-30 RX ADMIN — SODIUM CHLORIDE 1000 ML: 9 INJECTION, SOLUTION INTRAVENOUS at 23:04

## 2023-04-30 RX ADMIN — OXYCODONE 5 MG: 5 TABLET ORAL at 22:50

## 2023-04-30 RX ADMIN — VANCOMYCIN HYDROCHLORIDE 750 MG: 750 INJECTION, POWDER, LYOPHILIZED, FOR SOLUTION INTRAVENOUS at 23:07

## 2023-04-30 ASSESSMENT — PAIN - FUNCTIONAL ASSESSMENT: PAIN_FUNCTIONAL_ASSESSMENT: 0-10

## 2023-04-30 ASSESSMENT — PAIN DESCRIPTION - ORIENTATION: ORIENTATION: RIGHT;LEFT

## 2023-04-30 ASSESSMENT — PAIN SCALES - GENERAL: PAINLEVEL_OUTOF10: 9

## 2023-04-30 ASSESSMENT — PAIN DESCRIPTION - LOCATION: LOCATION: LEG

## 2023-05-01 VITALS
OXYGEN SATURATION: 92 % | SYSTOLIC BLOOD PRESSURE: 140 MMHG | HEART RATE: 79 BPM | HEIGHT: 63 IN | RESPIRATION RATE: 20 BRPM | TEMPERATURE: 98.1 F | DIASTOLIC BLOOD PRESSURE: 80 MMHG | BODY MASS INDEX: 20.55 KG/M2 | WEIGHT: 116 LBS

## 2023-05-01 RX ORDER — CEPHALEXIN 500 MG/1
500 CAPSULE ORAL 4 TIMES DAILY
Qty: 28 CAPSULE | Refills: 0 | Status: SHIPPED | OUTPATIENT
Start: 2023-05-01 | End: 2023-05-08

## 2023-05-01 RX ORDER — SULFAMETHOXAZOLE AND TRIMETHOPRIM 800; 160 MG/1; MG/1
1 TABLET ORAL 2 TIMES DAILY
Qty: 20 TABLET | Refills: 0 | Status: SHIPPED | OUTPATIENT
Start: 2023-05-01 | End: 2023-05-11

## 2023-05-01 NOTE — ED NOTES
I have reviewed discharge instructions with the patient. The patient verbalized understanding. Patient left ED via Discharge Method: stretcher to Home with [facility] transportation. Opportunity for questions and clarification provided. Patient given 2 scripts. To continue your aftercare when you leave the hospital, you may receive an automated call from our care team to check in on how you are doing. This is a free service and part of our promise to provide the best care and service to meet your aftercare needs.  If you have questions, or wish to unsubscribe from this service please call [phone]. Thank you for Choosing our [facility] Emergency Department.         Alysia Chacon RN  05/01/23 0127

## 2023-05-01 NOTE — ED PROVIDER NOTES
leg pain. Reports a 1 day history of pain to his right shin and dorsum of left foot. Has had prior reported iliac stent on the left via Dr. Madelin Solomon. Denies fevers, tingling. Has had abrasion to right shin recently but no other recent injuries    History     Past Medical History:   Diagnosis Date    Cellulitis     Chronic obstructive pulmonary disease (HCC)     Generalized weakness     Methamphetamine abuse (HCC)     Multiple sclerosis (HCC)     wheelchair bound; can't extend lower extremties    PVD (peripheral vascular disease) ([address]     has stent in groin    Sacral ulcer ([address]     Skin ulcers of foot, bilateral ([address]     chronic     Past Surgical History:   Procedure Laterality Date    VASCULAR SURGERY  03/07/2022    Ultrasound-guided access of the left common femoral artery with placement of a catheter in the aorta for aortogram.Bilateral lower extremity arteriograms. Family History   Problem Relation Age of Onset    Stroke Mother      No Known Allergies    Physical Exam     Vitals:    04/30/23 2233 05/01/23 0030   BP: (!) 163/91 (!) 140/80   Pulse: 89 79   Resp: 18 20   Temp: 98.1 °F (36.7 °C)    TempSrc: Oral    SpO2: 93% 92%   Weight: 116 lb (52.6 kg)    Height: 5' 3\" (1.6 m)      Nursing note and vitals reviewed. Constitutional: Well developed, NAD  HEENT: Atraumatic, conjugate gaze, EOM intact  Neck: Supple  Cardiovascular: No cyanosis, diaphoresis, or JVD appreciated. Respiratory: Effort normal. No respiratory distress. Gastrointestinal: Non-distended. No guarding or rebound. MSK: No deformities appreciated. No peripheral edema. Erythema and 2cm superficial abrasion wound to right tibia with overlying tenderness and mild warmth. Chronic appearing foot dorsum wounds with ulcers in various stages of healing. No drainage. Nonpalpable DP pulses however pulses are obtainable via Doppler  Skin: Skin is warm and dry. No rash appreciated.  Chronic appearing wounds to dorsum of feet bilaterally  Neuro:

## 2023-05-01 NOTE — CARE COORDINATION
Received note to refer patient to vascular. From chart review it appears that referral to vascular MD has already been made.

## 2023-05-01 NOTE — DISCHARGE INSTRUCTIONS
Be sure to take the antibiotics as directed. Follow-up with Dr. Nik Burleson clinic in the next 2 weeks.     [facility] Vascular Surgery  [facility] [address], [address] [facility]  [phone]

## 2023-05-16 ENCOUNTER — CLINICAL DOCUMENTATION (OUTPATIENT)
Dept: VASCULAR SURGERY | Age: 68
End: 2023-05-16

## 2023-06-19 ENCOUNTER — HOSPITAL ENCOUNTER (OUTPATIENT)
Dept: WOUND CARE | Age: 68
Discharge: HOME | End: 2023-06-19
Payer: MEDICARE

## 2023-06-19 VITALS
SYSTOLIC BLOOD PRESSURE: 133 MMHG | BODY MASS INDEX: 23.04 KG/M2 | TEMPERATURE: 97.6 F | HEIGHT: 63 IN | RESPIRATION RATE: 18 BRPM | OXYGEN SATURATION: 96 % | HEART RATE: 82 BPM | DIASTOLIC BLOOD PRESSURE: 74 MMHG | WEIGHT: 130 LBS

## 2023-06-19 DIAGNOSIS — L03.116 CELLULITIS OF LEFT FOOT: ICD-10-CM

## 2023-06-19 PROBLEM — I87.332 IDIOPATHIC CHRONIC VENOUS HYPERTENSION OF LEFT LOWER EXTREMITY WITH ULCER AND INFLAMMATION (HCC): Chronic | Status: ACTIVE | Noted: 2021-04-07

## 2023-06-19 PROBLEM — I87.331 IDIOPATHIC CHRONIC VENOUS HYPERTENSION OF RIGHT LOWER EXTREMITY WITH ULCER AND INFLAMMATION (HCC): Chronic | Status: ACTIVE | Noted: 2021-04-07

## 2023-06-19 PROBLEM — L97.519 SKIN ULCERS OF FOOT, BILATERAL (HCC): Chronic | Status: ACTIVE | Noted: 2022-12-13

## 2023-06-19 PROBLEM — I73.9 PAD (PERIPHERAL ARTERY DISEASE) (HCC): Chronic | Status: ACTIVE | Noted: 2022-01-04

## 2023-06-19 PROBLEM — L97.529 SKIN ULCERS OF FOOT, BILATERAL (HCC): Chronic | Status: ACTIVE | Noted: 2022-12-13

## 2023-06-19 PROCEDURE — 99203 OFFICE O/P NEW LOW 30 MIN: CPT

## 2023-06-19 PROCEDURE — 99214 OFFICE O/P EST MOD 30 MIN: CPT | Performed by: FAMILY MEDICINE

## 2023-06-19 RX ORDER — SULFAMETHOXAZOLE AND TRIMETHOPRIM 800; 160 MG/1; MG/1
1 TABLET ORAL 2 TIMES DAILY
Qty: 28 TABLET | Refills: 0 | Status: SHIPPED | OUTPATIENT
Start: 2023-06-19 | End: 2023-07-03

## 2023-06-19 RX ORDER — CEPHALEXIN 500 MG/1
500 CAPSULE ORAL 4 TIMES DAILY
Qty: 56 CAPSULE | Refills: 0 | Status: SHIPPED | OUTPATIENT
Start: 2023-06-19 | End: 2023-07-03

## 2023-06-19 NOTE — FLOWSHEET NOTE
06/19/23 1507   Right Leg Edema Point of Measurement   Leg circumference 28 cm   Ankle circumference 22 cm   Foot circumference 24 cm   Left Leg Edema Point of Measurement   Leg circumference 27.5 cm   Ankle circumference 20.5 cm   Foot circumference 24 cm   Wound 06/19/23 Foot Right;Dorsal;Lateral #1   Date First Assessed/Time First Assessed: 06/19/23 1508   Present on Hospital Admission: Yes  Wound Approximate Age at First Assessment (Weeks): 52 weeks  Primary Wound Type: Arterial Ulcer  Location: Foot  Wound Location Orientation: Right;Dorsal;Late. .. Wound Image    Wound Etiology Arterial   Dressing Status Old drainage noted   Wound Cleansed Cleansed with saline; Vashe   Dressing/Treatment ABD;Gauze dressing/dressing sponge   Wound Length (cm) 12 cm   Wound Width (cm) 1.5 cm   Wound Depth (cm) 0.1 cm   Wound Surface Area (cm^2) 18 cm^2   Wound Volume (cm^3) 1.8 cm^3   Wound Assessment Granulation tissue   Drainage Amount Small   Drainage Description Serosanguinous   Odor None   Laura-wound Assessment Warm;Blanchable erythema   Margins Attached edges   Wound Thickness Description not for Pressure Injury Full thickness   Wound 06/19/23 Foot Left;Dorsal #2   Date First Assessed/Time First Assessed: 06/19/23 1509   Present on Hospital Admission: Yes  Wound Approximate Age at First Assessment (Weeks): 52 weeks  Primary Wound Type: Arterial Ulcer  Location: Foot  Wound Location Orientation: Left;Dorsal  Woun. .. Wound Image    Wound Etiology Arterial   Dressing Status Old drainage noted   Wound Cleansed Cleansed with saline; Vashe   Dressing/Treatment ABD;Gauze dressing/dressing sponge   Wound Length (cm) 4 cm   Wound Width (cm) 6.5 cm   Wound Depth (cm) 0.1 cm   Wound Surface Area (cm^2) 26 cm^2   Wound Volume (cm^3) 2.6 cm^3   Wound Assessment Granulation tissue   Drainage Amount Small   Drainage Description Serosanguinous   Odor None   Laura-wound Assessment Cool;Non-blanchable erythema   Margins Attached edges

## 2023-06-19 NOTE — FLOWSHEET NOTE
06/19/23 1507   Right Leg Edema Point of Measurement   Leg circumference 28 cm   Ankle circumference 22 cm   Foot circumference 24 cm   Left Leg Edema Point of Measurement   Leg circumference 27.5 cm   Ankle circumference 20.5 cm   Foot circumference 24 cm   Wound 06/19/23 Foot Right;Dorsal;Lateral #1   Date First Assessed/Time First Assessed: 06/19/23 1508   Present on Hospital Admission: Yes  Wound Approximate Age at First Assessment (Weeks): 52 weeks  Primary Wound Type: Arterial Ulcer  Location: Foot  Wound Location Orientation: Right;Dorsal;Late. .. Wound Image    Wound Etiology Arterial   Dressing Status Old drainage noted   Wound Cleansed Cleansed with saline; Vashe   Dressing/Treatment ABD;Gauze dressing/dressing sponge   Offloading for Diabetic Foot Ulcers Offloading ordered   Wound Length (cm) 12 cm   Wound Width (cm) 1.5 cm   Wound Depth (cm) 0.1 cm   Wound Surface Area (cm^2) 18 cm^2   Wound Volume (cm^3) 1.8 cm^3   Wound Assessment Granulation tissue   Drainage Amount Small   Drainage Description Serosanguinous   Odor None   Laura-wound Assessment Non-blanchable erythema; Cool   Margins Attached edges   Wound Thickness Description not for Pressure Injury Full thickness   Wound 06/19/23 Foot Left;Dorsal #2   Date First Assessed/Time First Assessed: 06/19/23 1509   Present on Hospital Admission: Yes  Wound Approximate Age at First Assessment (Weeks): 52 weeks  Primary Wound Type: Arterial Ulcer  Location: Foot  Wound Location Orientation: Left;Dorsal  Woun. .. Wound Image    Wound Etiology Arterial   Dressing Status Old drainage noted   Wound Cleansed Cleansed with saline; Vashe   Dressing/Treatment ABD;Gauze dressing/dressing sponge   Offloading for Diabetic Foot Ulcers Offloading ordered   Wound Length (cm) 4 cm   Wound Width (cm) 6.5 cm   Wound Depth (cm) 0.1 cm   Wound Surface Area (cm^2) 26 cm^2   Wound Volume (cm^3) 2.6 cm^3   Wound Assessment Granulation tissue   Drainage Amount Small   Drainage

## 2023-06-19 NOTE — FLOWSHEET NOTE
06/19/23 1507   Right Leg Edema Point of Measurement   Leg circumference 28 cm   Ankle circumference 22 cm   Foot circumference 24 cm   Left Leg Edema Point of Measurement   Leg circumference 27.5 cm   Ankle circumference 20.5 cm   Foot circumference 24 cm   Wound 06/19/23 Foot Right;Dorsal;Lateral #1   Date First Assessed/Time First Assessed: 06/19/23 1508   Present on Hospital Admission: Yes  Wound Approximate Age at First Assessment (Weeks): 52 weeks  Primary Wound Type: Arterial Ulcer  Location: Foot  Wound Location Orientation: Right;Dorsal;Late. .. Wound Image    Wound Etiology Arterial   Dressing Status Old drainage noted   Wound Cleansed Cleansed with saline; Vashe   Dressing/Treatment ABD;Gauze dressing/dressing sponge   Wound Length (cm) 12 cm   Wound Width (cm) 1.5 cm   Wound Depth (cm) 0.1 cm   Wound Surface Area (cm^2) 18 cm^2   Wound Volume (cm^3) 1.8 cm^3   Wound Assessment Granulation tissue   Drainage Amount Small   Drainage Description Serosanguinous   Odor None   Laura-wound Assessment Cool;Non-blanchable erythema   Margins Attached edges   Wound Thickness Description not for Pressure Injury Full thickness   Wound 06/19/23 Foot Left;Dorsal #2   Date First Assessed/Time First Assessed: 06/19/23 1509   Present on Hospital Admission: Yes  Wound Approximate Age at First Assessment (Weeks): 52 weeks  Primary Wound Type: Arterial Ulcer  Location: Foot  Wound Location Orientation: Left;Dorsal  Woun. .. Wound Image    Wound Etiology Arterial   Dressing Status Old drainage noted   Wound Cleansed Cleansed with saline; Vashe   Dressing/Treatment ABD;Gauze dressing/dressing sponge   Wound Length (cm) 4 cm   Wound Width (cm) 6.5 cm   Wound Depth (cm) 0.1 cm   Wound Surface Area (cm^2) 26 cm^2   Wound Volume (cm^3) 2.6 cm^3   Wound Assessment Granulation tissue   Drainage Amount Small   Drainage Description Serosanguinous   Odor None   Laura-wound Assessment Warm;Blanchable erythema   Margins Attached edges

## 2023-06-19 NOTE — WOUND CARE
Discharge Instructions for  Tyron Negron  [address] [place] [address]  [address], [address]  Phone [phone]   Fax [phone]      NAME:  Phill Lancaster  YOB: 1955  MEDICAL RECORD NUMBER:  [id number]  DATE:  6/19/2023    Return Appointment:   1 week with Yuriy Bazzi DO      Instructions:   Bilateral Feet  Cleanse wound and periwound with wound cleanser or normal saline. Vashe moistened gauze to wound bed. Xeroform- apply to wound bed. Cover with ABD  Wrap with Kerlix. Dressing change 3x weekly. [facility], [address] for wound assesments and wound care between patient appointments at wound center. Keflex and Bactrim - obtain from preferred pharmacy    Consult with Dr. Werner Sorto. Should you experience increased redness, swelling, pain, foul odor, size of wound(s), or have a temperature over 101 degrees please contact the [address] [place] [address] Road at [phone] or if after hours contact your primary care physician or go to the hospital emergency department. PLEASE NOTE: IF YOU ARE UNABLE TO OBTAIN WOUND SUPPLIES, CONTINUE TO USE THE SUPPLIES YOU HAVE AVAILABLE UNTIL YOU ARE ABLE TO REACH US. IT IS MOST IMPORTANT TO KEEP THE WOUND COVERED AT ALL TIMES. Electronically signed Jane Huffman.  Red Homans, RN on 6/19/2023 at 3:38 PM

## 2023-06-20 NOTE — PROGRESS NOTES
[address] [facility]   History and Physical Note   Referring Provider: Brandi Gabriel DO  Reason for Referral: Bilateral foot ulcers with recent cellulitis and known peripheral vascular disease    Gerry Barajas RECORD NUMBER:  [id number]  AGE: 78 y.o. GENDER: male  : 1955  EPISODE DATE:  2023    Chief complaint and reason for visit:     Chief Complaint   Patient presents with    New Patient     Bilateral Feet         HISTORY of PRESENT ILLNESS HPI     Rita Rees is a 78 y.o. male who presents today for an initial evaluation of a wound/ulcer. Patient is returning to the wound center for a recurrent or new wound. Wound duration:  3 month(s). History of Wound Context: Patient comes in today with family. Was recently treated in the ER for cellulitis antibiotics from the foot. He finished antibiotics in 1 day. Redness has improved but still present. Patient has known peripheral vascular disease treated by Dr. Bean Hubbard approximately 3 years ago. Patient comes in today with pain in both feet. He is somewhat contracted due to MS. He has wounds dorsal aspect of both feet. Pertinent associated symptoms: redness, swelling, and skin discoloration    PAST MEDICAL HISTORY        Diagnosis Date    Cellulitis     Chronic obstructive pulmonary disease (HCC)     Generalized weakness     Methamphetamine abuse (HCC)     Multiple sclerosis (HCC)     wheelchair bound; can't extend lower extremties    PVD (peripheral vascular disease) ([address]     has stent in groin    Sacral ulcer ([address]     Skin ulcers of foot, bilateral ([address]     chronic       PAST SURGICAL HISTORY  Past Surgical History:   Procedure Laterality Date    VASCULAR SURGERY  2022    Ultrasound-guided access of the left common femoral artery with placement of a catheter in the aorta for aortogram.Bilateral lower extremity arteriograms.        FAMILY HISTORY  Family History   Problem Relation Age

## 2023-06-25 RX ORDER — LIDOCAINE HYDROCHLORIDE 40 MG/ML
SOLUTION TOPICAL ONCE
OUTPATIENT
Start: 2023-06-25 | End: 2023-06-25

## 2023-06-25 RX ORDER — LIDOCAINE 50 MG/G
OINTMENT TOPICAL ONCE
OUTPATIENT
Start: 2023-06-25 | End: 2023-06-25

## 2023-06-25 RX ORDER — LIDOCAINE HYDROCHLORIDE 20 MG/ML
JELLY TOPICAL ONCE
OUTPATIENT
Start: 2023-06-25 | End: 2023-06-25

## 2023-06-25 RX ORDER — CLOBETASOL PROPIONATE 0.5 MG/G
OINTMENT TOPICAL ONCE
OUTPATIENT
Start: 2023-06-25 | End: 2023-06-25

## 2023-06-25 RX ORDER — GINSENG 100 MG
CAPSULE ORAL ONCE
OUTPATIENT
Start: 2023-06-25 | End: 2023-06-25

## 2023-06-25 RX ORDER — BETAMETHASONE DIPROPIONATE 0.05 %
OINTMENT (GRAM) TOPICAL ONCE
OUTPATIENT
Start: 2023-06-25 | End: 2023-06-25

## 2023-06-25 RX ORDER — GENTAMICIN SULFATE 1 MG/G
OINTMENT TOPICAL ONCE
OUTPATIENT
Start: 2023-06-25 | End: 2023-06-25

## 2023-06-25 RX ORDER — BACITRACIN ZINC AND POLYMYXIN B SULFATE 500; 1000 [USP'U]/G; [USP'U]/G
OINTMENT TOPICAL ONCE
OUTPATIENT
Start: 2023-06-25 | End: 2023-06-25

## 2023-06-25 RX ORDER — LIDOCAINE 40 MG/G
CREAM TOPICAL ONCE
OUTPATIENT
Start: 2023-06-25 | End: 2023-06-25

## 2023-06-25 RX ORDER — SODIUM CHLOR/HYPOCHLOROUS ACID 0.033 %
SOLUTION, IRRIGATION IRRIGATION ONCE
OUTPATIENT
Start: 2023-06-25 | End: 2023-06-25

## 2023-06-25 RX ORDER — IBUPROFEN 200 MG
TABLET ORAL ONCE
OUTPATIENT
Start: 2023-06-25 | End: 2023-06-25

## 2023-06-26 ENCOUNTER — HOSPITAL ENCOUNTER (OUTPATIENT)
Dept: WOUND CARE | Age: 68
Discharge: HOME | End: 2023-06-26
Payer: MEDICARE

## 2023-06-26 VITALS
DIASTOLIC BLOOD PRESSURE: 74 MMHG | BODY MASS INDEX: 23.04 KG/M2 | WEIGHT: 130 LBS | SYSTOLIC BLOOD PRESSURE: 132 MMHG | TEMPERATURE: 98.6 F | HEIGHT: 63 IN | HEART RATE: 66 BPM

## 2023-06-26 DIAGNOSIS — L03.116 CELLULITIS OF LEFT FOOT: Primary | ICD-10-CM

## 2023-06-26 PROCEDURE — 11042 DBRDMT SUBQ TIS 1ST 20SQCM/<: CPT

## 2023-06-26 RX ORDER — LIDOCAINE HYDROCHLORIDE 20 MG/ML
JELLY TOPICAL ONCE
OUTPATIENT
Start: 2023-06-26 | End: 2023-06-26

## 2023-06-26 RX ORDER — SODIUM CHLOR/HYPOCHLOROUS ACID 0.033 %
SOLUTION, IRRIGATION IRRIGATION ONCE
OUTPATIENT
Start: 2023-06-26 | End: 2023-06-26

## 2023-06-26 RX ORDER — LIDOCAINE HYDROCHLORIDE 40 MG/ML
SOLUTION TOPICAL ONCE
OUTPATIENT
Start: 2023-06-26 | End: 2023-06-26

## 2023-06-26 RX ORDER — GINSENG 100 MG
CAPSULE ORAL ONCE
OUTPATIENT
Start: 2023-06-26 | End: 2023-06-26

## 2023-06-26 RX ORDER — LIDOCAINE 40 MG/G
CREAM TOPICAL ONCE
OUTPATIENT
Start: 2023-06-26 | End: 2023-06-26

## 2023-06-26 RX ORDER — BACITRACIN ZINC AND POLYMYXIN B SULFATE 500; 1000 [USP'U]/G; [USP'U]/G
OINTMENT TOPICAL ONCE
OUTPATIENT
Start: 2023-06-26 | End: 2023-06-26

## 2023-06-26 RX ORDER — BETAMETHASONE DIPROPIONATE 0.05 %
OINTMENT (GRAM) TOPICAL ONCE
OUTPATIENT
Start: 2023-06-26 | End: 2023-06-26

## 2023-06-26 RX ORDER — LIDOCAINE 50 MG/G
OINTMENT TOPICAL ONCE
OUTPATIENT
Start: 2023-06-26 | End: 2023-06-26

## 2023-06-26 RX ORDER — LIDOCAINE HYDROCHLORIDE 20 MG/ML
JELLY TOPICAL ONCE
Status: COMPLETED | OUTPATIENT
Start: 2023-06-26 | End: 2023-06-26

## 2023-06-26 RX ORDER — GENTAMICIN SULFATE 1 MG/G
OINTMENT TOPICAL ONCE
OUTPATIENT
Start: 2023-06-26 | End: 2023-06-26

## 2023-06-26 RX ORDER — IBUPROFEN 200 MG
TABLET ORAL ONCE
OUTPATIENT
Start: 2023-06-26 | End: 2023-06-26

## 2023-06-26 RX ORDER — CLOBETASOL PROPIONATE 0.5 MG/G
OINTMENT TOPICAL ONCE
OUTPATIENT
Start: 2023-06-26 | End: 2023-06-26

## 2023-06-26 RX ADMIN — LIDOCAINE HYDROCHLORIDE: 20 JELLY TOPICAL at 15:08

## 2023-06-29 PROBLEM — G35 MS (MULTIPLE SCLEROSIS) (HCC): Chronic | Status: ACTIVE | Noted: 2022-01-04

## 2023-07-19 ENCOUNTER — APPOINTMENT (OUTPATIENT)
Dept: CT IMAGING | Age: 68
End: 2023-07-19
Payer: MEDICARE

## 2023-07-19 ENCOUNTER — APPOINTMENT (OUTPATIENT)
Dept: GENERAL RADIOLOGY | Age: 68
End: 2023-07-19
Payer: MEDICARE

## 2023-07-19 ENCOUNTER — HOSPITAL ENCOUNTER (EMERGENCY)
Age: 68
Discharge: HOME | End: 2023-07-19
Attending: EMERGENCY MEDICINE
Payer: MEDICARE

## 2023-07-19 VITALS
HEIGHT: 63 IN | WEIGHT: 130 LBS | DIASTOLIC BLOOD PRESSURE: 70 MMHG | OXYGEN SATURATION: 97 % | BODY MASS INDEX: 23.04 KG/M2 | SYSTOLIC BLOOD PRESSURE: 132 MMHG | TEMPERATURE: 96.9 F | RESPIRATION RATE: 14 BRPM | HEART RATE: 77 BPM
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
## 2023-07-19 DIAGNOSIS — M24.559: ICD-10-CM

## 2023-07-19 LAB
ANION GAP SERPL CALC-SCNC: 6 MMOL/L (ref 2–11)
BUN SERPL-MCNC: 13 MG/DL (ref 8–23)
CALCIUM SERPL-MCNC: 9.3 MG/DL (ref 8.3–10.4)
CHLORIDE SERPL-SCNC: 108 MMOL/L (ref 101–110)
CK SERPL-CCNC: 196 U/L (ref 21–215)
CO2 SERPL-SCNC: 24 MMOL/L (ref 21–32)
CREAT SERPL-MCNC: 0.8 MG/DL (ref 0.8–1.5)
EKG ATRIAL RATE: 77 BPM
EKG DIAGNOSIS: NORMAL
EKG P AXIS: 84 DEGREES
EKG P-R INTERVAL: 199 MS
EKG Q-T INTERVAL: 411 MS
EKG QRS DURATION: 109 MS
EKG QTC CALCULATION (BAZETT): 469 MS
EKG R AXIS: 98 DEGREES
EKG T AXIS: 82 DEGREES
EKG VENTRICULAR RATE: 78 BPM
ERYTHROCYTE [DISTWIDTH] IN BLOOD BY AUTOMATED COUNT: 14.7 % (ref 11.9–14.6)
GLUCOSE SERPL-MCNC: 98 MG/DL (ref 65–100)
HCT VFR BLD AUTO: 42.6 % (ref 41.1–50.3)
HGB BLD-MCNC: 13.1 G/DL (ref 13.6–17.2)
LACTATE SERPL-SCNC: 2.5 MMOL/L (ref 0.4–2)
MCH RBC QN AUTO: 28.2 PG (ref 26.1–32.9)
MCHC RBC AUTO-ENTMCNC: 30.8 G/DL (ref 31.4–35)
MCV RBC AUTO: 91.6 FL (ref 82–102)
NRBC # BLD: 0 K/UL (ref 0–0.2)
PLATELET # BLD AUTO: 470 K/UL (ref 150–450)
PMV BLD AUTO: 10.6 FL (ref 9.4–12.3)
POTASSIUM SERPL-SCNC: 4.7 MMOL/L (ref 3.5–5.1)
RBC # BLD AUTO: 4.65 M/UL (ref 4.23–5.6)
SODIUM SERPL-SCNC: 138 MMOL/L (ref 133–143)
WBC # BLD AUTO: 11.2 K/UL (ref 4.3–11.1)

## 2023-07-19 PROCEDURE — 93005 ELECTROCARDIOGRAM TRACING: CPT | Performed by: EMERGENCY MEDICINE

## 2023-07-19 PROCEDURE — 6360000002 HC RX W HCPCS: Performed by: EMERGENCY MEDICINE

## 2023-07-19 PROCEDURE — 93010 ELECTROCARDIOGRAM REPORT: CPT | Performed by: INTERNAL MEDICINE

## 2023-07-19 PROCEDURE — 73552 X-RAY EXAM OF FEMUR 2/>: CPT

## 2023-07-19 PROCEDURE — 99285 EMERGENCY DEPT VISIT HI MDM: CPT

## 2023-07-19 PROCEDURE — 83605 ASSAY OF LACTIC ACID: CPT

## 2023-07-19 PROCEDURE — 72192 CT PELVIS W/O DYE: CPT

## 2023-07-19 PROCEDURE — 82550 ASSAY OF CK (CPK): CPT

## 2023-07-19 PROCEDURE — 96374 THER/PROPH/DIAG INJ IV PUSH: CPT

## 2023-07-19 PROCEDURE — 85027 COMPLETE CBC AUTOMATED: CPT

## 2023-07-19 PROCEDURE — 80048 BASIC METABOLIC PNL TOTAL CA: CPT

## 2023-07-19 RX ORDER — MORPHINE SULFATE 4 MG/ML
4 INJECTION INTRAVENOUS ONCE
Status: COMPLETED | OUTPATIENT
Start: 2023-07-19 | End: 2023-07-19

## 2023-07-19 RX ADMIN — MORPHINE SULFATE 4 MG: 4 INJECTION INTRAVENOUS at 13:23

## 2023-07-19 ASSESSMENT — PAIN - FUNCTIONAL ASSESSMENT: PAIN_FUNCTIONAL_ASSESSMENT: 0-10

## 2023-07-19 ASSESSMENT — PAIN SCALES - GENERAL
PAINLEVEL_OUTOF10: 7
PAINLEVEL_OUTOF10: 7

## 2023-07-19 ASSESSMENT — PAIN DESCRIPTION - LOCATION
LOCATION: HIP
LOCATION: HIP

## 2023-07-19 NOTE — ED TRIAGE NOTES
Pt arrives via EMS with Right hip pain and right leg pain. States pt fell out of bed last night and was not able to get up. Pt states does not know what time this happened. [facility] nurse found him in the floor of his home when she arrived this morning and called out. VS stable with EMS. Pt incontinent of urine on arrival. Pt states ambulates at home with wheelchair. Pt feet and ankles wrapped, states wounds on feet and ankles.

## 2023-07-19 NOTE — ED NOTES
April called to transport patient back to residence; ETA 90 minutes.      Priyanka Baker  07/19/23 6148

## 2023-07-19 NOTE — ED PROVIDER NOTES
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 Social History     Socioeconomic History    Marital status: Single   Tobacco Use    Smoking status: Former     Types: Cigarettes     Quit date: 2020     Years since quittin.6    Smokeless tobacco: Never    Tobacco comments:     Quit smoking: continues non-smoking status   Substance and Sexual Activity    Alcohol use: Never    Drug use: Not Currently        Previous Medications    ASPIRIN 325 MG TABLET    Take 325 mg by mouth daily    FAMOTIDINE (PEPCID) 20 MG TABLET    Take 20 mg by mouth 2 times daily    ZINC GLUCONATE 50 MG TABLET    Take 50 mg by mouth daily        Results for orders placed or performed during the hospital encounter of 23   XR FEMUR RIGHT (MIN 2 VIEWS)    Narrative    EXAMINATION: Right Femur    HISTORY: Pain after a fall. TECHNIQUE: Two views of the right femur. COMPARISON: None available. FINDINGS:   There is no evidence of acute fracture or dislocation. Joint spaces are maintained. Soft tissues are within normal limits. No radiopaque foreign body. Impression    No convincing evidence of acute fracture or dislocation within the right femur.      Lactic Acid   Result Value Ref Range

## 2023-07-19 NOTE — CARE COORDINATION
Chart review complete, CM met with pt at bedside, pt found laying on stretcher alert and oriented x4, pt states he lives in a handicap apartment with some friends who assist with care when needed, pt states he is current with [facility] and his PT/OT staff found his this morning when he fell out of his bed, per pt is normally in a WC and just received a new motorized wc this week. Demographics, insurance and PCP confirmed. CM staff will remain available to assist as needed with dc needs. 07/19/23 1216   Service Assessment   Patient Orientation Alert and Oriented   Cognition Alert   History Provided By Patient   Primary Caregiver Friend  (pt states he is independent but has room mates that help as needed with care when asked.)   Jack is: Legal Next of [address] [place] [address]   PCP Verified by CM Yes  ([address])   Last Visit to PCP Within last 3 months   Prior Functional Level Independent in ADLs/IADLs   Current Functional Level Independent in ADLs/IADLs   Can patient return to prior living arrangement Yes   Ability to make needs known: Good   Family able to assist with home care needs: Yes   Would you like for me to discuss the discharge plan with any other family members/significant others, and if so, who?  Yes   Financial Resources Medicare;Medicaid   Community Resources ECF/Home Care   CM/SW Referral Other (see comment)  (none)   Social/Functional History   Lives With Friend(s)   Type of Home Apartment   Home Layout One level   Home Access Level entry   Bathroom Shower/Tub Walk-in shower   Bathroom Toilet Handicap height   Bathroom Equipment Grab bars in shower   [address] [facility] Help From Friend(s)   ADL Assistance Independent   Ambulation Assistance Non-ambulatory   Transfer Assistance Independent   Active  No   Patient's  Info friends   Occupation On disability

## 2023-07-19 NOTE — DISCHARGE INSTRUCTIONS
Continue working with wound care and physical therapy at home. No changes in your medication regimen. If you develop any new or concerning symptoms return to the ER at that time.

## 2023-07-19 NOTE — ACP (ADVANCE CARE PLANNING)
Advance Care Planning   Healthcare Decision Maker:    Primary Decision Maker: Josefa Gregory - Brother/Sister - [phone]    Click here to complete Healthcare Decision Makers including selection of the Healthcare Decision Maker Relationship (ie \"Primary\"). Today we documented Decision Maker(s) consistent with Legal Next of Kin hierarchy.

## 2023-07-19 NOTE — ED NOTES
Blood Culture drawn and 2nd staff member assisted (audited) Eddie Crandall (nursing student)      Carmita Almendarez, RN  07/19/23 1900

## 2023-08-06 ENCOUNTER — APPOINTMENT (OUTPATIENT)
Dept: GENERAL RADIOLOGY | Age: 68
End: 2023-08-06
Payer: MEDICARE

## 2023-08-06 ENCOUNTER — HOSPITAL ENCOUNTER (OUTPATIENT)
Age: 68
Setting detail: OBSERVATION
LOS: 4 days | Discharge: NURSING, REHAB OR LONG-TERM CARE | End: 2023-08-11
Attending: EMERGENCY MEDICINE | Admitting: INTERNAL MEDICINE
Payer: MEDICARE

## 2023-08-06 ENCOUNTER — APPOINTMENT (OUTPATIENT)
Dept: CT IMAGING | Age: 68
End: 2023-08-06
Payer: MEDICARE

## 2023-08-06 DIAGNOSIS — G35 MS (MULTIPLE SCLEROSIS) (HCC): ICD-10-CM

## 2023-08-06 DIAGNOSIS — L03.317 CELLULITIS OF GLUTEAL REGION: ICD-10-CM

## 2023-08-06 DIAGNOSIS — L98.429 SKIN ULCER OF SACRUM, UNSPECIFIED ULCER STAGE (HCC): ICD-10-CM

## 2023-08-06 DIAGNOSIS — L03.90 CELLULITIS OF SKIN: Primary | ICD-10-CM

## 2023-08-06 PROBLEM — E43 SEVERE PROTEIN-CALORIE MALNUTRITION (HCC): Status: ACTIVE | Noted: 2023-08-06

## 2023-08-06 PROBLEM — L89.90 DECUBITUS ULCER: Status: ACTIVE | Noted: 2023-08-06

## 2023-08-06 PROBLEM — R79.89 ELEVATED LACTIC ACID LEVEL: Status: ACTIVE | Noted: 2023-08-06

## 2023-08-06 PROBLEM — E86.0 DEHYDRATION: Status: ACTIVE | Noted: 2023-08-06

## 2023-08-06 PROBLEM — R53.2 FUNCTIONAL QUADRIPLEGIA (HCC): Status: ACTIVE | Noted: 2023-08-06

## 2023-08-06 LAB
ALBUMIN SERPL-MCNC: 2.6 G/DL (ref 3.2–4.6)
ALBUMIN/GLOB SERPL: 0.6 (ref 0.4–1.6)
ALP SERPL-CCNC: 122 U/L (ref 50–136)
ALT SERPL-CCNC: 27 U/L (ref 12–65)
ANION GAP SERPL CALC-SCNC: 5 MMOL/L (ref 2–11)
APPEARANCE UR: CLEAR
AST SERPL-CCNC: 20 U/L (ref 15–37)
BASOPHILS # BLD: 0 K/UL (ref 0–0.2)
BASOPHILS NFR BLD: 0 % (ref 0–2)
BILIRUB SERPL-MCNC: 0.3 MG/DL (ref 0.2–1.1)
BILIRUB UR QL: NEGATIVE
BUN SERPL-MCNC: 18 MG/DL (ref 8–23)
CALCIUM SERPL-MCNC: 8.9 MG/DL (ref 8.3–10.4)
CHLORIDE SERPL-SCNC: 106 MMOL/L (ref 101–110)
CO2 SERPL-SCNC: 29 MMOL/L (ref 21–32)
COLOR UR: ABNORMAL
CREAT SERPL-MCNC: 0.9 MG/DL (ref 0.8–1.5)
DIFFERENTIAL METHOD BLD: ABNORMAL
EKG ATRIAL RATE: 96 BPM
EKG DIAGNOSIS: NORMAL
EKG P AXIS: 82 DEGREES
EKG P-R INTERVAL: 168 MS
EKG Q-T INTERVAL: 348 MS
EKG QRS DURATION: 101 MS
EKG QTC CALCULATION (BAZETT): 440 MS
EKG R AXIS: 99 DEGREES
EKG T AXIS: 85 DEGREES
EKG VENTRICULAR RATE: 96 BPM
EOSINOPHIL # BLD: 0.4 K/UL (ref 0–0.8)
EOSINOPHIL NFR BLD: 3 % (ref 0.5–7.8)
ERYTHROCYTE [DISTWIDTH] IN BLOOD BY AUTOMATED COUNT: 15.2 % (ref 11.9–14.6)
GLOBULIN SER CALC-MCNC: 4.5 G/DL (ref 2.8–4.5)
GLUCOSE SERPL-MCNC: 115 MG/DL (ref 65–100)
GLUCOSE UR STRIP.AUTO-MCNC: 250 MG/DL
HCT VFR BLD AUTO: 40.4 % (ref 41.1–50.3)
HGB BLD-MCNC: 12.4 G/DL (ref 13.6–17.2)
HGB UR QL STRIP: NEGATIVE
IMM GRANULOCYTES # BLD AUTO: 0.1 K/UL (ref 0–0.5)
IMM GRANULOCYTES NFR BLD AUTO: 0 % (ref 0–5)
KETONES UR QL STRIP.AUTO: NEGATIVE MG/DL
LACTATE SERPL-SCNC: 1.6 MMOL/L (ref 0.4–2)
LACTATE SERPL-SCNC: 2.5 MMOL/L (ref 0.4–2)
LACTATE SERPL-SCNC: 2.8 MMOL/L (ref 0.4–2)
LEUKOCYTE ESTERASE UR QL STRIP.AUTO: NEGATIVE
LYMPHOCYTES # BLD: 2 K/UL (ref 0.5–4.6)
LYMPHOCYTES NFR BLD: 17 % (ref 13–44)
MCH RBC QN AUTO: 28.1 PG (ref 26.1–32.9)
MCHC RBC AUTO-ENTMCNC: 30.7 G/DL (ref 31.4–35)
MCV RBC AUTO: 91.4 FL (ref 82–102)
MONOCYTES # BLD: 1.3 K/UL (ref 0.1–1.3)
MONOCYTES NFR BLD: 11 % (ref 4–12)
NEUTS SEG # BLD: 7.9 K/UL (ref 1.7–8.2)
NEUTS SEG NFR BLD: 68 % (ref 43–78)
NITRITE UR QL STRIP.AUTO: NEGATIVE
NRBC # BLD: 0 K/UL (ref 0–0.2)
PH UR STRIP: 6 (ref 5–9)
PLATELET # BLD AUTO: 428 K/UL (ref 150–450)
PMV BLD AUTO: 10.9 FL (ref 9.4–12.3)
POTASSIUM SERPL-SCNC: 4.2 MMOL/L (ref 3.5–5.1)
PROCALCITONIN SERPL-MCNC: 0.53 NG/ML (ref 0–0.49)
PROT SERPL-MCNC: 7.1 G/DL (ref 6.3–8.2)
PROT UR STRIP-MCNC: NEGATIVE MG/DL
RBC # BLD AUTO: 4.42 M/UL (ref 4.23–5.6)
SODIUM SERPL-SCNC: 140 MMOL/L (ref 133–143)
SP GR UR REFRACTOMETRY: >1.035 (ref 1–1.02)
UROBILINOGEN UR QL STRIP.AUTO: 1 EU/DL (ref 0.2–1)
WBC # BLD AUTO: 11.6 K/UL (ref 4.3–11.1)

## 2023-08-06 PROCEDURE — 6360000002 HC RX W HCPCS: Performed by: INTERNAL MEDICINE

## 2023-08-06 PROCEDURE — 2580000003 HC RX 258: Performed by: EMERGENCY MEDICINE

## 2023-08-06 PROCEDURE — 1100000000 HC RM PRIVATE

## 2023-08-06 PROCEDURE — 72193 CT PELVIS W/DYE: CPT

## 2023-08-06 PROCEDURE — 80053 COMPREHEN METABOLIC PANEL: CPT

## 2023-08-06 PROCEDURE — 93005 ELECTROCARDIOGRAM TRACING: CPT | Performed by: EMERGENCY MEDICINE

## 2023-08-06 PROCEDURE — 2500000003 HC RX 250 WO HCPCS: Performed by: INTERNAL MEDICINE

## 2023-08-06 PROCEDURE — 81003 URINALYSIS AUTO W/O SCOPE: CPT

## 2023-08-06 PROCEDURE — 83605 ASSAY OF LACTIC ACID: CPT

## 2023-08-06 PROCEDURE — 36415 COLL VENOUS BLD VENIPUNCTURE: CPT

## 2023-08-06 PROCEDURE — 2580000003 HC RX 258: Performed by: HOSPITALIST

## 2023-08-06 PROCEDURE — 93010 ELECTROCARDIOGRAM REPORT: CPT | Performed by: INTERNAL MEDICINE

## 2023-08-06 PROCEDURE — 99285 EMERGENCY DEPT VISIT HI MDM: CPT

## 2023-08-06 PROCEDURE — 6360000002 HC RX W HCPCS: Performed by: HOSPITALIST

## 2023-08-06 PROCEDURE — 2580000003 HC RX 258: Performed by: INTERNAL MEDICINE

## 2023-08-06 PROCEDURE — 96375 TX/PRO/DX INJ NEW DRUG ADDON: CPT

## 2023-08-06 PROCEDURE — 6370000000 HC RX 637 (ALT 250 FOR IP): Performed by: HOSPITALIST

## 2023-08-06 PROCEDURE — 51798 US URINE CAPACITY MEASURE: CPT

## 2023-08-06 PROCEDURE — 71045 X-RAY EXAM CHEST 1 VIEW: CPT

## 2023-08-06 PROCEDURE — 96367 TX/PROPH/DG ADDL SEQ IV INF: CPT

## 2023-08-06 PROCEDURE — 96372 THER/PROPH/DIAG INJ SC/IM: CPT

## 2023-08-06 PROCEDURE — 6360000002 HC RX W HCPCS: Performed by: EMERGENCY MEDICINE

## 2023-08-06 PROCEDURE — 84145 PROCALCITONIN (PCT): CPT

## 2023-08-06 PROCEDURE — 85025 COMPLETE CBC W/AUTO DIFF WBC: CPT

## 2023-08-06 PROCEDURE — 96365 THER/PROPH/DIAG IV INF INIT: CPT

## 2023-08-06 PROCEDURE — 87040 BLOOD CULTURE FOR BACTERIA: CPT

## 2023-08-06 PROCEDURE — 96366 THER/PROPH/DIAG IV INF ADDON: CPT

## 2023-08-06 PROCEDURE — 6360000004 HC RX CONTRAST MEDICATION: Performed by: EMERGENCY MEDICINE

## 2023-08-06 RX ORDER — ACETAMINOPHEN 650 MG/1
650 SUPPOSITORY RECTAL EVERY 6 HOURS PRN
Status: DISCONTINUED | OUTPATIENT
Start: 2023-08-06 | End: 2023-08-11 | Stop reason: HOSPADM

## 2023-08-06 RX ORDER — HYDROMORPHONE HYDROCHLORIDE 1 MG/ML
0.25 INJECTION, SOLUTION INTRAMUSCULAR; INTRAVENOUS; SUBCUTANEOUS EVERY 4 HOURS PRN
Status: DISCONTINUED | OUTPATIENT
Start: 2023-08-06 | End: 2023-08-11 | Stop reason: HOSPADM

## 2023-08-06 RX ORDER — SODIUM CHLORIDE 0.9 % (FLUSH) 0.9 %
5-40 SYRINGE (ML) INJECTION PRN
Status: DISCONTINUED | OUTPATIENT
Start: 2023-08-06 | End: 2023-08-11 | Stop reason: HOSPADM

## 2023-08-06 RX ORDER — ZINC GLUCONATE 50 MG
50 TABLET ORAL DAILY
Status: DISCONTINUED | OUTPATIENT
Start: 2023-08-06 | End: 2023-08-06 | Stop reason: RX

## 2023-08-06 RX ORDER — ACETAMINOPHEN 325 MG/1
650 TABLET ORAL EVERY 6 HOURS PRN
Status: DISCONTINUED | OUTPATIENT
Start: 2023-08-06 | End: 2023-08-11 | Stop reason: HOSPADM

## 2023-08-06 RX ORDER — SODIUM CHLORIDE 0.9 % (FLUSH) 0.9 %
5-40 SYRINGE (ML) INJECTION EVERY 12 HOURS SCHEDULED
Status: DISCONTINUED | OUTPATIENT
Start: 2023-08-06 | End: 2023-08-11 | Stop reason: HOSPADM

## 2023-08-06 RX ORDER — MORPHINE SULFATE 4 MG/ML
4 INJECTION INTRAVENOUS ONCE
Status: COMPLETED | OUTPATIENT
Start: 2023-08-06 | End: 2023-08-06

## 2023-08-06 RX ORDER — SODIUM CHLORIDE, SODIUM LACTATE, POTASSIUM CHLORIDE, CALCIUM CHLORIDE 600; 310; 30; 20 MG/100ML; MG/100ML; MG/100ML; MG/100ML
INJECTION, SOLUTION INTRAVENOUS CONTINUOUS
Status: DISCONTINUED | OUTPATIENT
Start: 2023-08-06 | End: 2023-08-06

## 2023-08-06 RX ORDER — KETOROLAC TROMETHAMINE 30 MG/ML
15 INJECTION, SOLUTION INTRAMUSCULAR; INTRAVENOUS EVERY 6 HOURS PRN
Status: DISCONTINUED | OUTPATIENT
Start: 2023-08-06 | End: 2023-08-11

## 2023-08-06 RX ORDER — POLYETHYLENE GLYCOL 3350 17 G/17G
17 POWDER, FOR SOLUTION ORAL DAILY PRN
Status: DISCONTINUED | OUTPATIENT
Start: 2023-08-06 | End: 2023-08-11 | Stop reason: HOSPADM

## 2023-08-06 RX ORDER — ONDANSETRON 2 MG/ML
4 INJECTION INTRAMUSCULAR; INTRAVENOUS EVERY 6 HOURS PRN
Status: DISCONTINUED | OUTPATIENT
Start: 2023-08-06 | End: 2023-08-11 | Stop reason: HOSPADM

## 2023-08-06 RX ORDER — 0.9 % SODIUM CHLORIDE 0.9 %
30 INTRAVENOUS SOLUTION INTRAVENOUS
Status: COMPLETED | OUTPATIENT
Start: 2023-08-06 | End: 2023-08-06

## 2023-08-06 RX ORDER — POTASSIUM CHLORIDE 7.45 MG/ML
10 INJECTION INTRAVENOUS PRN
Status: DISCONTINUED | OUTPATIENT
Start: 2023-08-06 | End: 2023-08-11 | Stop reason: HOSPADM

## 2023-08-06 RX ORDER — ONDANSETRON 4 MG/1
4 TABLET, ORALLY DISINTEGRATING ORAL EVERY 8 HOURS PRN
Status: DISCONTINUED | OUTPATIENT
Start: 2023-08-06 | End: 2023-08-11 | Stop reason: HOSPADM

## 2023-08-06 RX ORDER — MAGNESIUM HYDROXIDE/ALUMINUM HYDROXICE/SIMETHICONE 120; 1200; 1200 MG/30ML; MG/30ML; MG/30ML
30 SUSPENSION ORAL EVERY 6 HOURS PRN
Status: DISCONTINUED | OUTPATIENT
Start: 2023-08-06 | End: 2023-08-11 | Stop reason: HOSPADM

## 2023-08-06 RX ORDER — ENOXAPARIN SODIUM 100 MG/ML
40 INJECTION SUBCUTANEOUS DAILY
Status: DISCONTINUED | OUTPATIENT
Start: 2023-08-06 | End: 2023-08-11 | Stop reason: HOSPADM

## 2023-08-06 RX ORDER — SODIUM CHLORIDE 9 MG/ML
INJECTION, SOLUTION INTRAVENOUS PRN
Status: DISCONTINUED | OUTPATIENT
Start: 2023-08-06 | End: 2023-08-11 | Stop reason: HOSPADM

## 2023-08-06 RX ORDER — SODIUM CHLORIDE 0.9 % (FLUSH) 0.9 %
10 SYRINGE (ML) INJECTION
Status: DISCONTINUED | OUTPATIENT
Start: 2023-08-06 | End: 2023-08-11 | Stop reason: HOSPADM

## 2023-08-06 RX ORDER — FAMOTIDINE 20 MG/1
20 TABLET, FILM COATED ORAL 2 TIMES DAILY
Status: DISCONTINUED | OUTPATIENT
Start: 2023-08-06 | End: 2023-08-11 | Stop reason: HOSPADM

## 2023-08-06 RX ORDER — 0.9 % SODIUM CHLORIDE 0.9 %
100 INTRAVENOUS SOLUTION INTRAVENOUS
Status: DISCONTINUED | OUTPATIENT
Start: 2023-08-06 | End: 2023-08-11 | Stop reason: HOSPADM

## 2023-08-06 RX ORDER — MAGNESIUM SULFATE IN WATER 40 MG/ML
2000 INJECTION, SOLUTION INTRAVENOUS PRN
Status: DISCONTINUED | OUTPATIENT
Start: 2023-08-06 | End: 2023-08-11 | Stop reason: HOSPADM

## 2023-08-06 RX ORDER — SODIUM CHLORIDE, SODIUM LACTATE, POTASSIUM CHLORIDE, CALCIUM CHLORIDE 600; 310; 30; 20 MG/100ML; MG/100ML; MG/100ML; MG/100ML
INJECTION, SOLUTION INTRAVENOUS CONTINUOUS
Status: ACTIVE | OUTPATIENT
Start: 2023-08-06 | End: 2023-08-06

## 2023-08-06 RX ORDER — HYDROCODONE BITARTRATE AND ACETAMINOPHEN 5; 325 MG/1; MG/1
1 TABLET ORAL EVERY 6 HOURS PRN
Status: DISCONTINUED | OUTPATIENT
Start: 2023-08-06 | End: 2023-08-11 | Stop reason: HOSPADM

## 2023-08-06 RX ORDER — ASPIRIN 325 MG
325 TABLET ORAL DAILY
Status: DISCONTINUED | OUTPATIENT
Start: 2023-08-06 | End: 2023-08-11 | Stop reason: HOSPADM

## 2023-08-06 RX ORDER — BACLOFEN 10 MG/1
5 TABLET ORAL 2 TIMES DAILY
Status: DISCONTINUED | OUTPATIENT
Start: 2023-08-06 | End: 2023-08-11 | Stop reason: HOSPADM

## 2023-08-06 RX ADMIN — VANCOMYCIN HYDROCHLORIDE 1000 MG: 1 INJECTION, POWDER, LYOPHILIZED, FOR SOLUTION INTRAVENOUS at 03:46

## 2023-08-06 RX ADMIN — VANCOMYCIN HYDROCHLORIDE 750 MG: 750 INJECTION, POWDER, LYOPHILIZED, FOR SOLUTION INTRAVENOUS at 20:24

## 2023-08-06 RX ADMIN — BACLOFEN 5 MG: 10 TABLET ORAL at 20:19

## 2023-08-06 RX ADMIN — IOPAMIDOL 100 ML: 755 INJECTION, SOLUTION INTRAVENOUS at 04:25

## 2023-08-06 RX ADMIN — ASPIRIN 325 MG: 325 TABLET, FILM COATED ORAL at 10:20

## 2023-08-06 RX ADMIN — SODIUM CHLORIDE, PRESERVATIVE FREE 10 ML: 5 INJECTION INTRAVENOUS at 20:20

## 2023-08-06 RX ADMIN — SODIUM CHLORIDE, PRESERVATIVE FREE 10 ML: 5 INJECTION INTRAVENOUS at 10:20

## 2023-08-06 RX ADMIN — FAMOTIDINE 20 MG: 20 TABLET ORAL at 10:14

## 2023-08-06 RX ADMIN — HYDROCODONE BITARTRATE AND ACETAMINOPHEN 1 TABLET: 5; 325 TABLET ORAL at 15:49

## 2023-08-06 RX ADMIN — TUBERCULIN PURIFIED PROTEIN DERIVATIVE 5 UNITS: 5 INJECTION, SOLUTION INTRADERMAL at 10:14

## 2023-08-06 RX ADMIN — MORPHINE SULFATE 4 MG: 4 INJECTION INTRAVENOUS at 05:03

## 2023-08-06 RX ADMIN — VANCOMYCIN HYDROCHLORIDE 1500 MG: 10 INJECTION, POWDER, LYOPHILIZED, FOR SOLUTION INTRAVENOUS at 11:02

## 2023-08-06 RX ADMIN — FAMOTIDINE 20 MG: 20 TABLET ORAL at 20:19

## 2023-08-06 RX ADMIN — SODIUM CHLORIDE 1000 ML: 9 INJECTION, SOLUTION INTRAVENOUS at 05:04

## 2023-08-06 RX ADMIN — CEFTRIAXONE 1000 MG: 1 INJECTION, POWDER, FOR SOLUTION INTRAMUSCULAR; INTRAVENOUS at 10:07

## 2023-08-06 RX ADMIN — ENOXAPARIN SODIUM 40 MG: 100 INJECTION SUBCUTANEOUS at 10:14

## 2023-08-06 RX ADMIN — BACLOFEN 5 MG: 10 TABLET ORAL at 13:49

## 2023-08-06 RX ADMIN — SODIUM CHLORIDE, POTASSIUM CHLORIDE, SODIUM LACTATE AND CALCIUM CHLORIDE: 600; 310; 30; 20 INJECTION, SOLUTION INTRAVENOUS at 12:05

## 2023-08-06 RX ADMIN — KETOROLAC TROMETHAMINE 15 MG: 30 INJECTION, SOLUTION INTRAMUSCULAR at 13:25

## 2023-08-06 ASSESSMENT — PAIN - FUNCTIONAL ASSESSMENT
PAIN_FUNCTIONAL_ASSESSMENT: 0-10
PAIN_FUNCTIONAL_ASSESSMENT: ACTIVITIES ARE NOT PREVENTED

## 2023-08-06 ASSESSMENT — PAIN SCALES - GENERAL
PAINLEVEL_OUTOF10: 6
PAINLEVEL_OUTOF10: 0
PAINLEVEL_OUTOF10: 2
PAINLEVEL_OUTOF10: 6
PAINLEVEL_OUTOF10: 4
PAINLEVEL_OUTOF10: 2
PAINLEVEL_OUTOF10: 5

## 2023-08-06 ASSESSMENT — LIFESTYLE VARIABLES
HOW MANY STANDARD DRINKS CONTAINING ALCOHOL DO YOU HAVE ON A TYPICAL DAY: PATIENT DOES NOT DRINK
HOW OFTEN DO YOU HAVE A DRINK CONTAINING ALCOHOL: NEVER

## 2023-08-06 ASSESSMENT — PAIN DESCRIPTION - LOCATION
LOCATION: FOOT
LOCATION: FOOT
LOCATION: ANKLE
LOCATION: ANKLE

## 2023-08-06 ASSESSMENT — PAIN DESCRIPTION - DESCRIPTORS
DESCRIPTORS: ACHING;BURNING;DISCOMFORT
DESCRIPTORS: ACHING
DESCRIPTORS: ACHING;BURNING;DISCOMFORT
DESCRIPTORS: ACHING

## 2023-08-06 ASSESSMENT — PAIN DESCRIPTION - ONSET
ONSET: ON-GOING
ONSET: PROGRESSIVE

## 2023-08-06 ASSESSMENT — PAIN DESCRIPTION - PAIN TYPE
TYPE: CHRONIC PAIN
TYPE: CHRONIC PAIN

## 2023-08-06 ASSESSMENT — PAIN DESCRIPTION - ORIENTATION
ORIENTATION: RIGHT
ORIENTATION: RIGHT
ORIENTATION: LEFT;RIGHT
ORIENTATION: LEFT;RIGHT

## 2023-08-06 ASSESSMENT — PAIN DESCRIPTION - FREQUENCY
FREQUENCY: CONTINUOUS
FREQUENCY: CONTINUOUS

## 2023-08-06 ASSESSMENT — ENCOUNTER SYMPTOMS: COLOR CHANGE: 1

## 2023-08-06 NOTE — PROGRESS NOTES
It is with great neto we pray for your family today:        \" If you trust him to save your soul,  Why would you not also,trust him to care for your body and life? He has never refused to carry your burdens; He has never fainted under their weight. So then, be finished with anxious worry, and leave all your concerns behind,  Placing them in the hand of a gracious God. \"              Faithful God,    The weight of my illness is more than I can bear. I need your help. I place the full weight of my burdens in your loving care. Thank you for being so faithful to me.     Jane vaughn  [phone]

## 2023-08-06 NOTE — ED TRIAGE NOTES
Rectal bleeding started 1 hour ago     Weakness started yesterday   Pt has history of MS right leg contraction

## 2023-08-06 NOTE — ACP (ADVANCE CARE PLANNING)
[facility] Hospitalist Service  At the heart of better care     Advance Care Planning   Admit Date:  2023  2:22 AM   Name:  Jerson Calero   Age:  78 y.o. Sex:  male  :  1955   MRN:  [id number]   Room:  [facility] [address] has capacity to make his own decisions:   Yes    If pt unable to make decisions, POA/surrogate decision maker:  His Sister Jennie Clay     Other people present:   None    Patient / surrogate decision-maker directed code status:  Full Code    Other ACP topics discussed, if applicable:   Malnutrition, Acute on Chronic Pain and Functional decline, MS    Patient or surrogate consented to discussion of the current conditions, workup, management plans, prognosis, and the risk for further deterioration. Time spent: 16 minutes in direct discussion.       Signed:  Alda Pham MD

## 2023-08-06 NOTE — ED PROVIDER NOTES
Emergency Department Provider Note       PCP: PROVIDER UNKNOWN   Age: 78 y.o. Sex: male     DISPOSITION Decision To Admit 08/06/2023 04:44:58 AM       ICD-10-CM    1. Skin ulcer of sacrum, unspecified ulcer stage ([address])  L98.429       2. Cellulitis of gluteal region  L03.317       3. MS (multiple sclerosis) ([address]  G35           Medical Decision Making     Complexity of Problems Addressed:  1 or more acute illnesses that pose a threat to life or bodily function. Data Reviewed and Analyzed:  Category 1:   I independently ordered and reviewed each unique test.  I reviewed external records: ED visit note from an outside group. I reviewed external records: provider visit note from outside specialist.  I reviewed external records: previous EKG including cardiologist interpretation. I reviewed external records: previous lab results from outside ED. I reviewed external records: previous imaging study including radiologist interpretation. Category 2:   I independently ordered and interpreted the ED EKG in the absence of a Cardiologist.    Rate: 96  EKG Interpretation: EKG Interpretation: sinus rhythm, no evidence of arrhythmia  ST Segments: Normal ST segments - NO STEMI  I interpreted the X-rays no pneumothorax. Category 3: Discussion of management or test interpretation. DDX:  Decubitus ulcer, cellulitis, osteomyelitis, sepsis, renal failure, dehydration, pneumonia, UTI,    The patient was admitted and I have discussed patient management with the admitting provider. Risk of Complications and/or Morbidity of Patient Management:  Parental controlled substances given in the ED. Shared medical decision making was utilized in creating the patients health plan today. ED Course as of 08/06/23 0506   Sun Aug 06, 2023   0450 Dr. Joan Escobar with the hospitalist group was consulted regarding the need for admission secondary to cellulitis. She is agreeable with this plan.  [KH]   6819 Patient was updated 23 0504)   vancomycin (VANCOCIN) 1,000 mg in sodium chloride 0.9 % 250 mL IVPB ([id number]) (1,000 mg IntraVENous New Bag 23 9616)   iopamidol (ISOVUE-370) 76 % injection 100 mL (100 mLs IntraVENous Given 23 1505)   morphine injection 4 mg (4 mg IntraVENous Given 23 3733)       New Prescriptions    No medications on file        Past Medical History:   Diagnosis Date    Cellulitis     Chronic obstructive pulmonary disease (HCC)     Generalized weakness     Methamphetamine abuse (HCC)     Multiple sclerosis ([address])     wheelchair bound; can't extend lower extremties    PVD (peripheral vascular disease) ([address])     has stent in groin    Sacral ulcer ([address])     Skin ulcers of foot, 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 Social History     Socioeconomic History    Marital status: Single   Tobacco Use    Smoking status: Former     Types: Cigarettes     Quit date: 2020     Years since quittin.6    Smokeless tobacco: Never    Tobacco comments:     Quit smoking: continues non-smoking status   Substance and Sexual Activity    Alcohol use: Never    Drug use: Not Currently        Previous Medications    ASPIRIN 325 MG TABLET    Take 325 mg by mouth daily    FAMOTIDINE (PEPCID) 20 MG TABLET    Take 20 mg by mouth 2 times daily    ZINC GLUCONATE 50 MG TABLET    Take 50 mg by mouth daily        Results for orders placed or performed during the hospital encounter of 23   Critical Care    Narrative    Monika Antonio DO     2023  5:00 AM  Critical Care  Performed by: Monika Antonio DO  Authorized by: Monika Antonio DO     Comments:      Critical care time: 30 minutes of critical care time was performed in   the emergency department.  This was separate from any other

## 2023-08-06 NOTE — CARE COORDINATION
Chart reviewed by  for potential discharge needs or concerns. None identified at present. Please notify/consult  if discharge needs arise. 08/06/23 0166   Service Assessment   Patient Orientation Alert and Oriented   Cognition Alert   History Provided By Medical Record   Primary Caregiver Friend  (roomates help as needed.)   Support Systems Friends/Neighbors   Patient's Healthcare Decision Maker is: Legal Next of [address] [facility]   PCP Verified by [employer] Yes  ([address])   Prior Functional Level Independent in ADLs/IADLs   Current Functional Level Independent in ADLs/IADLs   Can patient return to prior living arrangement Unknown at present   Ability to make needs known: Good   Family able to assist with home care needs: Yes   Would you like for me to discuss the discharge plan with any other family members/significant others, and if so, who? No   Financial Resources Medicaid; Medicare   Social/Functional History   Lives With Friend(s)   Type of Home Apartment   Home Layout One level   Home Access Level entry   Bathroom Shower/Tub Walk-in shower   Bathroom Toilet Handicap height   Bathroom Equipment Grab bars in shower   [address] [facility] Help From Friend(s)   ADL Assistance Independent   Ambulation Assistance Non-ambulatory   Transfer Assistance Independent   Active  No   Patient's  Info friends   Occupation On disability; Retired   Discharge Planning   Type of Residence Other (Comment)  (to be determined)   Living Arrangements Friends   DME Ordered? No   Potential Assistance Purchasing Medications No   Patient expects to be discharged to: Unknown   History of falls? 1   Services At/After Discharge   Transition of Care Consult (CM Consult) N/A   [employer] Information Provided?  No   Mode of Transport at Discharge Other (see comment)  (friends)   Confirm Follow Up Transport Friends   Condition of Participation:

## 2023-08-06 NOTE — ED NOTES
TRANSFER - OUT REPORT:    Verbal report given to LICO Acosta on Bri Lopez  being transferred to [address] [place] [address] for routine progression of patient care       Report consisted of patient's Situation, Background, Assessment and   Recommendations(SBAR). Information from the following report(s) Nurse Handoff Report was reviewed with the receiving nurse. Marvin Fall Assessment:    Presents to emergency department  because of falls (Syncope, seizure, or loss of consciousness): No  Age > 70: No  Altered Mental Status, Intoxication with alcohol or substance confusion (Disorientation, impaired judgment, poor safety awaremess, or inability to follow instructions): No  Impaired Mobility: Ambulates or transfers with assistive devices or assistance; Unable to ambulate or transer.: Yes             Lines:   Peripheral IV 08/06/23 Left;Proximal Forearm (Active)       Peripheral IV 08/06/23 Right Forearm (Active)       Peripheral IV 08/06/23 Distal;Right Cephalic (Active)        Opportunity for questions and clarification was provided.       Patient transported with:  Monitor           Maggie Pineda RN  08/06/23 0311

## 2023-08-06 NOTE — PROGRESS NOTES
TRANSFER - IN REPORT:    Verbal report received from LICO Sarabia on Damion Palma  being received from ED for routine progression of patient care      Report consisted of patient's Situation, Background, Assessment and   Recommendations(SBAR). Information from the following report(s) Nurse Handoff Report was reviewed with the receiving nurse. Opportunity for questions and clarification was provided. Assessment completed upon patient's arrival to unit and care assumed.

## 2023-08-06 NOTE — H&P
Hospitalist History and Physical   Admit Date:  2023  2:22 AM   Name:  Iris Malave   Age:  78 y.o. Sex:  male  :  1955   MRN:  [id number]   Room:  [facility]/    Presenting/Chief Complaint: Rectal Bleeding     Reason(s) for Admission: Cellulitis of gluteal region [D85.479]  MS (multiple sclerosis) ([facility]) [G35]  Cellulitis of skin [L03.90]  Skin ulcer of sacrum, unspecified ulcer stage ([address]) [L98.429]     History of Present Illness:   Iris Malave is a 78 y.o. male with medical history of Multiple sclerosis who presented with   Generalized weakness and fatigue, lack of appetite, weight loss, chronic/l foot and sacral ulcers. He  Has wound care and visiting nurse services, at baseline he is primarily bed bound and is usually able to scoot on to his power wheel chair but has not had the strength to do this as of recently. He has increased sacral pain, but has not had fever or chills. In the ED he was noted to have an elevated lactic acid level, serum procal, and leukocytosis, HR 90, Afebrile, RR 19-27. On Skin examination he was noted to have sacral ulcer with surrounding skin cellulitis, he was started on IV Fluids and IV Antibiotics and admitted for continued care. Assessment & Plan:     Sepsis   Skin Cellulitis  Sacral Decubitus Ulcer  --Sepsis and Decubitus ulcer(s) Present on admission, bacterial infection suspected, unknown causative organism  --Start IV Ceftriaxone + IV Vancomycin, wound care consult     MS  Functional Quadriplegia  Severe Protein Calorie Malnutrition with sarcopenia   --PT Eval, nutritional supplements ordered, start Baclofen for spastic contractions and pain     Elevated Lactic Acid  Clinical Dehydration   --Start IV LR, Encourage improved oral intake       PT/OT evals and PPD needed/ordered? Yes  Diet: ADULT DIET;  Regular  ADULT ORAL NUTRITION SUPPLEMENT; Breakfast, Lunch, Dinner; Standard High Calorie/High Protein Oral Supplement  VTE prophylaxis: abnormality      Confirmed by Jesus Alberto Ryan MD, Mckenzie Galileotler ([id number]) on 8/6/2023 7:34:29 AM     Lactate, Sepsis    Collection Time: 08/06/23  9:27 AM   Result Value Ref Range    Lactic Acid, Sepsis 2.5 (H) 0.4 - 2.0 MMOL/L   Urinalysis    Collection Time: 08/06/23 12:08 PM   Result Value Ref Range    Color, UA YELLOW/STRAW      Appearance CLEAR      Specific Gravity, UA >1.035 (H) 1.001 - 1.023    pH, Urine 6.0 5.0 - 9.0      Protein, UA Negative NEG mg/dL    Glucose,  mg/dL    Ketones, Urine Negative NEG mg/dL    Bilirubin Urine Negative NEG      Blood, Urine Negative NEG      Urobilinogen, Urine 1.0 0.2 - 1.0 EU/dL    Nitrite, Urine Negative NEG      Leukocyte Esterase, Urine Negative NEG         I have personally reviewed imaging studies:  CT PELVIS W CONTRAST Additional Contrast? Radiologist Recommendation    Result Date: 8/6/2023  EXAMINATION: CT SCAN OF THE PELVIS WITH INTRAVENOUS CONTRAST DATE OF EXAM: 8/6/2023 3:15 AM HISTORY: Osteomyelitis  rectal bleeding starting one hour ago COMPARISON: 7/19/2023. TECHNIQUE: CT examination of the pelvis was performed following the intravenous administration of iodinated contrast. CT dose lowering techniques were used, to include: automated exposure control, adjustment for patient size, and/or use of iterative reconstruction. FINDINGS: PELVIS: GI Tract: Above average stool burden is seen in the partially visualized colon. No hyperenhancing focus to suggest intraluminal hemorrhage. Mildly hyperenhancing rectal wall with mild thickening. No suspicious mass. Mesentery/Peritoneum: Normal. Vasculature: Moderate atherosclerotic vascular disease. Lymph Nodes: Normal. Abdominal Wall: Normal. Bladder: Normal. Reproductive: Normal. Musculoskeletal: No acute findings. 1.  Correlate for developing stercoral colitis. 2.  Correlate for constipation. Thank you for the referral of this patient.  This exam was interpreted by an American Board of Radiology certified radiologist with

## 2023-08-07 PROBLEM — L98.429 SKIN ULCER OF SACRUM (HCC): Status: ACTIVE | Noted: 2023-08-07

## 2023-08-07 PROBLEM — L03.317 CELLULITIS OF GLUTEAL REGION: Status: ACTIVE | Noted: 2023-08-07

## 2023-08-07 LAB
MM INDURATION, POC: 0 MM (ref 0–5)
PPD, POC: NEGATIVE
VANCOMYCIN SERPL-MCNC: 15.1 UG/ML

## 2023-08-07 PROCEDURE — 2580000003 HC RX 258: Performed by: INTERNAL MEDICINE

## 2023-08-07 PROCEDURE — 36415 COLL VENOUS BLD VENIPUNCTURE: CPT

## 2023-08-07 PROCEDURE — 6370000000 HC RX 637 (ALT 250 FOR IP): Performed by: INTERNAL MEDICINE

## 2023-08-07 PROCEDURE — 99222 1ST HOSP IP/OBS MODERATE 55: CPT | Performed by: SURGERY

## 2023-08-07 PROCEDURE — 96376 TX/PRO/DX INJ SAME DRUG ADON: CPT

## 2023-08-07 PROCEDURE — 80202 ASSAY OF VANCOMYCIN: CPT

## 2023-08-07 PROCEDURE — 96366 THER/PROPH/DIAG IV INF ADDON: CPT

## 2023-08-07 PROCEDURE — 97530 THERAPEUTIC ACTIVITIES: CPT

## 2023-08-07 PROCEDURE — 97161 PT EVAL LOW COMPLEX 20 MIN: CPT

## 2023-08-07 PROCEDURE — 6360000002 HC RX W HCPCS: Performed by: INTERNAL MEDICINE

## 2023-08-07 PROCEDURE — 2580000003 HC RX 258: Performed by: HOSPITALIST

## 2023-08-07 PROCEDURE — 96372 THER/PROPH/DIAG INJ SC/IM: CPT

## 2023-08-07 PROCEDURE — 6370000000 HC RX 637 (ALT 250 FOR IP): Performed by: HOSPITALIST

## 2023-08-07 PROCEDURE — 6360000002 HC RX W HCPCS: Performed by: HOSPITALIST

## 2023-08-07 PROCEDURE — 1100000000 HC RM PRIVATE

## 2023-08-07 RX ORDER — METRONIDAZOLE 500 MG/1
500 TABLET ORAL EVERY 8 HOURS SCHEDULED
Status: DISCONTINUED | OUTPATIENT
Start: 2023-08-07 | End: 2023-08-11

## 2023-08-07 RX ADMIN — HYDROCODONE BITARTRATE AND ACETAMINOPHEN 1 TABLET: 5; 325 TABLET ORAL at 19:09

## 2023-08-07 RX ADMIN — CEFTRIAXONE 1000 MG: 1 INJECTION, POWDER, FOR SOLUTION INTRAMUSCULAR; INTRAVENOUS at 09:40

## 2023-08-07 RX ADMIN — SODIUM CHLORIDE, PRESERVATIVE FREE 5 ML: 5 INJECTION INTRAVENOUS at 19:52

## 2023-08-07 RX ADMIN — BACLOFEN 5 MG: 10 TABLET ORAL at 09:19

## 2023-08-07 RX ADMIN — SODIUM CHLORIDE, PRESERVATIVE FREE 10 ML: 5 INJECTION INTRAVENOUS at 09:20

## 2023-08-07 RX ADMIN — VANCOMYCIN HYDROCHLORIDE 750 MG: 750 INJECTION, POWDER, LYOPHILIZED, FOR SOLUTION INTRAVENOUS at 21:53

## 2023-08-07 RX ADMIN — ASPIRIN 325 MG: 325 TABLET, FILM COATED ORAL at 09:19

## 2023-08-07 RX ADMIN — METRONIDAZOLE 500 MG: 500 TABLET ORAL at 14:37

## 2023-08-07 RX ADMIN — VANCOMYCIN HYDROCHLORIDE 750 MG: 750 INJECTION, POWDER, LYOPHILIZED, FOR SOLUTION INTRAVENOUS at 10:25

## 2023-08-07 RX ADMIN — BACLOFEN 5 MG: 10 TABLET ORAL at 19:52

## 2023-08-07 RX ADMIN — KETOROLAC TROMETHAMINE 15 MG: 30 INJECTION, SOLUTION INTRAMUSCULAR at 21:52

## 2023-08-07 RX ADMIN — FAMOTIDINE 20 MG: 20 TABLET ORAL at 19:52

## 2023-08-07 RX ADMIN — ENOXAPARIN SODIUM 40 MG: 100 INJECTION SUBCUTANEOUS at 09:19

## 2023-08-07 RX ADMIN — METRONIDAZOLE 500 MG: 500 TABLET ORAL at 21:52

## 2023-08-07 RX ADMIN — FAMOTIDINE 20 MG: 20 TABLET ORAL at 09:19

## 2023-08-07 ASSESSMENT — PAIN DESCRIPTION - DESCRIPTORS
DESCRIPTORS: SORE;DISCOMFORT
DESCRIPTORS: ACHING;DISCOMFORT;SORE

## 2023-08-07 ASSESSMENT — ENCOUNTER SYMPTOMS
COUGH: 0
COLOR CHANGE: 1
BACK PAIN: 0
SHORTNESS OF BREATH: 0
FACIAL SWELLING: 0
CHEST TIGHTNESS: 0
DIARRHEA: 0
RECTAL PAIN: 1
NAUSEA: 0
EYE ITCHING: 0
ABDOMINAL DISTENTION: 0
VOMITING: 0
ABDOMINAL PAIN: 0
BLOOD IN STOOL: 0
EYE PAIN: 0
CONSTIPATION: 1

## 2023-08-07 ASSESSMENT — PAIN DESCRIPTION - ONSET
ONSET: GRADUAL
ONSET: ON-GOING

## 2023-08-07 ASSESSMENT — PAIN - FUNCTIONAL ASSESSMENT: PAIN_FUNCTIONAL_ASSESSMENT: PREVENTS OR INTERFERES SOME ACTIVE ACTIVITIES AND ADLS

## 2023-08-07 ASSESSMENT — PAIN DESCRIPTION - FREQUENCY
FREQUENCY: CONTINUOUS
FREQUENCY: INTERMITTENT

## 2023-08-07 ASSESSMENT — PAIN DESCRIPTION - ORIENTATION
ORIENTATION: POSTERIOR;LEFT
ORIENTATION: POSTERIOR

## 2023-08-07 ASSESSMENT — PAIN DESCRIPTION - LOCATION
LOCATION: SACRUM;FOOT
LOCATION: SACRUM

## 2023-08-07 ASSESSMENT — PAIN SCALES - GENERAL
PAINLEVEL_OUTOF10: 0
PAINLEVEL_OUTOF10: 5
PAINLEVEL_OUTOF10: 5
PAINLEVEL_OUTOF10: 0
PAINLEVEL_OUTOF10: 0

## 2023-08-07 ASSESSMENT — PAIN DESCRIPTION - PAIN TYPE
TYPE: CHRONIC PAIN
TYPE: CHRONIC PAIN

## 2023-08-07 NOTE — PLAN OF CARE
Problem: Discharge Planning  Goal: Discharge to home or other facility with appropriate resources  Outcome: Progressing  Flowsheets (Taken 8/6/2023 1925)  Discharge to home or other facility with appropriate resources: Identify barriers to discharge with patient and caregiver     Problem: Pain  Goal: Verbalizes/displays adequate comfort level or baseline comfort level  Outcome: Progressing     Problem: Neurosensory - Adult  Goal: Achieves maximal functionality and self care  Outcome: Progressing  Flowsheets (Taken 8/6/2023 1925)  Achieves maximal functionality and self care: Monitor swallowing and airway patency with patient fatigue and changes in neurological status     Problem: Respiratory - Adult  Goal: Achieves optimal ventilation and oxygenation  Outcome: Progressing     Problem: Cardiovascular - Adult  Goal: Maintains optimal cardiac output and hemodynamic stability  Outcome: Progressing     Problem: Skin/Tissue Integrity - Adult  Goal: Skin integrity remains intact  Outcome: Progressing  Flowsheets (Taken 8/6/2023 1925)  Skin Integrity Remains Intact: Monitor for areas of redness and/or skin breakdown  Goal: Incisions, wounds, or drain sites healing without S/S of infection  Outcome: Progressing  Goal: Oral mucous membranes remain intact  Outcome: Progressing     Problem: Musculoskeletal - Adult  Goal: Return ADL status to a safe level of function  Outcome: Progressing     Problem: Gastrointestinal - Adult  Goal: Maintains or returns to baseline bowel function  Outcome: Progressing  Goal: Maintains adequate nutritional intake  Outcome: Progressing     Problem: Genitourinary - Adult  Goal: Absence of urinary retention  Outcome: Progressing     Problem: Infection - Adult  Goal: Absence of infection at discharge  Outcome: Progressing  Flowsheets (Taken 8/6/2023 1925)  Absence of infection at discharge:   Assess and monitor for signs and symptoms of infection   Monitor lab/diagnostic results  Goal: Absence of infection during hospitalization  Outcome: Progressing     Problem: Metabolic/Fluid and Electrolytes - Adult  Goal: Electrolytes maintained within normal limits  Outcome: Progressing  Goal: Hemodynamic stability and optimal renal function maintained  Outcome: Progressing     Problem: Hematologic - Adult  Goal: Maintains hematologic stability  Outcome: Progressing     Problem: Anxiety  Goal: Will report anxiety at manageable levels  Description: INTERVENTIONS:  1. Administer medication as ordered  2. Teach and rehearse alternative coping skills  3. Provide emotional support with 1:1 interaction with staff  Outcome: Progressing     Problem: Coping  Goal: Pt/Family able to verbalize concerns and demonstrate effective coping strategies  Description: INTERVENTIONS:  1. Assist patient/family to identify coping skills, available support systems and cultural and spiritual values  2. Provide emotional support, including active listening and acknowledgement of concerns of patient and caregivers  3. Reduce environmental stimuli, as able  4. Instruct patient/family in relaxation techniques, as appropriate  5.  Assess for spiritual pain/suffering and initiate Spiritual Care, Psychosocial Clinical Specialist consults as needed  Outcome: Progressing     Problem: Safety - Adult  Goal: Free from fall injury  Outcome: Progressing

## 2023-08-07 NOTE — WOUND CARE
Patient seen for bilateral feet and sacral area wounds. Noted left foot have healing dry thin scabs. Xeroform gauze in use and appropriate for left. Right 2nd toe has dry eschar, paint with betadine and leave open to air. Cleaned and new dressings placed. Heel suspension boot on left. Right leg contracted and boot difficult to place. Pillow in use. Sacrum has 9x6cm area of pink and necrotic based wound. May benefit from general surgery team evaluating for possible debridement needs. Discussed with patient and he is in agreement. Primary nurse at bedside and assisted. New dressing placed to sacrum. May get wound bed/low air loss from 10th floor. If not available may call wound team for bed order to be brought into facility.      sacrum    Left foot    Right 2nd toe

## 2023-08-07 NOTE — CONSULTS
Josep Olmedo MD   Bariatric & Advanced Laparoscopic Surgery & Endoscopy  [address] [facility], [address] [place] [address]  Phone [phone]   Fax [phone]      Date of visit: 2023      Primary/Requesting provider: PROVIDER UNKNOWN         Name: Calvin Benavides      MRN: [id number]       : 1955       Age: 78 y.o. Sex: male        PCP: PROVIDER UNKNOWN     CC:    Chief Complaint   Patient presents with    Rectal Bleeding       HPI:     Calvin Benavides is a 78 y.o. male who we are consulted for possible decubitus ulcer debridement. He reports having gluteal pain for about 1 week now. He is bed bound and depends on a power chair to move around. He has a pillow he places on the chair but that still causes pressure on his bottom. He denies any fever or chills. No nausea or vomiting. He has history of multiple sclerosis. He also has history of chronic foot ulcers. PMH:    Past Medical History:   Diagnosis Date    Cellulitis     Chronic obstructive pulmonary disease (HCC)     Generalized weakness     Methamphetamine abuse ([address])     Multiple sclerosis ([address])     wheelchair bound; can't extend lower extremties    PVD (peripheral vascular disease) ([address])     has stent in groin    Sacral ulcer ([address])     Skin ulcers of foot, bilateral ([address])     chronic       PSH:    Past Surgical History:   Procedure Laterality Date    VASCULAR SURGERY  2022    Ultrasound-guided access of the left common femoral artery with placement of a catheter in the aorta for aortogram.Bilateral lower extremity arteriograms.        MEDS:    Current Facility-Administered Medications   Medication Dose Route Frequency Provider Last Rate Last Admin    metroNIDAZOLE (FLAGYL) tablet 500 mg  500 mg Oral 3 times per day Yuriy Gandhi MD        sodium chloride 0.9 % bolus 100 mL  100 mL IntraVENous ONCE PRN Constanza Brumfield DO        sodium chloride flush 0.9 % injection 10 mL  10 mL hour ago    COMPARISON: 7/19/2023. TECHNIQUE: CT examination of the pelvis was performed following the intravenous   administration of iodinated contrast. CT dose lowering techniques were used, to   include: automated exposure control, adjustment for patient size, and/or use of   iterative reconstruction. FINDINGS:    PELVIS:    GI Tract: Above average stool burden is seen in the partially visualized colon. No hyperenhancing focus to suggest intraluminal hemorrhage. Mildly   hyperenhancing rectal wall with mild thickening. No suspicious mass. Mesentery/Peritoneum: Normal.    Vasculature: Moderate atherosclerotic vascular disease. Lymph Nodes: Normal.     Abdominal Wall: Normal.     Bladder: Normal.     Reproductive: Normal.     Musculoskeletal: No acute findings. Impression: 1. Correlate for developing stercoral colitis. 2.  Correlate for constipation. Thank you for the referral of this patient. This exam was interpreted by an   American Board of Radiology certified radiologist with subspecialty training. If   there are any questions regarding this exam please feel free to contact a   radiologist directly at [phone]. Slot: 70     Vasu Ambrose M.D.   8/6/2023 5:00:00 AM  XR CHEST PORTABLE  Narrative: EXAMINATION: FRONTAL VIEW OF THE CHEST    CLINICAL INDICATION: Sepsis    COMPARISON: 3/14/2022    FINDINGS:     No focal consolidation, pleural effusion or pneumothorax. Hyperinflation is   redemonstrated. Cardiomediastinal morphology is normal.     Visualized bones and soft tissues show no suspicious findings. Impression: No acute cardiopulmonary abnormality. Hyperinflation suggests emphysema. Thank you for the referral of this patient. This exam was interpreted by an   American Board of Radiology certified radiologist with subspecialty training. If   there are any questions regarding this exam please feel free to contact a   radiologist directly at [phone].     Slot:

## 2023-08-07 NOTE — PROGRESS NOTES
Comprehensive Nutrition Assessment    Type and Reason for Visit: Initial, Positive Nutrition Screen  Malnutrition Screening Tool: Malnutrition Screen  Have you recently lost weight without trying?: Unsure of amount of weight loss (2 points)  Have you been eating poorly because of a decreased appetite?: No (0 points)  Malnutrition Screening Tool Score: 2    Nutrition Recommendations/Plan:   Meals and Snacks:  Diet: Continue current order  Nutrition Supplement Therapy:  Medical food supplement therapy:  Change Ensure Enlive three times per day (this provides 350 kcal and 20 grams protein per bottle) and Kirk twice per day (this provides 90 kcal and 2.5 grams protein per packet)     Malnutrition Assessment:  Malnutrition Status: Moderate malnutrition  Context: Chronic Illness  Findings of clinical characteristics of malnutrition:   Energy Intake:  Mild decrease in energy intake (Comment) (pt reports as of the last few days he has had a decline in appetite.)  Weight Loss:  Unable to assess (Pt does not have an accurate weight history in epic, with all stated weights recorded at 130 lbs - and was unable to provide an accurate weight history.)     Body Fat Loss:  Mild body fat loss Orbital, Triceps, Buccal region   Muscle Mass Loss:  Mild muscle mass loss Temples (temporalis), Hand (interosseous)  Fluid Accumulation:  Unable to assess     Strength:  Not Performed     Nutrition Assessment:  Nutrition History: Pt reports recent poor PO intake at home, but is unable to quantify. Pt unaware of weight history, other than that his roommate has told him he has lost some weight. He believes this has been occurring over the last several months. Pt reports UBW of 130 lbs, bed weight today of 117 lbs. Weight is likely lower than this truly 2/2 pillows and materials on bed. Pt reports he is living in an apartment with friends and they will cook him food at times. States that he has 2-3 meals per day.  States he is working with change: -10       BMI Category Underweight (BMI less than 22) age over 72  Estimated Daily Nutrient Needs:  Energy (kcal/day): 5514-5693 (30-35 kcal/kg 2/2 underweigt and malnourished with wounds) (Kcal/kg Weight used: 53.1 kg Current  Protein (g/day): 63-80 (1.2-1.5 g/kg) Weight Used: (Current) 53.1 kg  Fluid (ml/day):   (1 ml/kcal)    Nutrition Diagnosis:   Inadequate oral intake, In context of chronic illness related to  (poor appetite) as evidenced by moderate loss of subcutaneous fat, moderate muscle loss, weight loss  Moderate malnutrition related to other (comment) (inadequate oral intake) as evidenced by Criteria as identified in malnutrition assessment    Nutrition Interventions:   Food and/or Nutrient Delivery: Continue Current Diet, Modify Oral Nutrition Supplement     Coordination of Nutrition Care: Continue to monitor while inpatient  Plan of Care discussed with: Pt and RN    Goals: Active Goal: Meet at least 75% of estimated needs, by next RD assessment       Nutrition Monitoring and Evaluation:      Food/Nutrient Intake Outcomes: Supplement Intake, Food and Nutrient Intake  Physical Signs/Symptoms Outcomes: Skin, Weight, Meal Time Behavior    Discharge Planning:     Too soon to determine    Jason Medina RD

## 2023-08-07 NOTE — PROGRESS NOTES
Hospitalist Progress Note   Admit Date:  2023  2:22 AM   Name:  Greta Gallagher   Age:  78 y.o. Sex:  male  :  1955   MRN:  [id number]   Room:  1/    Presenting/Chief Complaint: Rectal Bleeding     Reason(s) for Admission: Cellulitis of gluteal region [Y43.810]  MS (multiple sclerosis) ([facility]) [G35]  Cellulitis of skin [L03.90]  Skin ulcer of sacrum, unspecified ulcer stage [facility]) [address] Course:   Greta Gallagher is a 78 y.o. male with medical history of   Multiple sclerosis    who presented with weakness and fatigue, lack of appetite, weight loss, chronic foot and sacral ulcers. His wound care and visiting nurse came to see him on the day of admission. At baseline he is bedbound. Recently patient does not have enough power to scoot onto his power wheelchair. He is mostly bedridden. He has had increased sacral pain. No fever. No chills. In the emergency room he was found to have elevated lactic acid, serum procalcitonin and leukocytosis. He was found to have sacral ulcer with surrounding cellulitis. He was started on IV fluid and antibiotics. CT pelvis with IV contrast shows;  1. Correlate for developing stercoral colitis. 2.  Correlate for constipation. Subjective & 24hr Events:     23   Patient reports no abdominal pain. He reports that the pain in the sacral area is improving. No fever since admission. No shortness of breath. No nausea, no vomiting. Assessment & Plan:     Principal Problem:    Cellulitis of skin    Decubitus ulcer    Elevated lactic acid level    Dehydration  Plan:   Continue with vancomycin and ceftriaxone. The fact that CT scan also read for possible colitis, I will add metronidazole as well. This can help with potential organism causing sacral ulcer and cellulitis too. Active Problems:    Functional quadriplegia (HCC)  Plan: This adds to complexity of care.         Severe protein-calorie IntraDERmal Once    vancomycin (VANCOCIN) 750 mg in sodium chloride 0.9 % 250 mL IVPB ([id number])  750 mg IntraVENous Q12H    baclofen (LIORESAL) tablet 5 mg  5 mg Oral BID       Signed:  Delilah Vargas MD    Part of this note may have been written by using a voice dictation software. The note has been proof read but may still contain some grammatical/other typographical errors.

## 2023-08-07 NOTE — PROGRESS NOTES
ACUTE PHYSICAL THERAPY GOALS:   (Developed with and agreed upon by patient and/or caregiver.)  (1.) Per Joyner  will move from supine to sit and sit to supine , scoot up and down, and roll side to side with CONTACT GUARD ASSIST within 7 treatment day(s). (2.) Per Joyner will transfer from bed to chair and chair to bed with CONTACT GUARD ASSIST using the least restrictive device within 7 treatment day(s). (3.) Per Joyner will perform sitting static and dynamic balance activities x 20 minutes with CONTACT GUARD ASSIST to improve safety within 7 treatment day(s). (4.) Per Joyner will perform standing static and dynamic balance activities x 5 minutes with CONTACT GUARD ASSIST to improve safety within 7 treatment day(s). (5.) Per Joyner will perform therapeutic exercises x 20 min for HEP with CONTACT GUARD ASSIST to improve strength, endurance, and functional mobility within 7 treatment day(s). PHYSICAL THERAPY Initial Assessment and Daily Note  (Link to Caseload Tracking: PT Visit Days : 1  Acknowledge Orders  Time In/Out  PT Charge Capture  Rehab Caseload Tracker    Per Joyner is a 78 y.o. male   PRIMARY DIAGNOSIS: Cellulitis of skin  Cellulitis of gluteal region [I57.225]  MS (multiple sclerosis) ([address]) [G35]  Cellulitis of skin [L03.90]  Skin ulcer of sacrum, unspecified ulcer stage ([address]) [T71.665]       Reason for Referral: Generalized Muscle Weakness (M62.81)  Other abnormalities of gait and mobility (R26.89)  Inpatient: Payor: [employer]rys [employer] / Plan: [employer] G[employer]MO / Product Type: *No Product type* /     ASSESSMENT:     REHAB RECOMMENDATIONS:   Recommendation to date pending progress:  Setting:  Short-term Rehab    Equipment:    To Be Determined     ASSESSMENT:  Mr. Mark Vogel is a 78year old M who presents with cellulitis of skin, weakness and fatigue. Pt is Daniel and uses an electric wheelchair at baseline.   Pt lives with

## 2023-08-08 LAB
CREAT SERPL-MCNC: 0.6 MG/DL (ref 0.8–1.5)
MM INDURATION, POC: 0 MM (ref 0–5)
PPD, POC: NEGATIVE

## 2023-08-08 PROCEDURE — 94760 N-INVAS EAR/PLS OXIMETRY 1: CPT

## 2023-08-08 PROCEDURE — 6370000000 HC RX 637 (ALT 250 FOR IP): Performed by: INTERNAL MEDICINE

## 2023-08-08 PROCEDURE — 96366 THER/PROPH/DIAG IV INF ADDON: CPT

## 2023-08-08 PROCEDURE — 6370000000 HC RX 637 (ALT 250 FOR IP): Performed by: HOSPITALIST

## 2023-08-08 PROCEDURE — 82565 ASSAY OF CREATININE: CPT

## 2023-08-08 PROCEDURE — 96372 THER/PROPH/DIAG INJ SC/IM: CPT

## 2023-08-08 PROCEDURE — 2580000003 HC RX 258: Performed by: INTERNAL MEDICINE

## 2023-08-08 PROCEDURE — 36415 COLL VENOUS BLD VENIPUNCTURE: CPT

## 2023-08-08 PROCEDURE — 6360000002 HC RX W HCPCS: Performed by: INTERNAL MEDICINE

## 2023-08-08 PROCEDURE — 2580000003 HC RX 258: Performed by: HOSPITALIST

## 2023-08-08 PROCEDURE — 94640 AIRWAY INHALATION TREATMENT: CPT

## 2023-08-08 PROCEDURE — 1100000000 HC RM PRIVATE

## 2023-08-08 PROCEDURE — 6360000002 HC RX W HCPCS: Performed by: HOSPITALIST

## 2023-08-08 RX ORDER — IPRATROPIUM BROMIDE AND ALBUTEROL SULFATE 2.5; .5 MG/3ML; MG/3ML
1 SOLUTION RESPIRATORY (INHALATION) EVERY 4 HOURS PRN
Status: DISCONTINUED | OUTPATIENT
Start: 2023-08-08 | End: 2023-08-11 | Stop reason: HOSPADM

## 2023-08-08 RX ADMIN — ASPIRIN 325 MG: 325 TABLET, FILM COATED ORAL at 08:42

## 2023-08-08 RX ADMIN — ENOXAPARIN SODIUM 40 MG: 100 INJECTION SUBCUTANEOUS at 08:42

## 2023-08-08 RX ADMIN — VANCOMYCIN HYDROCHLORIDE 1250 MG: 10 INJECTION, POWDER, LYOPHILIZED, FOR SOLUTION INTRAVENOUS at 10:10

## 2023-08-08 RX ADMIN — METRONIDAZOLE 500 MG: 500 TABLET ORAL at 13:59

## 2023-08-08 RX ADMIN — HYDROCODONE BITARTRATE AND ACETAMINOPHEN 1 TABLET: 5; 325 TABLET ORAL at 08:41

## 2023-08-08 RX ADMIN — HYDROCODONE BITARTRATE AND ACETAMINOPHEN 1 TABLET: 5; 325 TABLET ORAL at 01:44

## 2023-08-08 RX ADMIN — FAMOTIDINE 20 MG: 20 TABLET ORAL at 08:42

## 2023-08-08 RX ADMIN — METRONIDAZOLE 500 MG: 500 TABLET ORAL at 05:42

## 2023-08-08 RX ADMIN — BACLOFEN 5 MG: 10 TABLET ORAL at 08:42

## 2023-08-08 RX ADMIN — SODIUM CHLORIDE, PRESERVATIVE FREE 10 ML: 5 INJECTION INTRAVENOUS at 08:42

## 2023-08-08 RX ADMIN — FAMOTIDINE 20 MG: 20 TABLET ORAL at 22:12

## 2023-08-08 RX ADMIN — CEFTRIAXONE 1000 MG: 1 INJECTION, POWDER, FOR SOLUTION INTRAMUSCULAR; INTRAVENOUS at 09:30

## 2023-08-08 RX ADMIN — IPRATROPIUM BROMIDE AND ALBUTEROL SULFATE 1 DOSE: 2.5; .5 SOLUTION RESPIRATORY (INHALATION) at 14:04

## 2023-08-08 RX ADMIN — BACLOFEN 5 MG: 10 TABLET ORAL at 22:12

## 2023-08-08 RX ADMIN — SODIUM CHLORIDE, PRESERVATIVE FREE 5 ML: 5 INJECTION INTRAVENOUS at 22:12

## 2023-08-08 RX ADMIN — METRONIDAZOLE 500 MG: 500 TABLET ORAL at 22:12

## 2023-08-08 RX ADMIN — HYDROCODONE BITARTRATE AND ACETAMINOPHEN 1 TABLET: 5; 325 TABLET ORAL at 22:12

## 2023-08-08 RX ADMIN — HYDROCODONE BITARTRATE AND ACETAMINOPHEN 1 TABLET: 5; 325 TABLET ORAL at 16:27

## 2023-08-08 ASSESSMENT — PAIN DESCRIPTION - FREQUENCY
FREQUENCY: CONTINUOUS

## 2023-08-08 ASSESSMENT — PAIN DESCRIPTION - LOCATION
LOCATION: SACRUM
LOCATION: SACRUM;FOOT
LOCATION: SACRUM;FOOT
LOCATION: SACRUM

## 2023-08-08 ASSESSMENT — PAIN DESCRIPTION - PAIN TYPE
TYPE: CHRONIC PAIN

## 2023-08-08 ASSESSMENT — PAIN DESCRIPTION - ORIENTATION
ORIENTATION: POSTERIOR;LEFT
ORIENTATION: POSTERIOR
ORIENTATION: POSTERIOR;LEFT
ORIENTATION: POSTERIOR

## 2023-08-08 ASSESSMENT — PAIN DESCRIPTION - DESCRIPTORS
DESCRIPTORS: DISCOMFORT;SORE;TENDER
DESCRIPTORS: SORE;TENDER
DESCRIPTORS: SORE;PRESSURE

## 2023-08-08 ASSESSMENT — PAIN SCALES - GENERAL
PAINLEVEL_OUTOF10: 6
PAINLEVEL_OUTOF10: 5
PAINLEVEL_OUTOF10: 0
PAINLEVEL_OUTOF10: 5
PAINLEVEL_OUTOF10: 0
PAINLEVEL_OUTOF10: 0
PAINLEVEL_OUTOF10: 6
PAINLEVEL_OUTOF10: 0

## 2023-08-08 ASSESSMENT — PAIN DESCRIPTION - ONSET
ONSET: ON-GOING

## 2023-08-08 ASSESSMENT — PAIN - FUNCTIONAL ASSESSMENT
PAIN_FUNCTIONAL_ASSESSMENT: PREVENTS OR INTERFERES SOME ACTIVE ACTIVITIES AND ADLS

## 2023-08-08 NOTE — PLAN OF CARE
medications as ordered  8/7/2023 1611 by Carolina Sommer RN  Outcome: Progressing     Problem: Metabolic/Fluid and Electrolytes - Adult  Goal: Electrolytes maintained within normal limits  8/7/2023 2226 by Karolyn Pearson RN  Outcome: Progressing  8/7/2023 1611 by Carolina Sommer RN  Outcome: Progressing  Goal: Hemodynamic stability and optimal renal function maintained  8/7/2023 2226 by Karolyn Pearson RN  Outcome: Progressing  8/7/2023 1611 by Carolina Sommer RN  Outcome: Progressing     Problem: Hematologic - Adult  Goal: Maintains hematologic stability  8/7/2023 2226 by Karolyn Pearson RN  Outcome: Progressing  8/7/2023 1611 by Carolina Sommer RN  Outcome: Progressing     Problem: Anxiety  Goal: Will report anxiety at manageable levels  Description: INTERVENTIONS:  1. Administer medication as ordered  2. Teach and rehearse alternative coping skills  3. Provide emotional support with 1:1 interaction with staff  8/7/2023 2226 by Karolyn Pearson RN  Outcome: Progressing  8/7/2023 1611 by Carolina Sommer RN  Outcome: Progressing     Problem: Coping  Goal: Pt/Family able to verbalize concerns and demonstrate effective coping strategies  Description: INTERVENTIONS:  1. Assist patient/family to identify coping skills, available support systems and cultural and spiritual values  2. Provide emotional support, including active listening and acknowledgement of concerns of patient and caregivers  3. Reduce environmental stimuli, as able  4. Instruct patient/family in relaxation techniques, as appropriate  5.  Assess for spiritual pain/suffering and initiate Spiritual Care, Psychosocial Clinical Specialist consults as needed  8/7/2023 2226 by Karolyn Pearson RN  Outcome: Progressing  8/7/2023 1611 by Carolina Sommer RN  Outcome: Progressing     Problem: Safety - Adult  Goal: Free from fall injury  8/7/2023 2226 by Karolyn Pearson RN  Outcome: Progressing  Flowsheets (Taken 8/7/2023

## 2023-08-08 NOTE — PROGRESS NOTES
Hospitalist Progress Note   Admit Date:  2023  2:22 AM   Name:  Tammy Em   Age:  78 y.o. Sex:  male  :  1955   MRN:  [id number]   Room:  70/    Presenting/Chief Complaint: Rectal Bleeding     Reason(s) for Admission: Cellulitis of gluteal region [Y44.530]  MS (multiple sclerosis) ([address] ) [G35]  Cellulitis of skin [L03.90]  Skin ulcer of sacrum, unspecified ulcer stage [facility]) [address] Course:   Tammy Em is a 78 y.o. male with medical history of   Multiple sclerosis    who presented with weakness and fatigue, lack of appetite, weight loss, chronic foot and sacral ulcers. His wound care and visiting nurse came to see him on the day of admission. At baseline he is bedbound. Recently patient does not have enough power to scoot onto his power wheelchair. He is mostly bedridden. He has had increased sacral pain. No fever. No chills. In the emergency room he was found to have elevated lactic acid, serum procalcitonin and leukocytosis. He was found to have sacral ulcer with surrounding cellulitis. He was started on IV fluid and antibiotics. CT pelvis with IV contrast shows;  1. Correlate for developing stercoral colitis. 2.  Correlate for constipation. Subjective & 24hr Events:     23   Patient reports no abdominal pain. He reports that the pain in the sacral area is improving. No fever since admission. No shortness of breath. No nausea, no vomiting. 23   Patient is resting in bed. Afebrile. Assessment & Plan:     Principal Problem:    Cellulitis of skin    Decubitus ulcer    Elevated lactic acid level    Dehydration  Plan:   Continue with vancomycin and ceftriaxone. The fact that CT scan also read for possible colitis, I have added metronidazole. This can help with potential organism causing sacral ulcer and cellulitis too. Surgical service is consulted to see the sacral wound.    As of now Dose Route Frequency    metroNIDAZOLE (FLAGYL) tablet 500 mg  500 mg Oral 3 times per day    vancomycin (VANCOCIN) 1250 mg in sodium chloride 0.9% 250 mL IVPB  1,250 mg IntraVENous Q24H    sodium chloride 0.9 % bolus 100 mL  100 mL IntraVENous ONCE PRN    sodium chloride flush 0.9 % injection 10 mL  10 mL IntraVENous ONCE PRN    aspirin tablet 325 mg  325 mg Oral Daily    famotidine (PEPCID) tablet 20 mg  20 mg Oral BID    sodium chloride flush 0.9 % injection 5-40 mL  5-40 mL IntraVENous 2 times per day    sodium chloride flush 0.9 % injection 5-40 mL  5-40 mL IntraVENous PRN    0.9 % sodium chloride infusion   IntraVENous PRN    enoxaparin (LOVENOX) injection 40 mg  40 mg SubCUTAneous Daily    ondansetron (ZOFRAN-ODT) disintegrating tablet 4 mg  4 mg Oral Q8H PRN    Or    ondansetron (ZOFRAN) injection 4 mg  4 mg IntraVENous Q6H PRN    polyethylene glycol (GLYCOLAX) packet 17 g  17 g Oral Daily PRN    acetaminophen (TYLENOL) tablet 650 mg  650 mg Oral Q6H PRN    Or    acetaminophen (TYLENOL) suppository 650 mg  650 mg Rectal Q6H PRN    potassium chloride 10 mEq/100 mL IVPB (Peripheral Line)  10 mEq IntraVENous PRN    magnesium sulfate 2000 mg in 50 mL IVPB premix  2,000 mg IntraVENous PRN    aluminum & magnesium hydroxide-simethicone (MAALOX) 200-200-20 MG/5ML suspension 30 mL  30 mL Oral Q6H PRN    HYDROcodone-acetaminophen (NORCO) 5-325 MG per tablet 1 tablet  1 tablet Oral Q6H PRN    ketorolac (TORADOL) injection 15 mg  15 mg IntraVENous Q6H PRN    HYDROmorphone HCl PF (DILAUDID) injection 0.25 mg  0.25 mg IntraVENous Q4H PRN    cefTRIAXone (ROCEPHIN) 1,000 mg in sodium chloride 0.9 % 50 mL IVPB (mini-bag)  1,000 mg IntraVENous Q24H    baclofen (LIORESAL) tablet 5 mg  5 mg Oral BID       Signed:  Josefina Brown MD    Part of this note may have been written by using a voice dictation software. The note has been proof read but may still contain some grammatical/other typographical errors.

## 2023-08-09 PROCEDURE — 96372 THER/PROPH/DIAG INJ SC/IM: CPT

## 2023-08-09 PROCEDURE — 97112 NEUROMUSCULAR REEDUCATION: CPT

## 2023-08-09 PROCEDURE — 97530 THERAPEUTIC ACTIVITIES: CPT

## 2023-08-09 PROCEDURE — 6370000000 HC RX 637 (ALT 250 FOR IP): Performed by: INTERNAL MEDICINE

## 2023-08-09 PROCEDURE — 97165 OT EVAL LOW COMPLEX 30 MIN: CPT

## 2023-08-09 PROCEDURE — 6370000000 HC RX 637 (ALT 250 FOR IP): Performed by: HOSPITALIST

## 2023-08-09 PROCEDURE — 2580000003 HC RX 258: Performed by: INTERNAL MEDICINE

## 2023-08-09 PROCEDURE — 2580000003 HC RX 258: Performed by: HOSPITALIST

## 2023-08-09 PROCEDURE — 1100000000 HC RM PRIVATE

## 2023-08-09 PROCEDURE — 96366 THER/PROPH/DIAG IV INF ADDON: CPT

## 2023-08-09 PROCEDURE — 6360000002 HC RX W HCPCS: Performed by: INTERNAL MEDICINE

## 2023-08-09 PROCEDURE — 96376 TX/PRO/DX INJ SAME DRUG ADON: CPT

## 2023-08-09 PROCEDURE — 6360000002 HC RX W HCPCS: Performed by: HOSPITALIST

## 2023-08-09 RX ADMIN — ONDANSETRON 4 MG: 4 TABLET, ORALLY DISINTEGRATING ORAL at 18:37

## 2023-08-09 RX ADMIN — KETOROLAC TROMETHAMINE 15 MG: 30 INJECTION, SOLUTION INTRAMUSCULAR at 01:05

## 2023-08-09 RX ADMIN — SODIUM CHLORIDE, PRESERVATIVE FREE 5 ML: 5 INJECTION INTRAVENOUS at 11:00

## 2023-08-09 RX ADMIN — SODIUM CHLORIDE, PRESERVATIVE FREE 5 ML: 5 INJECTION INTRAVENOUS at 20:30

## 2023-08-09 RX ADMIN — ASPIRIN 325 MG: 325 TABLET, FILM COATED ORAL at 09:52

## 2023-08-09 RX ADMIN — METRONIDAZOLE 500 MG: 500 TABLET ORAL at 22:04

## 2023-08-09 RX ADMIN — ENOXAPARIN SODIUM 40 MG: 100 INJECTION SUBCUTANEOUS at 09:51

## 2023-08-09 RX ADMIN — BACLOFEN 5 MG: 10 TABLET ORAL at 20:30

## 2023-08-09 RX ADMIN — METRONIDAZOLE 500 MG: 500 TABLET ORAL at 05:30

## 2023-08-09 RX ADMIN — FAMOTIDINE 20 MG: 20 TABLET ORAL at 09:52

## 2023-08-09 RX ADMIN — HYDROCODONE BITARTRATE AND ACETAMINOPHEN 1 TABLET: 5; 325 TABLET ORAL at 22:25

## 2023-08-09 RX ADMIN — BACLOFEN 5 MG: 10 TABLET ORAL at 09:52

## 2023-08-09 RX ADMIN — HYDROCODONE BITARTRATE AND ACETAMINOPHEN 1 TABLET: 5; 325 TABLET ORAL at 05:31

## 2023-08-09 RX ADMIN — FAMOTIDINE 20 MG: 20 TABLET ORAL at 20:30

## 2023-08-09 RX ADMIN — KETOROLAC TROMETHAMINE 15 MG: 30 INJECTION, SOLUTION INTRAMUSCULAR at 18:37

## 2023-08-09 RX ADMIN — VANCOMYCIN HYDROCHLORIDE 1250 MG: 10 INJECTION, POWDER, LYOPHILIZED, FOR SOLUTION INTRAVENOUS at 10:58

## 2023-08-09 RX ADMIN — CEFTRIAXONE 1000 MG: 1 INJECTION, POWDER, FOR SOLUTION INTRAMUSCULAR; INTRAVENOUS at 10:58

## 2023-08-09 RX ADMIN — METRONIDAZOLE 500 MG: 500 TABLET ORAL at 14:10

## 2023-08-09 RX ADMIN — KETOROLAC TROMETHAMINE 15 MG: 30 INJECTION, SOLUTION INTRAMUSCULAR at 10:59

## 2023-08-09 ASSESSMENT — PAIN SCALES - GENERAL
PAINLEVEL_OUTOF10: 6
PAINLEVEL_OUTOF10: 5
PAINLEVEL_OUTOF10: 0
PAINLEVEL_OUTOF10: 0
PAINLEVEL_OUTOF10: 5
PAINLEVEL_OUTOF10: 4
PAINLEVEL_OUTOF10: 0
PAINLEVEL_OUTOF10: 0

## 2023-08-09 ASSESSMENT — PAIN DESCRIPTION - ORIENTATION
ORIENTATION: POSTERIOR;LEFT

## 2023-08-09 ASSESSMENT — PAIN DESCRIPTION - DESCRIPTORS
DESCRIPTORS: DISCOMFORT;SORE
DESCRIPTORS: SORE;DISCOMFORT
DESCRIPTORS: DISCOMFORT;SORE
DESCRIPTORS: SORE;DISCOMFORT

## 2023-08-09 ASSESSMENT — PAIN DESCRIPTION - LOCATION
LOCATION: SACRUM;FOOT
LOCATION: SACRUM
LOCATION: SACRUM;FOOT
LOCATION: SACRUM;FOOT

## 2023-08-09 ASSESSMENT — PAIN DESCRIPTION - PAIN TYPE
TYPE: CHRONIC PAIN

## 2023-08-09 ASSESSMENT — PAIN DESCRIPTION - FREQUENCY
FREQUENCY: CONTINUOUS

## 2023-08-09 ASSESSMENT — PAIN DESCRIPTION - ONSET
ONSET: ON-GOING

## 2023-08-09 ASSESSMENT — PAIN - FUNCTIONAL ASSESSMENT
PAIN_FUNCTIONAL_ASSESSMENT: PREVENTS OR INTERFERES SOME ACTIVE ACTIVITIES AND ADLS
PAIN_FUNCTIONAL_ASSESSMENT: PREVENTS OR INTERFERES SOME ACTIVE ACTIVITIES AND ADLS
PAIN_FUNCTIONAL_ASSESSMENT: ACTIVITIES ARE NOT PREVENTED
PAIN_FUNCTIONAL_ASSESSMENT: PREVENTS OR INTERFERES SOME ACTIVE ACTIVITIES AND ADLS

## 2023-08-09 NOTE — PROGRESS NOTES
Hospitalist Progress Note   Admit Date:  2023  2:22 AM   Name:  Chioma Carvajal   Age:  78 y.o. Sex:  male  :  1955   MRN:  [id number]   Room:  701/    Presenting/Chief Complaint: Rectal Bleeding     Reason(s) for Admission: Cellulitis of gluteal region [I22.717]  MS (multiple sclerosis) ([address] ) [G35]  Cellulitis of skin [L03.90]  Skin ulcer of sacrum, unspecified ulcer stage [facility]) [address] Course:   Chioma Carvajal is a 78 y.o. male with medical history of   Multiple sclerosis    who presented with weakness and fatigue, lack of appetite, weight loss, chronic foot and sacral ulcers. His wound care and visiting nurse came to see him on the day of admission. At baseline he is bedbound. Recently patient does not have enough power to scoot onto his power wheelchair. He is mostly bedridden. He has had increased sacral pain. No fever. No chills. In the emergency room he was found to have elevated lactic acid, serum procalcitonin and leukocytosis. He was found to have sacral ulcer with surrounding cellulitis. He was started on IV fluid and antibiotics. CT pelvis with IV contrast shows;  1. Correlate for developing stercoral colitis. 2.  Correlate for constipation. Subjective & 24hr Events:     23   Patient reports no abdominal pain. He reports that the pain in the sacral area is improving. No fever since admission. No shortness of breath. No nausea, no vomiting. 23   Patient is resting in bed. Afebrile. 23   Patient is feeling OK. Afebrile. No shortness of breath. No chest pain. Assessment & Plan:     Principal Problem:    Cellulitis of skin    Decubitus ulcer    Elevated lactic acid level    Dehydration  Plan:   Continue with vancomycin and ceftriaxone. The fact that CT scan also read for possible colitis, I have added metronidazole.    This can help with potential organism causing sacral ulcer and

## 2023-08-09 NOTE — PLAN OF CARE
Problem: Discharge Planning  Goal: Discharge to home or other facility with appropriate resources  8/8/2023 2307 by Lulú Farris RN  Outcome: Progressing  8/8/2023 1144 by Isabela De La Cruz RN  Outcome: Progressing     Problem: Pain  Goal: Verbalizes/displays adequate comfort level or baseline comfort level  8/8/2023 2307 by Lulú Farris RN  Outcome: Progressing  8/8/2023 1144 by Isabela De La Cruz RN  Outcome: Progressing     Problem: Neurosensory - Adult  Goal: Achieves maximal functionality and self care  8/8/2023 2307 by Lulú Farris RN  Outcome: Progressing  8/8/2023 1144 by Isabela De La Cruz RN  Outcome: Progressing     Problem: Respiratory - Adult  Goal: Achieves optimal ventilation and oxygenation  8/8/2023 2307 by Lulú Farris RN  Outcome: Progressing  8/8/2023 1144 by Isabela De La Cruz RN  Outcome: Progressing     Problem: Cardiovascular - Adult  Goal: Maintains optimal cardiac output and hemodynamic stability  8/8/2023 2307 by Lulú Farris RN  Outcome: Progressing  8/8/2023 1144 by Isabela De La Cruz RN  Outcome: Progressing     Problem: Skin/Tissue Integrity - Adult  Goal: Skin integrity remains intact  8/8/2023 2307 by Lulú Farris RN  Outcome: Progressing  8/8/2023 1144 by Isabela De La Cruz RN  Outcome: Progressing  Goal: Incisions, wounds, or drain sites healing without S/S of infection  8/8/2023 2307 by Lulú Farris RN  Outcome: Progressing  8/8/2023 1144 by Isabela De La Cruz RN  Outcome: Progressing  Goal: Oral mucous membranes remain intact  8/8/2023 2307 by Lulú Farris RN  Outcome: Progressing  8/8/2023 1144 by Isabela De La Cruz RN  Outcome: Progressing     Problem: Musculoskeletal - Adult  Goal: Return ADL status to a safe level of function  8/8/2023 2307 by Lulú Farris RN  Outcome: Progressing  8/8/2023 1144 by Isabela De La Cruz RN  Outcome: Progressing     Problem: Gastrointestinal - Adult  Goal: Pt/Family able to verbalize concerns and demonstrate effective coping strategies  Description: INTERVENTIONS:  1. Assist patient/family to identify coping skills, available support systems and cultural and spiritual values  2. Provide emotional support, including active listening and acknowledgement of concerns of patient and caregivers  3. Reduce environmental stimuli, as able  4. Instruct patient/family in relaxation techniques, as appropriate  5.  Assess for spiritual pain/suffering and initiate Spiritual Care, Psychosocial Clinical Specialist consults as needed  8/8/2023 2307 by Danny Nichols RN  Outcome: Progressing  8/8/2023 1144 by London Griffin RN  Outcome: Progressing     Problem: Safety - Adult  Goal: Free from fall injury  8/8/2023 2307 by Danny Nichols RN  Outcome: Progressing  8/8/2023 1144 by London Griffin RN  Outcome: Progressing

## 2023-08-09 NOTE — PROGRESS NOTES
ACUTE OCCUPATIONAL THERAPY GOALS:   (Developed with and agreed upon by patient and/or caregiver.)  1. Patient will complete lower body bathing and dressing with MIN A and adaptive equipment as needed. 2.Patient will complete upper body bathing and dressing with CGA and adaptive equipment as needed. 3. Patient will complete toileting with MOD A.   4. Patient will tolerate 30 minutes of OT treatment with 1-2 rest breaks to increase activity tolerance for ADLs. 5. Patient will complete functional transfers with MOD A and adaptive equipment as needed. 6. Patient will complete simple grooming task sitting unsupported with SBA. Timeframe: 7 visits      OCCUPATIONAL THERAPY Initial Assessment and Daily Note       OT Visit Days: 1  Acknowledge Orders  Time  OT Charge Capture  Rehab Caseload Tracker      Osmar Jacobs is a 78 y.o. male   PRIMARY DIAGNOSIS: Cellulitis of skin  Cellulitis of gluteal region [S47.304]  MS (multiple sclerosis) ([address]) [G35]  Cellulitis of skin [L03.90]  Skin ulcer of sacrum, unspecified ulcer stage ([address]) [F35.478]       Reason for Referral: Generalized Muscle Weakness (M62.81)  Other lack of cordination (R27.8)  Difficulty in walking, Not elsewhere classified (R26.2)  Inpatient: Payor: Enrrique Carlton / Plan: [employer] GOLD PLUS HMO / Product Type: *No Product type* /     ASSESSMENT:     REHAB RECOMMENDATIONS:   Recommendation to date pending progress:  Setting:  Short-term Rehab    Equipment:    To Be Determined     ASSESSMENT:  Mr. Anita Gross presented to the hospital with weakness, fatigue, and foot/sacral ulcers. At baseline, pt lives with friends. Pt reported he is typically able to perform bADLs, bed mobility, and transfers mod I. Appears he spends a lot of time in the bed at home. If out of bed, pt uses an electric wheelchair. Today, pt was received supine in bed. Completed bed mobility with modA x2. MaxA for LB dressing, Justine for UB dressing.  CGA for simple grooming task and prepare for self care. .     TREATMENT GRID:  N/A    AFTER TREATMENT PRECAUTIONS: Call light within reach, Chair, Needs within reach, and RN notified    INTERDISCIPLINARY COLLABORATION:  RN/ PCT, PT/ PTA, and OT/ LIMON    EDUCATION:  Education Given To: Patient  Education Provided: Role of Therapy;Plan of Care; Fall Prevention Strategies  Education Outcome: Verbalized understanding;Demonstrated understanding    TOTAL TREATMENT DURATION AND TIME:  Time In: 0196  Time Out: [facility]  Minutes: [address] [facility], OT

## 2023-08-09 NOTE — PLAN OF CARE
Problem: Discharge Planning  Goal: Discharge to home or other facility with appropriate resources  Outcome: Progressing     Problem: Pain  Goal: Verbalizes/displays adequate comfort level or baseline comfort level  Outcome: Progressing     Problem: Neurosensory - Adult  Goal: Achieves maximal functionality and self care  Outcome: Progressing     Problem: Respiratory - Adult  Goal: Achieves optimal ventilation and oxygenation  Outcome: Progressing     Problem: Cardiovascular - Adult  Goal: Maintains optimal cardiac output and hemodynamic stability  Outcome: Progressing     Problem: Skin/Tissue Integrity - Adult  Goal: Skin integrity remains intact  Outcome: Progressing  Flowsheets (Taken 8/9/2023 0830)  Skin Integrity Remains Intact: Monitor for areas of redness and/or skin breakdown  Goal: Incisions, wounds, or drain sites healing without S/S of infection  Outcome: Progressing  Flowsheets (Taken 8/9/2023 0830)  Incisions, Wounds, or Drain Sites Healing Without Sign and Symptoms of Infection: ADMISSION and DAILY: Assess and document risk factors for pressure ulcer development  Goal: Oral mucous membranes remain intact  Outcome: Progressing  Flowsheets (Taken 8/9/2023 0830)  Oral Mucous Membranes Remain Intact: Assess oral mucosa and hygiene practices     Problem: Musculoskeletal - Adult  Goal: Return ADL status to a safe level of function  Outcome: Progressing     Problem: Gastrointestinal - Adult  Goal: Maintains or returns to baseline bowel function  Outcome: Progressing  Goal: Maintains adequate nutritional intake  Outcome: Progressing     Problem: Genitourinary - Adult  Goal: Absence of urinary retention  Outcome: Progressing     Problem: Infection - Adult  Goal: Absence of infection at discharge  Outcome: Progressing  Goal: Absence of infection during hospitalization  Outcome: Progressing     Problem: Metabolic/Fluid and Electrolytes - Adult  Goal: Electrolytes maintained within normal limits  Outcome: Progressing  Goal: Hemodynamic stability and optimal renal function maintained  Outcome: Progressing     Problem: Hematologic - Adult  Goal: Maintains hematologic stability  Outcome: Progressing     Problem: Anxiety  Goal: Will report anxiety at manageable levels  Description: INTERVENTIONS:  1. Administer medication as ordered  2. Teach and rehearse alternative coping skills  3. Provide emotional support with 1:1 interaction with staff  Outcome: Progressing     Problem: Coping  Goal: Pt/Family able to verbalize concerns and demonstrate effective coping strategies  Description: INTERVENTIONS:  1. Assist patient/family to identify coping skills, available support systems and cultural and spiritual values  2. Provide emotional support, including active listening and acknowledgement of concerns of patient and caregivers  3. Reduce environmental stimuli, as able  4. Instruct patient/family in relaxation techniques, as appropriate  5.  Assess for spiritual pain/suffering and initiate Spiritual Care, Psychosocial Clinical Specialist consults as needed  Outcome: Progressing     Problem: Safety - Adult  Goal: Free from fall injury  Outcome: Progressing

## 2023-08-09 NOTE — PROGRESS NOTES
Physician Progress Note      Devante Mulligan  CSN #:                  [id number]  :                       1955  ADMIT DATE:       2023 2:22 AM  DISCH DATE:  RESPONDING  PROVIDER #:        Tsering Daley MD          QUERY TEXT:    Patient admitted with sepsis. Noted documentation of severe protein-calorie   malnutrition by physicians and moderate malnutrition by RD in  progress   note. If possible, please document in progress notes and discharge summary if you   are evaluating and /or treating any of the following: The medical record reflects the following:  Risk Factors: 78 y.o. functional quad with MS, BMI 20.73  Clinical Indicators: Per H&P \"Severe Protein Calorie Malnutrition with   sarcopenia\"  Per  RD note \"Moderate malnutrition  Context: Chronic Illness  Findings of clinical characteristics of malnutrition:  Energy Intake:  Mild decrease in energy intake (Comment) (pt reports as of the   last few days he has had a decline in appetite.)  Weight Loss:  Unable to assess (Pt does not have an accurate weight history in   epic, with all stated weights recorded at 130 lbs - and was unable to provide   an accurate weight history.)  Body Fat Loss:  Mild body fat loss Orbital, Triceps, Buccal region  Muscle Mass Loss:  Mild muscle mass loss Temples (temporalis), Hand   (interosseous)\"  Treatment: RD consult, Continue Current Diet, Modify Oral Nutrition Supplement    Thank you,  Lauren Duong RN, BSN, CDI, [employer]. [email or web address]  .   Options provided:  -- severe protein-calorie malnutrition confirmed, and moderate malnutrition   ruled out  -- moderate malnutrition confirmed, and severe protein-calorie malnutrition   ruled out  -- Other - I will add my own diagnosis  -- Disagree - Not applicable / Not valid  -- Disagree - Clinically unable to determine / Unknown  -- Refer to Clinical Documentation Reviewer    PROVIDER RESPONSE TEXT:    After study,

## 2023-08-09 NOTE — PROGRESS NOTES
ACUTE PHYSICAL THERAPY GOALS:   (Developed with and agreed upon by patient and/or caregiver.)  (Developed with and agreed upon by patient and/or caregiver.)  (1.) Bacilio Guerrero  will move from supine to sit and sit to supine , scoot up and down, and roll side to side with CONTACT GUARD ASSIST within 7 treatment day(s). (2.) Bacilio Guerrero will transfer from bed to chair and chair to bed with CONTACT GUARD ASSIST using the least restrictive device within 7 treatment day(s). (3.) Bacilio Guerrero will perform sitting static and dynamic balance activities x 20 minutes with CONTACT GUARD ASSIST to improve safety within 7 treatment day(s). (4.) Bacilio Guerrero will perform standing static and dynamic balance activities x 5 minutes with CONTACT GUARD ASSIST to improve safety within 7 treatment day(s). (5.) Bacilio Guerrero will perform therapeutic exercises x 20 min for HEP with CONTACT GUARD ASSIST to improve strength, endurance, and functional mobility within 7 treatment day(s). PHYSICAL THERAPY: Daily Note AM   (Link to Caseload Tracking: PT Visit Days : 2  Time In/Out PT Charge Capture  Rehab Caseload Tracker  Orders    Bacilio Guerrero is a 78 y.o. male   PRIMARY DIAGNOSIS: Cellulitis of skin  Cellulitis of gluteal region [L71.783]  MS (multiple sclerosis) ([address]) [G35]  Cellulitis of skin [L03.90]  Skin ulcer of sacrum, unspecified ulcer stage ([address]) [C14.911]       Inpatient: Payor: Shante Recinos / Plan: [employer] GOLD PLUS HMO / Product Type: *No Product type* /     ASSESSMENT:     REHAB RECOMMENDATIONS:   Recommendation to date pending progress:  Setting:  Short-term Rehab    Equipment:    To Be Determined     ASSESSMENT:  Mr. Courtney York was supine upon contact and agreeable to PT; motivated to participate. Of note, patient's LE's are contracted with RLE more than LLE. Patient performed supine to sit with min assist x 2 and cues for technique.  Patient then

## 2023-08-10 PROBLEM — L03.90 CELLULITIS: Status: ACTIVE | Noted: 2023-08-10

## 2023-08-10 PROCEDURE — 2580000003 HC RX 258: Performed by: INTERNAL MEDICINE

## 2023-08-10 PROCEDURE — 6360000002 HC RX W HCPCS: Performed by: INTERNAL MEDICINE

## 2023-08-10 PROCEDURE — 97530 THERAPEUTIC ACTIVITIES: CPT

## 2023-08-10 PROCEDURE — 96366 THER/PROPH/DIAG IV INF ADDON: CPT

## 2023-08-10 PROCEDURE — G0378 HOSPITAL OBSERVATION PER HR: HCPCS

## 2023-08-10 PROCEDURE — 6370000000 HC RX 637 (ALT 250 FOR IP): Performed by: HOSPITALIST

## 2023-08-10 PROCEDURE — 6370000000 HC RX 637 (ALT 250 FOR IP): Performed by: INTERNAL MEDICINE

## 2023-08-10 PROCEDURE — 2580000003 HC RX 258: Performed by: HOSPITALIST

## 2023-08-10 PROCEDURE — 96372 THER/PROPH/DIAG INJ SC/IM: CPT

## 2023-08-10 PROCEDURE — 6360000002 HC RX W HCPCS: Performed by: HOSPITALIST

## 2023-08-10 PROCEDURE — 97535 SELF CARE MNGMENT TRAINING: CPT

## 2023-08-10 RX ADMIN — BACLOFEN 5 MG: 10 TABLET ORAL at 20:21

## 2023-08-10 RX ADMIN — VANCOMYCIN HYDROCHLORIDE 750 MG: 750 INJECTION, POWDER, LYOPHILIZED, FOR SOLUTION INTRAVENOUS at 05:29

## 2023-08-10 RX ADMIN — VANCOMYCIN HYDROCHLORIDE 750 MG: 750 INJECTION, POWDER, LYOPHILIZED, FOR SOLUTION INTRAVENOUS at 16:50

## 2023-08-10 RX ADMIN — FAMOTIDINE 20 MG: 20 TABLET ORAL at 09:22

## 2023-08-10 RX ADMIN — METRONIDAZOLE 500 MG: 500 TABLET ORAL at 22:00

## 2023-08-10 RX ADMIN — METRONIDAZOLE 500 MG: 500 TABLET ORAL at 13:51

## 2023-08-10 RX ADMIN — HYDROCODONE BITARTRATE AND ACETAMINOPHEN 1 TABLET: 5; 325 TABLET ORAL at 20:21

## 2023-08-10 RX ADMIN — FAMOTIDINE 20 MG: 20 TABLET ORAL at 20:21

## 2023-08-10 RX ADMIN — BACLOFEN 5 MG: 10 TABLET ORAL at 09:22

## 2023-08-10 RX ADMIN — ASPIRIN 325 MG: 325 TABLET, FILM COATED ORAL at 09:22

## 2023-08-10 RX ADMIN — SODIUM CHLORIDE, PRESERVATIVE FREE 5 ML: 5 INJECTION INTRAVENOUS at 09:23

## 2023-08-10 RX ADMIN — CEFTRIAXONE 1000 MG: 1 INJECTION, POWDER, FOR SOLUTION INTRAMUSCULAR; INTRAVENOUS at 09:30

## 2023-08-10 RX ADMIN — METRONIDAZOLE 500 MG: 500 TABLET ORAL at 05:30

## 2023-08-10 RX ADMIN — ENOXAPARIN SODIUM 40 MG: 100 INJECTION SUBCUTANEOUS at 09:23

## 2023-08-10 ASSESSMENT — PAIN DESCRIPTION - ONSET: ONSET: GRADUAL

## 2023-08-10 ASSESSMENT — PAIN SCALES - GENERAL
PAINLEVEL_OUTOF10: 0
PAINLEVEL_OUTOF10: 5
PAINLEVEL_OUTOF10: 0

## 2023-08-10 ASSESSMENT — PAIN DESCRIPTION - LOCATION: LOCATION: BACK

## 2023-08-10 ASSESSMENT — PAIN DESCRIPTION - DESCRIPTORS: DESCRIPTORS: ACHING

## 2023-08-10 ASSESSMENT — PAIN DESCRIPTION - PAIN TYPE: TYPE: CHRONIC PAIN

## 2023-08-10 ASSESSMENT — PAIN - FUNCTIONAL ASSESSMENT: PAIN_FUNCTIONAL_ASSESSMENT: PREVENTS OR INTERFERES SOME ACTIVE ACTIVITIES AND ADLS

## 2023-08-10 ASSESSMENT — PAIN DESCRIPTION - ORIENTATION: ORIENTATION: MID

## 2023-08-10 ASSESSMENT — PAIN DESCRIPTION - FREQUENCY: FREQUENCY: INTERMITTENT

## 2023-08-10 NOTE — CARE COORDINATION
CM met with pt to discuss dc planning and the recommendation for STR at dc. Pt is in agreement with STR and expressed no preference of facility. He would prefer a facility that's in network with his insurance and is fairly close to his home so that his caregivers can visit. Referral submitted to [facility]. Second closest facilities are either [facility] and [facility]. Awaiting a response. Pt's insurance will require precert which CM will initiate once bed offer is received. CM following.

## 2023-08-10 NOTE — PLAN OF CARE
Problem: Discharge Planning  Goal: Discharge to home or other facility with appropriate resources  8/9/2023 2302 by Kevin Guillen RN  Outcome: Progressing  8/9/2023 1933 by Saundra Domínguez RN  Outcome: Progressing     Problem: Pain  Goal: Verbalizes/displays adequate comfort level or baseline comfort level  8/9/2023 2302 by Kevin Guillen RN  Outcome: Progressing  8/9/2023 1933 by Saundra Domínguez RN  Outcome: Progressing     Problem: Neurosensory - Adult  Goal: Achieves maximal functionality and self care  8/9/2023 2302 by Kevin Guillen RN  Outcome: Progressing  8/9/2023 1933 by Saundra Domínguez RN  Outcome: Progressing     Problem: Respiratory - Adult  Goal: Achieves optimal ventilation and oxygenation  8/9/2023 2302 by Kevin Guillen RN  Outcome: Progressing  8/9/2023 1933 by Saundra Domínguez RN  Outcome: Progressing     Problem: Cardiovascular - Adult  Goal: Maintains optimal cardiac output and hemodynamic stability  8/9/2023 2302 by Kevin Guillen RN  Outcome: Progressing  8/9/2023 1933 by Saundra Domínguez RN  Outcome: Progressing     Problem: Skin/Tissue Integrity - Adult  Goal: Skin integrity remains intact  8/9/2023 2302 by Kevin Guillen RN  Outcome: Progressing  8/9/2023 1933 by Saundra Domínguez RN  Outcome: Progressing  Flowsheets (Taken 8/9/2023 0830)  Skin Integrity Remains Intact: Monitor for areas of redness and/or skin breakdown  Goal: Incisions, wounds, or drain sites healing without S/S of infection  8/9/2023 2302 by Kevin Guillen RN  Outcome: Progressing  8/9/2023 1933 by Saundra Domínguez RN  Outcome: Progressing  Flowsheets (Taken 8/9/2023 0830)  Incisions, Wounds, or Drain Sites Healing Without Sign and Symptoms of Infection: ADMISSION and DAILY: Assess and document risk factors for pressure ulcer development  Goal: Oral mucous membranes remain intact  8/9/2023 2302 by Kevin Guillen RN  Outcome: Progressing  8/9/2023 1933 by Lulu Jones RN  Outcome: Progressing  Flowsheets (Taken 8/9/2023 0830)  Oral Mucous Membranes Remain Intact: Assess oral mucosa and hygiene practices     Problem: Musculoskeletal - Adult  Goal: Return ADL status to a safe level of function  8/9/2023 2302 by Kaia Montana RN  Outcome: Progressing  8/9/2023 1933 by Lulu Jones RN  Outcome: Progressing     Problem: Gastrointestinal - Adult  Goal: Maintains or returns to baseline bowel function  8/9/2023 2302 by Kaia Montana RN  Outcome: Progressing  8/9/2023 1933 by Lulu Jones RN  Outcome: Progressing  Goal: Maintains adequate nutritional intake  8/9/2023 2302 by Kaia Montana RN  Outcome: Progressing  8/9/2023 1933 by Lulu Jones RN  Outcome: Progressing     Problem: Genitourinary - Adult  Goal: Absence of urinary retention  8/9/2023 2302 by Kaia Montana RN  Outcome: Progressing  8/9/2023 1933 by Lulu Jones RN  Outcome: Progressing     Problem: Infection - Adult  Goal: Absence of infection at discharge  8/9/2023 2302 by Kaia Montana RN  Outcome: Progressing  8/9/2023 1933 by uLlu Jones RN  Outcome: Progressing  Goal: Absence of infection during hospitalization  8/9/2023 2302 by Kaia Montana RN  Outcome: Progressing  8/9/2023 1933 by Lluu Jones RN  Outcome: Progressing     Problem: Metabolic/Fluid and Electrolytes - Adult  Goal: Electrolytes maintained within normal limits  8/9/2023 2302 by Kaia Montana RN  Outcome: Progressing  8/9/2023 1933 by Lulu Jones RN  Outcome: Progressing  Goal: Hemodynamic stability and optimal renal function maintained  8/9/2023 2302 by Kaia Montana RN  Outcome: Progressing  8/9/2023 1933 by Lulu Jones RN  Outcome: Progressing     Problem: Hematologic - Adult  Goal: Maintains hematologic stability  8/9/2023 2302 by Kaia Montana RN  Outcome:

## 2023-08-10 NOTE — PROGRESS NOTES
ACUTE PHYSICAL THERAPY GOALS:   (Developed with and agreed upon by patient and/or caregiver.)  (Developed with and agreed upon by patient and/or caregiver.)  (1.) Bri Lopez  will move from supine to sit and sit to supine , scoot up and down, and roll side to side with CONTACT GUARD ASSIST within 7 treatment day(s). (2.) Bri Lopez will transfer from bed to chair and chair to bed with CONTACT GUARD ASSIST using the least restrictive device within 7 treatment day(s). (3.) Bri Lopez will perform sitting static and dynamic balance activities x 20 minutes with CONTACT GUARD ASSIST to improve safety within 7 treatment day(s). (4.) Bri Lopez will perform standing static and dynamic balance activities x 5 minutes with CONTACT GUARD ASSIST to improve safety within 7 treatment day(s). (5.) Bri Lopez will perform therapeutic exercises x 20 min for HEP with CONTACT GUARD ASSIST to improve strength, endurance, and functional mobility within 7 treatment day(s). PHYSICAL THERAPY: Daily Note PM   (Link to Caseload Tracking: PT Visit Days : 3  Time In/Out PT Charge Capture  Rehab Caseload Tracker  Orders    Bri Lopez is a 78 y.o. male   PRIMARY DIAGNOSIS: Cellulitis of skin  Cellulitis of gluteal region [B18.826]  MS (multiple sclerosis) ([address]) [G35]  Cellulitis of skin [L03.90]  Skin ulcer of sacrum, unspecified ulcer stage ([address]) [L98.429]  Cellulitis [L03.90]       Observation: Payor: HU[employer]NA MEDICARE / Plan: HU[employer]NA GOLD PLUS HMO / Product Type: *No Product type* /     ASSESSMENT:     REHAB RECOMMENDATIONS:   Recommendation to date pending progress:  Setting:  Short-term Rehab    Equipment:    To Be Determined     ASSESSMENT:  Mr. Casey Mireles is making slow progress toward goals with improved sitting balance and activity tolerance. He is close to meeting his sitting balance goals, but he did initially require min A to gain his balance.   He

## 2023-08-10 NOTE — PROGRESS NOTES
Comprehensive Nutrition Assessment    Type and Reason for Visit: Reassess  Malnutrition Screening Tool: Malnutrition Screen  Have you recently lost weight without trying?: Unsure of amount of weight loss (2 points)  Have you been eating poorly because of a decreased appetite?: No (0 points)  Malnutrition Screening Tool Score: 2    Nutrition Recommendations/Plan:   Meals and Snacks:  Diet: Continue current order  Nutrition Supplement Therapy:  Medical food supplement therapy:  Continue Ensure Enlive three times per day (this provides 350 kcal and 20 grams protein per bottle) and Kirk twice per day (this provides 90 kcal and 2.5 grams protein per packet)     Malnutrition Assessment:  Malnutrition Status: Moderate malnutrition  Context: Chronic Illness  Findings of clinical characteristics of malnutrition:   Energy Intake:  Mild decrease in energy intake (Comment) (pt reports as of the last few days he has had a decline in appetite.)  Weight Loss:  Unable to assess (Pt does not have an accurate weight history in epic, with all stated weights recorded at 130 lbs - and was unable to provide an accurate weight history.)     Body Fat Loss:  Mild body fat loss Orbital, Triceps, Buccal region   Muscle Mass Loss:  Mild muscle mass loss Temples (temporalis), Hand (interosseous)  Fluid Accumulation:  Unable to assess     Strength:  Not Performed     Nutrition Assessment:  Nutrition History: Pt reports recent poor PO intake at home, but is unable to quantify. Pt unaware of weight history, other than that his roommate has told him he has lost some weight. He believes this has been occurring over the last several months. Pt reports UBW of 130 lbs, bed weight today of 117 lbs. Weight is likely lower than this truly 2/2 pillows and materials on bed. Pt reports he is living in an apartment with friends and they will cook him food at times. States that he has 2-3 meals per day.  States he is working with insurance to get

## 2023-08-10 NOTE — PROGRESS NOTES
ACUTE OCCUPATIONAL THERAPY GOALS:   (Developed with and agreed upon by patient and/or caregiver.)      1. Patient will complete lower body bathing and dressing with MIN A and adaptive equipment as needed. 2.Patient will complete upper body bathing and dressing with CGA and adaptive equipment as needed. 3. Patient will complete toileting with MOD A.   4. Patient will tolerate 30 minutes of OT treatment with 1-2 rest breaks to increase activity tolerance for ADLs. 5. Patient will complete functional transfers with MOD A and adaptive equipment as needed. 6. Patient will complete simple grooming task sitting unsupported with SBA. OCCUPATIONAL THERAPY: Daily Note PM   OT Visit Days: 2   Time In/Out  OT Charge Capture  Rehab Caseload Tracker  OT Orders    Erick Dominguez is a 78 y.o. male   PRIMARY DIAGNOSIS: Cellulitis of skin  Cellulitis of gluteal region [F94.219]  MS (multiple sclerosis) ([address]) [G35]  Cellulitis of skin [L03.90]  Skin ulcer of sacrum, unspecified ulcer stage ([address]) [L98.429]  Cellulitis [L03.90]       Observation: Payor: [id number] MEDICARE / Plan: HUMANA GOLD PLUS HMO / Product Type: *No Product type* /     ASSESSMENT:     REHAB RECOMMENDATIONS: CURRENT LEVEL OF FUNCTION:  (Most Recently Demonstrated)   Recommendation to date pending progress:  Setting:  Short-term Rehab    Equipment:    To Be Determined Bathing:  Not Tested  Dressing:  Minimal Assist to don gown  Feeding/Grooming:  Minimal Assist  Toileting:  Not Tested  Functional Mobility:  Minimal Assist x 2     ASSESSMENT:  Mr. Trina Christian presents with decreased activity tolerance, decreased independence with self care and decreased independence with mobility. Pt has decreased gross and fine motor control of hands. Pt has a severely contracted RLE which interferes with independence for transfers. Pt participated in self care tasks with overall min/mod a. Good effort. Continue POC.        SUBJECTIVE:     Mr. Trina Christian states, Delisa Orn you for [] [] [x] [x] [] [] []    Personal Device Care [] [] [] [] [] [x] [x] [] [] []    Functional Mobility [] [] [] [] [] [x] [x] [] [] [] With bed mobility   I=Independent, Mod I=Modified Independent, S=Supervision/Setup, SBA=Standby Assistance, CGA=Contact Guard Assistance, Min=Minimal Assistance, Mod=Moderate Assistance, Max=Maximal Assistance, Total=Total Assistance, NT=Not Tested    BALANCE: Good Fair+ Fair Fair- Poor NT Comments   Sitting Static [] [] [x] [] [] []    Sitting Dynamic [] [] [x] [x] [] []              Standing Static [] [] [] [] [] [x]    Standing Dynamic [] [] [] [] [] [x]        PLAN:     FREQUENCY/DURATION   OT Plan of Care: 3 times/week for duration of hospital stay or until stated goals are met, whichever comes first.    TREATMENT:     TREATMENT:   Co-Treatment PT/OT necessary due to patient's decreased overall endurance/tolerance levels, as well as need for high level skilled assistance to complete functional transfers/mobility and functional tasks  Self Care (39 minutes): Patient participated in upper body dressing, lower body dressing, personal device care, and grooming ADLs in unsupported sitting with moderate visual, verbal, manual, and tactile cueing to increase independence, decrease assistance required, and increase activity tolerance. Patient also participated in functional mobility, functional transfer, and energy conservation training to increase independence, decrease assistance required, increase activity tolerance, and increase safety awareness.      TREATMENT GRID:  N/A    AFTER TREATMENT PRECAUTIONS: Alarm Activated, Bed, Bed/Chair Locked, Call light within reach, Heels floated, Needs within reach, RN notified, and Side rails x2    INTERDISCIPLINARY COLLABORATION:  RN/ PCT, PT/ PTA, and OT/ LIMON    EDUCATION:       TOTAL TREATMENT DURATION AND TIME:  Time In: 1425  Time Out: [address]  Minutes: [address] [facility]  [address], [place]

## 2023-08-10 NOTE — PROGRESS NOTES
Hospitalist Progress Note   Admit Date:  2023  2:22 AM   Name:  Ellen Swenson   Age:  78 y.o. Sex:  male  :  1955   MRN:  [id number]   Room:  701/    Presenting/Chief Complaint: Rectal Bleeding     Reason(s) for Admission: Cellulitis of gluteal region [Z08.580]  MS (multiple sclerosis) ([address] [facility]) [G35]  Cellulitis of skin [L03.90]  Skin ulcer of sacrum, unspecified ulcer stage [facility]) [address] Course:   Ellen Swenson is a 78 y.o. male with medical history of   Multiple sclerosis    who presented with weakness and fatigue, lack of appetite, weight loss, chronic foot and sacral ulcers. His wound care and visiting nurse came to see him on the day of admission. At baseline he is bedbound. Recently patient does not have enough power to scoot onto his power wheelchair. He is mostly bedridden. He has had increased sacral pain. No fever. No chills. In the emergency room he was found to have elevated lactic acid, serum procalcitonin and leukocytosis. He was found to have sacral ulcer with surrounding cellulitis. He was started on IV fluid and antibiotics. CT pelvis with IV contrast shows;  1. Correlate for developing stercoral colitis. 2.  Correlate for constipation. Patient is on vancomycin, ceftriaxone, and metronidazole. Surgical service is consulted to see the patient and cellulitis. No intervention is recommended. Wound care continued to help with sacral wound management. Subjective & 24hr Events:     8/10/23   Patient is seen and examined at bedside. No fever. No shaking. No chills. Patient is eating well. No shortness of breath. Assessment & Plan:     Principal Problem:    Cellulitis of skin    Decubitus ulcer    Elevated lactic acid level    Dehydration  Plan:   Continue with vancomycin and ceftriaxone. The fact that CT scan also read for possible colitis, I have added metronidazole.    This can help with potential mL  100 mL IntraVENous ONCE PRN    sodium chloride flush 0.9 % injection 10 mL  10 mL IntraVENous ONCE PRN    aspirin tablet 325 mg  325 mg Oral Daily    famotidine (PEPCID) tablet 20 mg  20 mg Oral BID    sodium chloride flush 0.9 % injection 5-40 mL  5-40 mL IntraVENous 2 times per day    sodium chloride flush 0.9 % injection 5-40 mL  5-40 mL IntraVENous PRN    0.9 % sodium chloride infusion   IntraVENous PRN    enoxaparin (LOVENOX) injection 40 mg  40 mg SubCUTAneous Daily    ondansetron (ZOFRAN-ODT) disintegrating tablet 4 mg  4 mg Oral Q8H PRN    Or    ondansetron (ZOFRAN) injection 4 mg  4 mg IntraVENous Q6H PRN    polyethylene glycol (GLYCOLAX) packet 17 g  17 g Oral Daily PRN    acetaminophen (TYLENOL) tablet 650 mg  650 mg Oral Q6H PRN    Or    acetaminophen (TYLENOL) suppository 650 mg  650 mg Rectal Q6H PRN    potassium chloride 10 mEq/100 mL IVPB (Peripheral Line)  10 mEq IntraVENous PRN    magnesium sulfate 2000 mg in 50 mL IVPB premix  2,000 mg IntraVENous PRN    aluminum & magnesium hydroxide-simethicone (MAALOX) 200-200-20 MG/5ML suspension 30 mL  30 mL Oral Q6H PRN    HYDROcodone-acetaminophen (NORCO) 5-325 MG per tablet 1 tablet  1 tablet Oral Q6H PRN    ketorolac (TORADOL) injection 15 mg  15 mg IntraVENous Q6H PRN    HYDROmorphone HCl PF (DILAUDID) injection 0.25 mg  0.25 mg IntraVENous Q4H PRN    cefTRIAXone (ROCEPHIN) 1,000 mg in sodium chloride 0.9 % 50 mL IVPB (mini-bag)  1,000 mg IntraVENous Q24H    baclofen (LIORESAL) tablet 5 mg  5 mg Oral BID       Signed:  Aden Shields MD    Part of this note may have been written by using a voice dictation software. The note has been proof read but may still contain some grammatical/other typographical errors.

## 2023-08-11 VITALS
BODY MASS INDEX: 20.27 KG/M2 | SYSTOLIC BLOOD PRESSURE: 129 MMHG | HEIGHT: 63 IN | WEIGHT: 114.4 LBS | DIASTOLIC BLOOD PRESSURE: 63 MMHG | RESPIRATION RATE: 16 BRPM | TEMPERATURE: 97.9 F | HEART RATE: 78 BPM | OXYGEN SATURATION: 93 %

## 2023-08-11 LAB
BACTERIA SPEC CULT: NORMAL
BACTERIA SPEC CULT: NORMAL
SERVICE CMNT-IMP: NORMAL
SERVICE CMNT-IMP: NORMAL

## 2023-08-11 PROCEDURE — 2580000003 HC RX 258: Performed by: HOSPITALIST

## 2023-08-11 PROCEDURE — 97112 NEUROMUSCULAR REEDUCATION: CPT

## 2023-08-11 PROCEDURE — 6370000000 HC RX 637 (ALT 250 FOR IP): Performed by: INTERNAL MEDICINE

## 2023-08-11 PROCEDURE — 76937 US GUIDE VASCULAR ACCESS: CPT

## 2023-08-11 PROCEDURE — 96372 THER/PROPH/DIAG INJ SC/IM: CPT

## 2023-08-11 PROCEDURE — 97530 THERAPEUTIC ACTIVITIES: CPT

## 2023-08-11 PROCEDURE — 97535 SELF CARE MNGMENT TRAINING: CPT

## 2023-08-11 PROCEDURE — 6360000002 HC RX W HCPCS: Performed by: INTERNAL MEDICINE

## 2023-08-11 PROCEDURE — 2580000003 HC RX 258: Performed by: INTERNAL MEDICINE

## 2023-08-11 PROCEDURE — 6370000000 HC RX 637 (ALT 250 FOR IP): Performed by: HOSPITALIST

## 2023-08-11 PROCEDURE — G0378 HOSPITAL OBSERVATION PER HR: HCPCS

## 2023-08-11 PROCEDURE — 96366 THER/PROPH/DIAG IV INF ADDON: CPT

## 2023-08-11 PROCEDURE — 6360000002 HC RX W HCPCS: Performed by: HOSPITALIST

## 2023-08-11 RX ORDER — BACLOFEN 5 MG/1
5 TABLET ORAL 2 TIMES DAILY
Qty: 30 TABLET | Refills: 0
Start: 2023-08-11

## 2023-08-11 RX ORDER — AMOXICILLIN AND CLAVULANATE POTASSIUM 875; 125 MG/1; MG/1
1 TABLET, FILM COATED ORAL EVERY 12 HOURS SCHEDULED
Qty: 10 TABLET | Refills: 0
Start: 2023-08-11 | End: 2023-08-16

## 2023-08-11 RX ORDER — HYDROCODONE BITARTRATE AND ACETAMINOPHEN 5; 325 MG/1; MG/1
1 TABLET ORAL EVERY 6 HOURS PRN
Qty: 12 TABLET | Refills: 0 | Status: SHIPPED | OUTPATIENT
Start: 2023-08-11 | End: 2023-08-14

## 2023-08-11 RX ORDER — AMOXICILLIN AND CLAVULANATE POTASSIUM 875; 125 MG/1; MG/1
1 TABLET, FILM COATED ORAL EVERY 12 HOURS SCHEDULED
Status: DISCONTINUED | OUTPATIENT
Start: 2023-08-11 | End: 2023-08-11 | Stop reason: HOSPADM

## 2023-08-11 RX ORDER — IPRATROPIUM BROMIDE AND ALBUTEROL SULFATE 2.5; .5 MG/3ML; MG/3ML
3 SOLUTION RESPIRATORY (INHALATION) EVERY 4 HOURS PRN
Qty: 360 ML | Refills: 0
Start: 2023-08-11

## 2023-08-11 RX ADMIN — VANCOMYCIN HYDROCHLORIDE 750 MG: 750 INJECTION, POWDER, LYOPHILIZED, FOR SOLUTION INTRAVENOUS at 05:29

## 2023-08-11 RX ADMIN — ENOXAPARIN SODIUM 40 MG: 100 INJECTION SUBCUTANEOUS at 09:30

## 2023-08-11 RX ADMIN — ACETAMINOPHEN 650 MG: 325 TABLET ORAL at 09:45

## 2023-08-11 RX ADMIN — SODIUM CHLORIDE, PRESERVATIVE FREE 5 ML: 5 INJECTION INTRAVENOUS at 10:58

## 2023-08-11 RX ADMIN — BACLOFEN 5 MG: 10 TABLET ORAL at 09:44

## 2023-08-11 RX ADMIN — FAMOTIDINE 20 MG: 20 TABLET ORAL at 09:45

## 2023-08-11 RX ADMIN — ASPIRIN 325 MG: 325 TABLET, FILM COATED ORAL at 09:45

## 2023-08-11 RX ADMIN — CEFTRIAXONE 1000 MG: 1 INJECTION, POWDER, FOR SOLUTION INTRAMUSCULAR; INTRAVENOUS at 09:46

## 2023-08-11 RX ADMIN — AMOXICILLIN AND CLAVULANATE POTASSIUM 1 TABLET: 875; 125 TABLET, FILM COATED ORAL at 12:09

## 2023-08-11 RX ADMIN — METRONIDAZOLE 500 MG: 500 TABLET ORAL at 05:30

## 2023-08-11 ASSESSMENT — PAIN SCALES - GENERAL
PAINLEVEL_OUTOF10: 3
PAINLEVEL_OUTOF10: 0

## 2023-08-11 ASSESSMENT — PAIN DESCRIPTION - ORIENTATION: ORIENTATION: RIGHT

## 2023-08-11 ASSESSMENT — PAIN DESCRIPTION - DESCRIPTORS: DESCRIPTORS: ACHING

## 2023-08-11 ASSESSMENT — PAIN DESCRIPTION - FREQUENCY: FREQUENCY: INTERMITTENT

## 2023-08-11 ASSESSMENT — PAIN DESCRIPTION - LOCATION: LOCATION: FOOT

## 2023-08-11 ASSESSMENT — PAIN - FUNCTIONAL ASSESSMENT: PAIN_FUNCTIONAL_ASSESSMENT: PREVENTS OR INTERFERES SOME ACTIVE ACTIVITIES AND ADLS

## 2023-08-11 NOTE — PROGRESS NOTES
ACUTE PHYSICAL THERAPY GOALS:   (Developed with and agreed upon by patient and/or caregiver.)  (Developed with and agreed upon by patient and/or caregiver.)  (1.) Damion Palma  will move from supine to sit and sit to supine , scoot up and down, and roll side to side with CONTACT GUARD ASSIST within 7 treatment day(s). (2.) Damion Palma will transfer from bed to chair and chair to bed with CONTACT GUARD ASSIST using the least restrictive device within 7 treatment day(s). (3.) Damion Palma will perform sitting static and dynamic balance activities x 20 minutes with CONTACT GUARD ASSIST to improve safety within 7 treatment day(s). (4.) Damion Palma will perform standing static and dynamic balance activities x 5 minutes with CONTACT GUARD ASSIST to improve safety within 7 treatment day(s). (5.) Damion Palma will perform therapeutic exercises x 20 min for HEP with CONTACT GUARD ASSIST to improve strength, endurance, and functional mobility within 7 treatment day(s). PHYSICAL THERAPY: Daily Note AM   (Link to Caseload Tracking: PT Visit Days : 4  Time In/Out PT Charge Capture  Rehab Caseload Tracker  Orders    Damion Palma is a 78 y.o. male   PRIMARY DIAGNOSIS: Cellulitis of skin  Cellulitis of gluteal region [J07.522]  MS (multiple sclerosis) ([address]) [G35]  Cellulitis of skin [L03.90]  Skin ulcer of sacrum, unspecified ulcer stage ([address]) [L98.429]  Cellulitis [L03.90]       Observation: Payor: HU[employer]NA MEDICARE / Plan: HU[employer] GOLD PLUS HMO / Product Type: *No Product type* /     ASSESSMENT:     REHAB RECOMMENDATIONS:   Recommendation to date pending progress:  Setting:  Short-term Rehab    Equipment:    To Be Determined     ASSESSMENT:  Mr. Michi Oconnor is making slow progress toward goals. He worked on sitting balance and activity tolerance. He has a severe RLE contracture that he is unable  to mobilize without assistance.   He transferred to the chair

## 2023-08-11 NOTE — CARE COORDINATION
Pt has been accepted for STR admission to [address] [place] [address] request submitted and approval received. Regina Gorman reference # [id number]. Facility notified. Awaiting confirmation that they can accept the pt today.

## 2023-08-11 NOTE — ADT AUTH CERT
PREVIOUSLY DENIED FOR INPATIENT DOWNGRADED TO OBSERVATION  IP REF #  [id number]  DATES OF SERVICE 8/6/23-STILL IN HOUSE      PLEASE FAX  OR CALL BACK  AUTHORIZATION FOR OBSERVATION    PHONE # [phone] FAX # [phone]       ADT Related Orders (From admission, onward) Comment  Collapse                  Place in Observation Service  ONE TIME        Order Details Start Time Order ID Status   Authorizing Provider: Beverly Brown MD   Service: Medical   Level of Care: Acute   Service: Medical   Diagnosis: Cellulitis   Expected Length of Stay: 2   Admitting Provider: Beverly Brown MD   Attending Provider: Beverly Brown MD   Patient Class: Observation  08/10/23 1145 [id number] Completed      Question: Discharge Plan: Answer: Shayne Tanner (e.g. Adult Home, Nursing Home, etc.)

## 2023-08-11 NOTE — CARE COORDINATION
Insurance approval received. Pt was medically cleared for dc today and transferred to [facility] for [place] services. Transport provided by [employer].  Pt notified his friends/caregivers of his dc and transfer. 08/06/23 0554   Service Assessment   Patient Orientation Alert and Oriented   Cognition Alert   History Provided By Medical Record; Patient   Primary Caregiver Friend  (roomates help as needed.)   Support Systems Friends/Neighbors   Patient's Healthcare Decision Maker is: Legal Next of [address] [facility]   PCP Verified by [employer] Yes  ([address])   Last Visit to PCP Within last 3 months   Prior Functional Level Independent in ADLs/IADLs;Assistance with the following:;Mobility   Current Functional Level Independent in ADLs/IADLs;Assistance with the following:;Mobility   Can patient return to prior living arrangement No   Ability to make needs known: Good   Family able to assist with home care needs: Yes   Would you like for me to discuss the discharge plan with any other family members/significant others, and if so, who? No   Financial Resources Medicaid; Medicare   Social/Functional History   Lives With Friend(s)   Type of Home Apartment   Home Layout One level   Home Access Level entry   Bathroom Shower/Tub Walk-in shower   Bathroom Toilet Handicap height   Bathroom Equipment Grab bars in [address] [place] [address] Help From Friend(s)   ADL Assistance Independent   Homemaking Assistance Needs assistance   Ambulation Assistance Non-ambulatory   Transfer Assistance Independent   Active  No   Patient's  Info friends   Occupation Retired   Discharge Planning   Type of [address] [facility]  (to be determined)   Living Arrangements Friends   Current Services Prior To [employer] [facility]; Home Care;Durable [email or web address]   DME Ordered?  No   Potential Assistance Purchasing

## 2023-08-11 NOTE — PROGRESS NOTES
US Guided PIV access    Called for assistance with IV access. Ultrasound was used to find the vein which was compressible and does not have any features of an artery or nerve bundle. Skin was cleaned and disinfected prior to IV puncture. Under real-time ultrasound guidance peripheral access was obtained in the left forearm using 22 G 1.75\" Peripheral IV catheter x 1 attempt. Blood return was present and IV flushed without difficulty. IV dressing applied, no immediate complications noted, and patient tolerated the procedure well.     Ave Sandhoff, RN, VA-BC

## 2023-08-11 NOTE — PROGRESS NOTES
ACUTE OCCUPATIONAL THERAPY GOALS:   (Developed with and agreed upon by patient and/or caregiver.)      1. Patient will complete lower body bathing and dressing with MIN A and adaptive equipment as needed. 2.Patient will complete upper body bathing and dressing with CGA and adaptive equipment as needed. 3. Patient will complete toileting with MOD A.   4. Patient will tolerate 30 minutes of OT treatment with 1-2 rest breaks to increase activity tolerance for ADLs. 5. Patient will complete functional transfers with MOD A and adaptive equipment as needed. 6. Patient will complete simple grooming task sitting unsupported with SBA. OCCUPATIONAL THERAPY: Daily Note PM   OT Visit Days: 3   Time In/Out  OT Charge Capture  Rehab Caseload Tracker  OT Orders    Kelsey Alves is a 78 y.o. male   PRIMARY DIAGNOSIS: Cellulitis of skin  Cellulitis of gluteal region [Z13.751]  MS (multiple sclerosis) ([address]) [G35]  Cellulitis of skin [L03.90]  Skin ulcer of sacrum, unspecified ulcer stage ([address]) [L98.429]  Cellulitis [L03.90]       Observation: Payor: HUMANA MEDICARE / Plan: HUMANA GOLD PLUS HMO / Product Type: *No Product type* /     ASSESSMENT:     REHAB RECOMMENDATIONS: CURRENT LEVEL OF FUNCTION:  (Most Recently Demonstrated)   Recommendation to date pending progress:  Setting:  Short-term Rehab    Equipment:    To Be Determined Bathing:  Not Tested  Dressing:  Minimal Assist to don gown  Feeding/Grooming:  Minimal Assist  Toileting:  Not Tested  Functional Mobility:  Minimal Assist x 2     ASSESSMENT:  Mr. Darwin Herman continues to present with decreased activity tolerance, decreased independence with self care and decreased independence with mobility. Pt requires overall min/mod a x 2 with mobility and transfers and mod/max a with LB ADL's. Good effort. Continue POC. SUBJECTIVE:     Mr. Darwin Herman states, \"You ladies will never forget me. \"     Social/Functional Lives With: Friend(s)  Type of Home: [place] within reach, Heels floated, Needs within reach, RN notified, and Side rails x2    INTERDISCIPLINARY COLLABORATION:  RN/ PCT, PT/ PTA, and OT/ LIMON    EDUCATION:       TOTAL TREATMENT DURATION AND TIME:  Time In: 1005  Time Out: [address]  Minutes: [address] [facility], \[facility]\""

## 2023-08-11 NOTE — ADT AUTH CERT
PREVIOUSLY DENIED FOR INPATIENT DOWNGRADED TO OBSERVATION  IP REF #  [id number]  DATES OF SERVICE 8/6/23-STILL IN HOUSE      PLEASE FAX  OR CALL BACK  AUTHORIZATION FOR OBSERVATION    PHONE # [phone] FAX # [phone]

## 2023-09-05 PROBLEM — E86.0 DEHYDRATION: Status: RESOLVED | Noted: 2023-08-06 | Resolved: 2023-09-05

## 2023-10-16 NOTE — ED TRIAGE NOTES
Pt to ED via EMS from home for R leg opain/numbness/tingling, started about an hour ago. Pt has visible open wounds/ulcers on both lower legs and feet. EMS vitals: HR 90, /90, RR 24, spO2 96% RA, BGL 89, temp 97.7. show

## 2023-11-03 ENCOUNTER — HOSPITAL ENCOUNTER (OUTPATIENT)
Dept: WOUND CARE | Age: 68
Discharge: HOME | End: 2023-11-03
Payer: MEDICARE

## 2023-11-03 VITALS
WEIGHT: 114 LBS | SYSTOLIC BLOOD PRESSURE: 106 MMHG | HEIGHT: 63 IN | HEART RATE: 94 BPM | RESPIRATION RATE: 18 BRPM | BODY MASS INDEX: 20.2 KG/M2 | DIASTOLIC BLOOD PRESSURE: 90 MMHG | TEMPERATURE: 97.9 F

## 2023-11-03 DIAGNOSIS — I73.9 PAD (PERIPHERAL ARTERY DISEASE) (HCC): Chronic | ICD-10-CM

## 2023-11-03 DIAGNOSIS — I87.332 IDIOPATHIC CHRONIC VENOUS HYPERTENSION OF LEFT LOWER EXTREMITY WITH ULCER AND INFLAMMATION (HCC): Chronic | ICD-10-CM

## 2023-11-03 DIAGNOSIS — G35 MS (MULTIPLE SCLEROSIS) (HCC): Chronic | ICD-10-CM

## 2023-11-03 DIAGNOSIS — I87.331 IDIOPATHIC CHRONIC VENOUS HYPERTENSION OF RIGHT LOWER EXTREMITY WITH ULCER AND INFLAMMATION (HCC): Chronic | ICD-10-CM

## 2023-11-03 DIAGNOSIS — L03.116 CELLULITIS OF LEFT FOOT: Primary | ICD-10-CM

## 2023-11-03 PROCEDURE — 99213 OFFICE O/P EST LOW 20 MIN: CPT

## 2023-11-03 PROCEDURE — 99213 OFFICE O/P EST LOW 20 MIN: CPT | Performed by: FAMILY MEDICINE

## 2023-11-03 RX ORDER — GENTAMICIN SULFATE 1 MG/G
OINTMENT TOPICAL ONCE
OUTPATIENT
Start: 2023-11-03 | End: 2023-11-03

## 2023-11-03 RX ORDER — IBUPROFEN 200 MG
TABLET ORAL ONCE
OUTPATIENT
Start: 2023-11-03 | End: 2023-11-03

## 2023-11-03 RX ORDER — BETAMETHASONE DIPROPIONATE 0.05 %
OINTMENT (GRAM) TOPICAL ONCE
OUTPATIENT
Start: 2023-11-03 | End: 2023-11-03

## 2023-11-03 RX ORDER — LIDOCAINE HYDROCHLORIDE 20 MG/ML
JELLY TOPICAL ONCE
OUTPATIENT
Start: 2023-11-03 | End: 2023-11-03

## 2023-11-03 RX ORDER — GINSENG 100 MG
CAPSULE ORAL ONCE
OUTPATIENT
Start: 2023-11-03 | End: 2023-11-03

## 2023-11-03 RX ORDER — TRIAMCINOLONE ACETONIDE 1 MG/G
OINTMENT TOPICAL ONCE
OUTPATIENT
Start: 2023-11-03 | End: 2023-11-03

## 2023-11-03 RX ORDER — BACITRACIN ZINC AND POLYMYXIN B SULFATE 500; 1000 [USP'U]/G; [USP'U]/G
OINTMENT TOPICAL ONCE
OUTPATIENT
Start: 2023-11-03 | End: 2023-11-03

## 2023-11-03 RX ORDER — SODIUM CHLOR/HYPOCHLOROUS ACID 0.033 %
SOLUTION, IRRIGATION IRRIGATION ONCE
OUTPATIENT
Start: 2023-11-03 | End: 2023-11-03

## 2023-11-03 RX ORDER — LIDOCAINE 40 MG/G
CREAM TOPICAL ONCE
OUTPATIENT
Start: 2023-11-03 | End: 2023-11-03

## 2023-11-03 RX ORDER — LIDOCAINE HYDROCHLORIDE 20 MG/ML
JELLY TOPICAL ONCE
Status: COMPLETED | OUTPATIENT
Start: 2023-11-03 | End: 2023-11-03

## 2023-11-03 RX ORDER — LIDOCAINE 50 MG/G
OINTMENT TOPICAL ONCE
OUTPATIENT
Start: 2023-11-03 | End: 2023-11-03

## 2023-11-03 RX ORDER — CLOBETASOL PROPIONATE 0.5 MG/G
OINTMENT TOPICAL ONCE
OUTPATIENT
Start: 2023-11-03 | End: 2023-11-03

## 2023-11-03 RX ORDER — LIDOCAINE HYDROCHLORIDE 40 MG/ML
SOLUTION TOPICAL ONCE
OUTPATIENT
Start: 2023-11-03 | End: 2023-11-03

## 2023-11-03 RX ADMIN — LIDOCAINE HYDROCHLORIDE: 20 JELLY TOPICAL at 14:49

## 2023-11-03 NOTE — PROGRESS NOTES
imaging orders placed:  None     Prescription drug management: N/A     Discussion of management or test interpretation with N/A. Comorbid conditions affecting wound healing: As per PMH which was reviewed. Risk of complications and/or mortality of patient management: This patient has a moderate risk of morbidity and mortality from additional diagnostic testing or treatment. This is due to the above conditions affecting wound healing as well as patient and procedure risk factors. Education and discussion held with patient regarding these disease processes pertinent to wound(s). Other pertinent decisions include: minor surgery or procedures as below and hospitalization. The patient's diagnosis or treatment is  significantly limited by social determinants of health as noted by: financial limitations and nutritional resource limitations . Wound 06/19/23 Foot Right;Dorsal;Lateral #1 (Active)   Wound Image   11/03/23 1344   Wound Etiology Arterial 11/03/23 1344   Dressing Status Old drainage noted 11/03/23 1344   Wound Cleansed Cleansed with saline; Soap and water;Vashe 11/03/23 1344   Dressing/Treatment Honey alginate 11/03/23 1344   Wound Length (cm) 5 cm 11/03/23 1344   Wound Width (cm) 3 cm 11/03/23 1344   Wound Depth (cm) 0.1 cm 11/03/23 1344   Wound Surface Area (cm^2) 15 cm^2 11/03/23 1344   Change in Wound Size % (l*w) 16.67 11/03/23 1344   Wound Volume (cm^3) 1.5 cm^3 11/03/23 1344   Wound Healing % 17 11/03/23 1344   Post-Procedure Length (cm) 12 cm 06/26/23 1512   Post-Procedure Width (cm) 1.5 cm 06/26/23 1512   Post-Procedure Depth (cm) 0.1 cm 06/26/23 1512   Post-Procedure Surface Area (cm^2) 18 cm^2 06/26/23 1512   Post-Procedure Volume (cm^3) 1.8 cm^3 06/26/23 1512   Wound Assessment Eschar dry 11/03/23 1344   Drainage Amount Moderate (25-50%) 11/03/23 1344   Drainage Description Serosanguinous 11/03/23 1344   Odor None 11/03/23 1344   Laura-wound Assessment Cool 11/03/23 1344   Margins Attached

## 2023-11-03 NOTE — FLOWSHEET NOTE
11/03/23 1344   Right Leg Edema Point of Measurement   Leg circumference 28 cm   Ankle circumference 22 cm   Foot circumference 25 cm   Compression Therapy Compression not ordered   Left Leg Edema Point of Measurement   Leg circumference 29 cm   Ankle circumference 21 cm   Foot circumference 24 cm   Compression Therapy Compression not ordered   Wound 06/19/23 Foot Right;Dorsal;Lateral #1   Date First Assessed/Time First Assessed: 06/19/23 1508   Present on Hospital Admission: Yes  Wound Approximate Age at First Assessment (Weeks): 52 weeks  Primary Wound Type: Arterial Ulcer  Location: Foot  Wound Location Orientation: Right;Dorsal;Late. .. Wound Image    Wound Etiology Arterial   Dressing Status Old drainage noted   Wound Cleansed Cleansed with saline; Soap and water;Vashe   Dressing/Treatment Honey alginate   Wound Length (cm) 5 cm   Wound Width (cm) 3 cm   Wound Depth (cm) 0.1 cm   Wound Surface Area (cm^2) 15 cm^2   Change in Wound Size % (l*w) 16.67   Wound Volume (cm^3) 1.5 cm^3   Wound Healing % 17   Wound Assessment Eschar dry   Drainage Amount Moderate (25-50%)   Drainage Description Serosanguinous   Odor None   Laura-wound Assessment Cool   Margins Attached edges   Wound Thickness Description not for Pressure Injury Full thickness   Wound 06/19/23 Foot Left;Dorsal #2   Date First Assessed/Time First Assessed: 06/19/23 1509   Present on Hospital Admission: Yes  Wound Approximate Age at First Assessment (Weeks): 52 weeks  Primary Wound Type: Arterial Ulcer  Location: Foot  Wound Location Orientation: Left;Dorsal  Woun. .. Wound Image    Wound Etiology Arterial   Dressing Status Old drainage noted   Wound Cleansed Cleansed with saline; Soap and water;Vashe   Dressing/Treatment Honey alginate   Wound Length (cm) 5 cm   Wound Width (cm) 8 cm   Wound Depth (cm) 0.1 cm   Wound Surface Area (cm^2) 40 cm^2   Change in Wound Size % (l*w) -53.85   Wound Volume (cm^3) 4 cm^3   Wound Healing % -54   Wound Assessment

## 2023-11-03 NOTE — WOUND CARE
Discharge Instructions for  [address] [facility]  [address] [facility], [address] [place] [address]  Phone [phone]   Fax [phone]      NAME:  Calvin Benavides  YOB: 1955  MEDICAL RECORD NUMBER:  [id number]  DATE:  11/3/2023    Return Appointment:   2-3 weeks with Giovanni Deluna DO  Instructions:   Bilateral Feet  Cleanse wound and periwound with wound cleanser or normal saline. Vashe moistened gauze to wound bed. Xeroform- apply to wound bed. Cover with ABD  Wrap with Kerlix. Dressing change 3x weekly. Gerry Newell for wound assesments and wound care between patient appointments at wound center. Consult with Dr. Jeyson Nails. Should you experience increased redness, swelling, pain, foul odor, size of wound(s), or have a temperature over 101 degrees please contact the [address] [place] [address] Franklin Rios at [phone] or if after hours contact your primary care physician or go to the hospital emergency department. PLEASE NOTE: IF YOU ARE UNABLE TO OBTAIN WOUND SUPPLIES, CONTINUE TO USE THE SUPPLIES YOU HAVE AVAILABLE UNTIL YOU ARE ABLE TO REACH US. IT IS MOST IMPORTANT TO KEEP THE WOUND COVERED AT ALL TIMES.     Electronically signed Matty Garcia RN on 11/3/2023 at 2:33 PM

## 2023-11-21 ENCOUNTER — HOSPITAL ENCOUNTER (OUTPATIENT)
Dept: WOUND CARE | Age: 68
Discharge: HOME | End: 2023-11-21
Payer: MEDICARE

## 2023-11-21 VITALS
HEIGHT: 63 IN | BODY MASS INDEX: 20.2 KG/M2 | WEIGHT: 114 LBS | DIASTOLIC BLOOD PRESSURE: 76 MMHG | HEART RATE: 81 BPM | SYSTOLIC BLOOD PRESSURE: 140 MMHG | RESPIRATION RATE: 18 BRPM | TEMPERATURE: 98 F

## 2023-11-21 PROCEDURE — 99213 OFFICE O/P EST LOW 20 MIN: CPT

## 2023-11-21 NOTE — WOUND CARE
Discharge Instructions for  [address], [address] [place] [address]  Phone [phone]   Fax [phone]      NAME:  Juan Manuel Dick  YOB: 1955  MEDICAL RECORD NUMBER:  [id number]  DATE:  11/21/2023    Return Appointment:   2-3 weeks with Kristina Jha DO    Instructions:   Left foot:  Cleanse wound and periwound with wound cleanser or normal saline. Vashe moistened gauze to wound bed. Xeroform- apply to wound bed. Cover with ABD  Wrap with Kerlix. Dressing change 3x weekly. Apply vaseline to right foot. No xeroform or dressing needed. Wear socks at all times. Gerry Newell for wound assesments and dressing changes 3 times weekly. Appt on 12/12/23 for ABIs. Should you experience increased redness, swelling, pain, foul odor, size of wound(s), or have a temperature over 101 degrees please contact the [facility] [address] [facility] Rios at [phone] or if after hours contact your primary care physician or go to the hospital emergency department. PLEASE NOTE: IF YOU ARE UNABLE TO OBTAIN WOUND SUPPLIES, CONTINUE TO USE THE SUPPLIES YOU HAVE AVAILABLE UNTIL YOU ARE ABLE TO REACH US. IT IS MOST IMPORTANT TO KEEP THE WOUND COVERED AT ALL TIMES.     Electronically signed Nitin Delvalle RN on 11/21/2023 at 1:28 PM

## 2023-11-21 NOTE — FLOWSHEET NOTE
11/21/23 1318   Wound 06/19/23 Foot Left;Dorsal #2   Date First Assessed/Time First Assessed: 06/19/23 1509   Present on Hospital Admission: Yes  Wound Approximate Age at First Assessment (Weeks): 52 weeks  Primary Wound Type: Arterial Ulcer  Location: Foot  Wound Location Orientation: Left;Dorsal  Woun. .. Wound Image    Wound Etiology Arterial   Dressing Status Old drainage noted   Wound Cleansed Cleansed with saline   Dressing/Treatment Xeroform   Wound Length (cm) 3.5 cm   Wound Width (cm) 6.5 cm   Wound Depth (cm) 0.1 cm   Wound Surface Area (cm^2) 22.75 cm^2   Change in Wound Size % (l*w) 12.5   Wound Volume (cm^3) 2.275 cm^3   Wound Healing % 13   Wound Assessment [place]/red;Slough   Drainage Amount Small (< 25%)   Drainage Description Serosanguinous   Odor None   Laura-wound Assessment Cool   Wound Thickness Description not for Pressure Injury Full thickness   Wound 06/19/23 Foot Right;Dorsal;Lateral #1   Date First Assessed/Time First Assessed: 06/19/23 1508   Present on Hospital Admission: Yes  Wound Approximate Age at First Assessment (Weeks): 52 weeks  Primary Wound Type: Arterial Ulcer  Location: Foot  Wound Location Orientation: Right;Dorsal;Late. ..    Wound Image     Wound Etiology Arterial   Dressing Status Old drainage noted   Wound Cleansed Cleansed with saline   Dressing/Treatment Xeroform   Wound Length (cm) 14 cm   Wound Width (cm) 3 cm   Wound Depth (cm) 0.1 cm   Wound Surface Area (cm^2) 42 cm^2   Change in Wound Size % (l*w) -133.33   Wound Volume (cm^3) 4.2 cm^3   Wound Healing % -133   Wound Assessment Eschar dry   Drainage Amount None (dry)   Odor None   Laura-wound Assessment Cool   Pain Assessment   Pain Assessment None - Denies Pain

## 2023-12-12 ENCOUNTER — OFFICE VISIT (OUTPATIENT)
Dept: VASCULAR SURGERY | Age: 68
End: 2023-12-12
Payer: MEDICARE

## 2023-12-12 VITALS
HEART RATE: 84 BPM | DIASTOLIC BLOOD PRESSURE: 90 MMHG | HEIGHT: 63 IN | SYSTOLIC BLOOD PRESSURE: 155 MMHG | BODY MASS INDEX: 20.19 KG/M2 | OXYGEN SATURATION: 92 %

## 2023-12-12 DIAGNOSIS — I73.9 PVD (PERIPHERAL VASCULAR DISEASE) WITH CLAUDICATION (HCC): Primary | ICD-10-CM

## 2023-12-12 PROCEDURE — 1036F TOBACCO NON-USER: CPT | Performed by: SURGERY

## 2023-12-12 PROCEDURE — G8484 FLU IMMUNIZE NO ADMIN: HCPCS | Performed by: SURGERY

## 2023-12-12 PROCEDURE — 99213 OFFICE O/P EST LOW 20 MIN: CPT | Performed by: SURGERY

## 2023-12-12 PROCEDURE — 1123F ACP DISCUSS/DSCN MKR DOCD: CPT | Performed by: SURGERY

## 2023-12-12 PROCEDURE — G8420 CALC BMI NORM PARAMETERS: HCPCS | Performed by: SURGERY

## 2023-12-12 PROCEDURE — 3017F COLORECTAL CA SCREEN DOC REV: CPT | Performed by: SURGERY

## 2023-12-12 PROCEDURE — G8427 DOCREV CUR MEDS BY ELIG CLIN: HCPCS | Performed by: SURGERY

## 2023-12-12 NOTE — PROGRESS NOTES
Thiago Michele is a 80y.o. year old male nonambulator with severe knee contracture with severe tibial disease and nonhealing wounds. Patient is not a open revascularization in it. No further images as needed. If the wounds were to get worse the only option patient will be offered will be a bilateral above-the-knee amputation. iKara Rivera MD    Elements of this note have been dictated using speech recognition software. As a result, errors of speech recognition may have occurred.     20 minutes of time was spent on this encounter including chart review, assessment, evaluation and coordination of patient care

## 2023-12-15 ENCOUNTER — HOSPITAL ENCOUNTER (OUTPATIENT)
Dept: WOUND CARE | Age: 68
Discharge: HOME | End: 2023-12-15
Payer: MEDICARE

## 2023-12-15 VITALS
HEART RATE: 74 BPM | SYSTOLIC BLOOD PRESSURE: 130 MMHG | TEMPERATURE: 99.3 F | WEIGHT: 114 LBS | BODY MASS INDEX: 20.2 KG/M2 | RESPIRATION RATE: 18 BRPM | HEIGHT: 63 IN | DIASTOLIC BLOOD PRESSURE: 91 MMHG

## 2023-12-15 DIAGNOSIS — L03.116 CELLULITIS OF LEFT FOOT: Primary | ICD-10-CM

## 2023-12-15 PROCEDURE — 99213 OFFICE O/P EST LOW 20 MIN: CPT | Performed by: FAMILY MEDICINE

## 2023-12-15 PROCEDURE — 99213 OFFICE O/P EST LOW 20 MIN: CPT

## 2023-12-15 RX ORDER — LIDOCAINE HYDROCHLORIDE 40 MG/ML
SOLUTION TOPICAL ONCE
OUTPATIENT
Start: 2023-12-15 | End: 2023-12-15

## 2023-12-15 RX ORDER — LIDOCAINE HYDROCHLORIDE 20 MG/ML
JELLY TOPICAL ONCE
OUTPATIENT
Start: 2023-12-15 | End: 2023-12-15

## 2023-12-15 RX ORDER — GINSENG 100 MG
CAPSULE ORAL ONCE
OUTPATIENT
Start: 2023-12-15 | End: 2023-12-15

## 2023-12-15 RX ORDER — GENTAMICIN SULFATE 1 MG/G
OINTMENT TOPICAL ONCE
OUTPATIENT
Start: 2023-12-15 | End: 2023-12-15

## 2023-12-15 RX ORDER — IBUPROFEN 200 MG
TABLET ORAL ONCE
OUTPATIENT
Start: 2023-12-15 | End: 2023-12-15

## 2023-12-15 RX ORDER — LIDOCAINE 40 MG/G
CREAM TOPICAL ONCE
OUTPATIENT
Start: 2023-12-15 | End: 2023-12-15

## 2023-12-15 RX ORDER — SODIUM CHLOR/HYPOCHLOROUS ACID 0.033 %
SOLUTION, IRRIGATION IRRIGATION ONCE
OUTPATIENT
Start: 2023-12-15 | End: 2023-12-15

## 2023-12-15 RX ORDER — BACITRACIN ZINC AND POLYMYXIN B SULFATE 500; 1000 [USP'U]/G; [USP'U]/G
OINTMENT TOPICAL ONCE
OUTPATIENT
Start: 2023-12-15 | End: 2023-12-15

## 2023-12-15 RX ORDER — BETAMETHASONE DIPROPIONATE 0.05 %
OINTMENT (GRAM) TOPICAL ONCE
OUTPATIENT
Start: 2023-12-15 | End: 2023-12-15

## 2023-12-15 RX ORDER — CEPHALEXIN 500 MG/1
500 CAPSULE ORAL 4 TIMES DAILY
Qty: 40 CAPSULE | Refills: 0 | Status: SHIPPED | OUTPATIENT
Start: 2023-12-15 | End: 2023-12-25

## 2023-12-15 RX ORDER — CLOBETASOL PROPIONATE 0.5 MG/G
OINTMENT TOPICAL ONCE
OUTPATIENT
Start: 2023-12-15 | End: 2023-12-15

## 2023-12-15 RX ORDER — LIDOCAINE 50 MG/G
OINTMENT TOPICAL ONCE
OUTPATIENT
Start: 2023-12-15 | End: 2023-12-15

## 2023-12-15 RX ORDER — TRIAMCINOLONE ACETONIDE 1 MG/G
OINTMENT TOPICAL ONCE
OUTPATIENT
Start: 2023-12-15 | End: 2023-12-15

## 2023-12-15 NOTE — DISCHARGE INSTRUCTIONS
Instructions:   Left foot:  Cleanse wound and periwound with wound cleanser or normal saline. Vashe moistened gauze to wound bed. Xeroform- apply to wound bed. Cover with ABD  Wrap with Kerlix. Dressing change 3x weekly. Right second digit. Cleanse wound and periwound with wound cleanser or normal saline. Vashe moistened gauze to wound bed. Xeroform- apply to wound bed. Secure with with roll gauze. Dressing change 3x weekly. Wear socks at all times. Gerry Newell for wound assesments and dressing changes 3 times weekly.

## 2023-12-15 NOTE — FLOWSHEET NOTE
12/15/23 1310   Right Leg Edema Point of Measurement   Leg circumference 28 cm   Ankle circumference 19.5 cm   Left Leg Edema Point of Measurement   Leg circumference 29 cm   Ankle circumference 19.5 cm   Wound 06/19/23 Foot Right;Dorsal;Lateral #1   Date First Assessed/Time First Assessed: 06/19/23 1508   Present on Hospital Admission: Yes  Wound Approximate Age at First Assessment (Weeks): 52 weeks  Primary Wound Type: Arterial Ulcer  Location: Foot  Wound Location Orientation: Right;Dorsal;Late. .. Wound Image    Wound Etiology Arterial   Dressing Status Old drainage noted   Wound Cleansed Vashe   Dressing/Treatment Open to air   Wound Assessment Dry   Drainage Amount None (dry)   Odor None   Wound Thickness Description not for Pressure Injury Full thickness   Wound 06/19/23 Foot Left;Dorsal #2   Date First Assessed/Time First Assessed: 06/19/23 1509   Present on Hospital Admission: Yes  Wound Approximate Age at First Assessment (Weeks): 52 weeks  Primary Wound Type: Arterial Ulcer  Location: Foot  Wound Location Orientation: Left;Dorsal  Woun. ..    Wound Image    Wound Etiology Arterial   Dressing Status Old drainage noted   Wound Cleansed Soap and water   Dressing/Treatment Xeroform   Wound Length (cm) 2.5 cm   Wound Width (cm) 3.5 cm   Wound Depth (cm) 0.1 cm   Wound Surface Area (cm^2) 8.75 cm^2   Change in Wound Size % (l*w) 66.35   Wound Volume (cm^3) 0.875 cm^3   Wound Healing % 66   Wound Assessment Pink/red   Drainage Amount Small (< 25%)   Drainage Description Serosanguinous   Odor None   Laura-wound Assessment Maceration   Wound Thickness Description not for Pressure Injury Full thickness   Wound 08/07/23 Toe (Comment  which one) Right dry eschar   Date First Assessed/Time First Assessed: 08/07/23 1141   Present on Hospital Admission: Yes  Primary Wound Type: Arterial Ulcer  Location: Toe (Comment  which one)  Wound Location Orientation: (c) Right  Wound Description (Comments): dry eschar   Wound

## 2023-12-15 NOTE — WOUND CARE
Discharge Instructions for  [address] [facility]  800 [facility], [address] [place] [address]  Phone [phone]   Fax [phone]      NAME:  Saúl Napier  YOB: 1955  MEDICAL RECORD NUMBER:  [id number]  DATE:  12/15/2023    Return Appointment:   2-3 weeks with Jose Degroot DO    Instructions:   Left foot:  Cleanse wound and periwound with wound cleanser or normal saline. Vashe moistened gauze to wound bed. Xeroform- apply to wound bed. Cover with ABD  Wrap with Kerlix. Dressing change 3x weekly. Right second digit. Cleanse wound and periwound with wound cleanser or normal saline. Vashe moistened gauze to wound bed. Xeroform- apply to wound bed. Secure with with roll gauze. Dressing change 3x weekly. Wear socks at all times. Keflex prescribed at today's visit for right 2nd toe. Gerry Newell for wound assesments and dressing changes 3 times weekly. Should you experience increased redness, swelling, pain, foul odor, size of wound(s), or have a temperature over 101 degrees please contact the [facility] [address] [facility] Rios at [phone] or if after hours contact your primary care physician or go to the hospital emergency department. PLEASE NOTE: IF YOU ARE UNABLE TO OBTAIN WOUND SUPPLIES, CONTINUE TO USE THE SUPPLIES YOU HAVE AVAILABLE UNTIL YOU ARE ABLE TO REACH US. IT IS MOST IMPORTANT TO KEEP THE WOUND COVERED AT ALL TIMES.     Electronically signed Leyla Malik RN on 12/15/2023 at 1:39 PM

## 2024-01-05 ENCOUNTER — HOSPITAL ENCOUNTER (OUTPATIENT)
Dept: WOUND CARE | Age: 69
Discharge: HOME | End: 2024-01-05
Payer: MEDICARE

## 2024-01-05 VITALS
RESPIRATION RATE: 18 BRPM | HEART RATE: 67 BPM | DIASTOLIC BLOOD PRESSURE: 66 MMHG | HEIGHT: 63 IN | SYSTOLIC BLOOD PRESSURE: 143 MMHG | OXYGEN SATURATION: 98 % | BODY MASS INDEX: 20.2 KG/M2 | TEMPERATURE: 97.3 F | WEIGHT: 114 LBS

## 2024-01-05 DIAGNOSIS — L03.116 CELLULITIS OF LEFT FOOT: Primary | ICD-10-CM

## 2024-01-05 PROCEDURE — 99213 OFFICE O/P EST LOW 20 MIN: CPT

## 2024-01-05 RX ORDER — GINSENG 100 MG
CAPSULE ORAL ONCE
OUTPATIENT
Start: 2024-01-05 | End: 2024-01-05

## 2024-01-05 RX ORDER — CLOBETASOL PROPIONATE 0.5 MG/G
OINTMENT TOPICAL ONCE
OUTPATIENT
Start: 2024-01-05 | End: 2024-01-05

## 2024-01-05 RX ORDER — LIDOCAINE HYDROCHLORIDE 40 MG/ML
SOLUTION TOPICAL ONCE
OUTPATIENT
Start: 2024-01-05 | End: 2024-01-05

## 2024-01-05 RX ORDER — SODIUM CHLOR/HYPOCHLOROUS ACID 0.033 %
SOLUTION, IRRIGATION IRRIGATION ONCE
OUTPATIENT
Start: 2024-01-05 | End: 2024-01-05

## 2024-01-05 RX ORDER — LIDOCAINE HYDROCHLORIDE 20 MG/ML
JELLY TOPICAL ONCE
OUTPATIENT
Start: 2024-01-05 | End: 2024-01-05

## 2024-01-05 RX ORDER — IBUPROFEN 200 MG
TABLET ORAL ONCE
OUTPATIENT
Start: 2024-01-05 | End: 2024-01-05

## 2024-01-05 RX ORDER — BETAMETHASONE DIPROPIONATE 0.05 %
OINTMENT (GRAM) TOPICAL ONCE
OUTPATIENT
Start: 2024-01-05 | End: 2024-01-05

## 2024-01-05 RX ORDER — GENTAMICIN SULFATE 1 MG/G
OINTMENT TOPICAL ONCE
OUTPATIENT
Start: 2024-01-05 | End: 2024-01-05

## 2024-01-05 RX ORDER — LIDOCAINE 50 MG/G
OINTMENT TOPICAL ONCE
OUTPATIENT
Start: 2024-01-05 | End: 2024-01-05

## 2024-01-05 RX ORDER — BACITRACIN ZINC AND POLYMYXIN B SULFATE 500; 1000 [USP'U]/G; [USP'U]/G
OINTMENT TOPICAL ONCE
OUTPATIENT
Start: 2024-01-05 | End: 2024-01-05

## 2024-01-05 RX ORDER — LIDOCAINE 40 MG/G
CREAM TOPICAL ONCE
OUTPATIENT
Start: 2024-01-05 | End: 2024-01-05

## 2024-01-05 RX ORDER — TRIAMCINOLONE ACETONIDE 1 MG/G
OINTMENT TOPICAL ONCE
OUTPATIENT
Start: 2024-01-05 | End: 2024-01-05

## 2024-01-05 NOTE — FLOWSHEET NOTE
01/05/24 1406   Right Leg Edema Point of Measurement   Leg circumference 26.5 cm   Ankle circumference 19 cm   Left Leg Edema Point of Measurement   Leg circumference 28.5 cm   Ankle circumference 20 cm   Wound 08/07/23 Toe (Comment  which one) Right #3 right 2nd toe   Date First Assessed/Time First Assessed: 08/07/23 1141   Present on Original Admission: Yes  Primary Wound Type: Arterial Ulcer  Location: Toe (Comment  which one)  Wound Location Orientation: (c) Right  Wound Description (Comments): #3 right 2nd toe   Wound Image    Wound Etiology Arterial   Dressing Status Intact   Wound Cleansed Vashe   Dressing/Treatment Xeroform   Wound Length (cm) 0 cm   Wound Width (cm) 0 cm   Wound Depth (cm) 0 cm   Wound Surface Area (cm^2) 0 cm^2   Change in Wound Size % (l*w) 100   Wound Volume (cm^3) 0 cm^3   Wound Healing % 100   Wound Assessment Pink/red   Drainage Amount None (dry)   Odor None   Laura-wound Assessment Dry/flaky   Wound Thickness Description not for Pressure Injury Full thickness   Wound 06/19/23 Foot Left;Dorsal #2   Date First Assessed/Time First Assessed: 06/19/23 1509   Present on Hospital Admission: Yes  Wound Approximate Age at First Assessment (Weeks): 52 weeks  Primary Wound Type: Arterial Ulcer  Location: Foot  Wound Location Orientation: Left;Dorsal  Woun...   Wound Image    Wound Etiology Arterial   Dressing Status Old drainage noted   Wound Cleansed Soap and water   Dressing/Treatment Xeroform   Wound Length (cm) 0 cm   Wound Width (cm) 0 cm   Wound Depth (cm) 0 cm   Wound Surface Area (cm^2) 0 cm^2   Change in Wound Size % (l*w) 100   Wound Volume (cm^3) 0 cm^3   Wound Healing % 100   Wound Assessment Pink/red   Drainage Amount None (dry)   Odor None   Laura-wound Assessment Blanchable erythema   Wound Thickness Description not for Pressure Injury Full thickness

## 2024-01-05 NOTE — WOUND CARE
Discharge Instructions for  [facility]  [address] [place] [address]  [address]  [place] [address]  Phone [phone]   Fax [phone]      NAME:  Clinton Beckford  YOB: 1955  MEDICAL RECORD NUMBER:  [id number]  DATE:  1/5/2024    Return Appointment:   2 weeks with Christian Mercado DO    Instructions:   Left foot:  Cleanse wound and periwound with wound cleanser or normal saline.   Vashe moistened gauze to wound bed.  Xeroform- apply to wound bed.   Cover with ABD  Wrap with Kerlix.  Dressing change 3x weekly.    Right second digit.  Cleanse wound and periwound with wound cleanser or normal saline.   Vashe moistened gauze to wound bed.  Xeroform- apply to wound bed.  Cover with ABD for protection.   Secure with with roll gauze.  Dressing change 3x weekly.    Wear socks at all times.    [facility] for wound assesments and dressing changes 3 times weekly.    Should you experience increased redness, swelling, pain, foul odor, size of wound(s), or have a temperature over 101 degrees please contact the [facility] at [phone] or if after hours contact your primary care physician or go to the hospital emergency department.    PLEASE NOTE: IF YOU ARE UNABLE TO OBTAIN WOUND SUPPLIES, CONTINUE TO USE THE SUPPLIES YOU HAVE AVAILABLE UNTIL YOU ARE ABLE TO REACH US. IT IS MOST IMPORTANT TO KEEP THE WOUND COVERED AT ALL TIMES.    Electronically signed Kade Herzog RN on 1/5/2024 at 2:44 PM

## 2024-01-05 NOTE — DISCHARGE INSTRUCTIONS
Instructions:   Left foot:  Cleanse wound and periwound with wound cleanser or normal saline.   Vashe moistened gauze to wound bed.  Xeroform- apply to wound bed.   Cover with ABD  Wrap with Kerlix.  Dressing change 3x weekly.     Right second digit.  Cleanse wound and periwound with wound cleanser or normal saline.   Vashe moistened gauze to wound bed.  Xeroform- apply to wound bed.  Cover with ABD for protection.   Secure with with roll gauze.  Dressing change 3x weekly.     Wear socks at all times.     [facility] for wound assesments and dressing changes 3 times weekly.

## 2024-01-17 ENCOUNTER — HOSPITAL ENCOUNTER (OUTPATIENT)
Dept: WOUND CARE | Age: 69
Discharge: HOME | End: 2024-01-17
Payer: MEDICARE

## 2024-01-17 VITALS
HEART RATE: 68 BPM | TEMPERATURE: 98.3 F | DIASTOLIC BLOOD PRESSURE: 75 MMHG | SYSTOLIC BLOOD PRESSURE: 145 MMHG | BODY MASS INDEX: 20.2 KG/M2 | WEIGHT: 114 LBS | HEIGHT: 63 IN

## 2024-01-17 PROCEDURE — 99213 OFFICE O/P EST LOW 20 MIN: CPT

## 2024-01-17 NOTE — DISCHARGE INSTRUCTIONS
Discharge Instructions for  [facility]  [address] [place] [address]  [address] [place] [address]  Phone [phone]   Fax [phone]      NAME:  Clinton Beckford  YOB: 1955  MEDICAL RECORD NUMBER:  [id number]  DATE:  @ED@    Return Appointment:   1 month with Christian Mercado DO      Instructions: Wounds are healed  Left foot and right second toe:  Apply vaseline daily and as needed for dryness then leave open to air     Wear socks at all times.     If wounds open back up and drain come back to wound center sooner     Discharge home health at this time        Should you experience increased redness, swelling, pain, foul odor, size of wound(s), or have a temperature over 101 degrees please contact the [facility]aling Center at [phone] or if after hours contact your primary care physician or go to the hospital emergency department.    PLEASE NOTE: IF YOU ARE UNABLE TO OBTAIN WOUND SUPPLIES, CONTINUE TO USE THE SUPPLIES YOU HAVE AVAILABLE UNTIL YOU ARE ABLE TO REACH US. IT IS MOST IMPORTANT TO KEEP THE WOUND COVERED AT ALL TIMES.    Electronically signed Mirtha Loving RN on 1/17/2024 at 1:44 PM

## 2024-01-17 NOTE — WOUND CARE
Discharge Instructions for  [facility]  [address] [place] [address]  [address] [place] [address]  Phone [phone]   Fax [phone]      NAME:  Clinton Beckford  YOB: 1955  MEDICAL RECORD NUMBER:  [id number]  DATE:  1/17/2024    Return Appointment:   4 weeks with Christian Mercado DO    Instructions: Wounds are healed  Left foot and right second toe:  Apply vaseline daily and as needed for dryness then leave open to air    Wear socks at all times.    If wounds open back up and drain come back to wound center sooner    Discharge home health at this time    Should you experience increased redness, swelling, pain, foul odor, size of wound(s), or have a temperature over 101 degrees please contact the [facility]aling Center at [phone] or if after hours contact your primary care physician or go to the hospital emergency department.    PLEASE NOTE: IF YOU ARE UNABLE TO OBTAIN WOUND SUPPLIES, CONTINUE TO USE THE SUPPLIES YOU HAVE AVAILABLE UNTIL YOU ARE ABLE TO REACH US. IT IS MOST IMPORTANT TO KEEP THE WOUND COVERED AT ALL TIMES.    Electronically signed Mirtha Loving RN on 1/17/2024 at 1:37 PM

## 2024-01-17 NOTE — FLOWSHEET NOTE
01/17/24 1326   Wound 08/07/23 Toe (Comment  which one) Right #3 right 2nd toe   Date First Assessed/Time First Assessed: 08/07/23 1141   Present on Original Admission: Yes  Primary Wound Type: Arterial Ulcer  Location: Toe (Comment  which one)  Wound Location Orientation: (c) Right  Wound Description (Comments): #3 right 2nd toe   Wound Image    Wound Etiology Arterial   Dressing Status Intact   Wound Cleansed Cleansed with saline   Dressing/Treatment Open to air   Wound Length (cm) 0.2 cm   Wound Width (cm) 0.5 cm   Wound Depth (cm) 0 cm   Wound Surface Area (cm^2) 0.1 cm^2   Change in Wound Size % (l*w) 66.67   Wound Volume (cm^3) 0 cm^3   Wound Healing % 100   Wound Assessment Eschar dry   Drainage Amount None (dry)   Odor None   Laura-wound Assessment Dry/flaky   Wound Thickness Description not for Pressure Injury Full thickness   Wound 06/19/23 Foot Left;Dorsal #2   Date First Assessed/Time First Assessed: 06/19/23 1509   Present on Hospital Admission: Yes  Wound Approximate Age at First Assessment (Weeks): 52 weeks  Primary Wound Type: Arterial Ulcer  Location: Foot  Wound Location Orientation: Left;Dorsal  Woun...   Wound Image    Wound Etiology Arterial   Dressing Status Clean;Dry;Intact   Wound Cleansed Cleansed with saline   Dressing/Treatment Open to air   Wound Length (cm) 0 cm   Wound Width (cm) 0 cm   Wound Depth (cm) 0 cm   Wound Surface Area (cm^2) 0 cm^2   Change in Wound Size % (l*w) 100   Wound Volume (cm^3) 0 cm^3   Wound Healing % 100   Wound Assessment Purple/maroon   Drainage Amount None (dry)   Odor None   Laura-wound Assessment Edematous

## 2024-02-07 ENCOUNTER — APPOINTMENT (OUTPATIENT)
Dept: GENERAL RADIOLOGY | Age: 69
DRG: 871 | End: 2024-02-07
Payer: MEDICARE

## 2024-02-07 ENCOUNTER — HOSPITAL ENCOUNTER (INPATIENT)
Age: 69
LOS: 5 days | Discharge: HOME HEALTH | DRG: 871 | End: 2024-02-12
Attending: EMERGENCY MEDICINE | Admitting: HOSPITALIST
Payer: MEDICARE

## 2024-02-07 DIAGNOSIS — U07.1 PNEUMONIA DUE TO COVID-19 VIRUS: Primary | ICD-10-CM

## 2024-02-07 DIAGNOSIS — J44.1 COPD EXACERBATION (HCC): ICD-10-CM

## 2024-02-07 DIAGNOSIS — J12.82 PNEUMONIA DUE TO COVID-19 VIRUS: Primary | ICD-10-CM

## 2024-02-07 DIAGNOSIS — G35 MULTIPLE SCLEROSIS EXACERBATION (HCC): ICD-10-CM

## 2024-02-07 LAB
ALBUMIN SERPL-MCNC: 3.4 G/DL (ref 3.2–4.6)
ALBUMIN/GLOB SERPL: 0.9 (ref 0.4–1.6)
ALP SERPL-CCNC: 97 U/L (ref 50–136)
ALT SERPL-CCNC: 32 U/L (ref 12–65)
ANION GAP SERPL CALC-SCNC: 3 MMOL/L (ref 2–11)
ARTERIAL PATENCY WRIST A: ABNORMAL
AST SERPL-CCNC: 26 U/L (ref 15–37)
BACTERIA URNS QL MICRO: NEGATIVE /HPF
BASE DEFICIT BLDV-SCNC: 1.6 MMOL/L
BASOPHILS # BLD: 0 K/UL (ref 0–0.2)
BASOPHILS NFR BLD: 0 % (ref 0–2)
BILIRUB SERPL-MCNC: 1.1 MG/DL (ref 0.2–1.1)
BUN SERPL-MCNC: 13 MG/DL (ref 8–23)
CALCIUM SERPL-MCNC: 8.7 MG/DL (ref 8.3–10.4)
CASTS URNS QL MICRO: NORMAL /LPF
CHLORIDE SERPL-SCNC: 109 MMOL/L (ref 103–113)
CO2 SERPL-SCNC: 25 MMOL/L (ref 21–32)
CREAT SERPL-MCNC: 0.6 MG/DL (ref 0.8–1.5)
CRP SERPL-MCNC: 11.2 MG/DL (ref 0–0.9)
DIFFERENTIAL METHOD BLD: ABNORMAL
EKG ATRIAL RATE: 98 BPM
EKG DIAGNOSIS: NORMAL
EKG P AXIS: 85 DEGREES
EKG P-R INTERVAL: 182 MS
EKG Q-T INTERVAL: 370 MS
EKG QRS DURATION: 100 MS
EKG QTC CALCULATION (BAZETT): 473 MS
EKG R AXIS: 109 DEGREES
EKG T AXIS: 64 DEGREES
EKG VENTRICULAR RATE: 98 BPM
EOSINOPHIL # BLD: 0.1 K/UL (ref 0–0.8)
EOSINOPHIL NFR BLD: 1 % (ref 0.5–7.8)
EPI CELLS #/AREA URNS HPF: NORMAL /HPF
ERYTHROCYTE [DISTWIDTH] IN BLOOD BY AUTOMATED COUNT: 14.6 % (ref 11.9–14.6)
FLUAV RNA SPEC QL NAA+PROBE: NOT DETECTED
FLUBV RNA SPEC QL NAA+PROBE: NOT DETECTED
GLOBULIN SER CALC-MCNC: 3.6 G/DL (ref 2.8–4.5)
GLUCOSE SERPL-MCNC: 109 MG/DL (ref 65–100)
HCO3 BLDV-SCNC: 23.3 MMOL/L (ref 23–28)
HCT VFR BLD AUTO: 44.5 % (ref 41.1–50.3)
HGB BLD-MCNC: 14.4 G/DL (ref 13.6–17.2)
IMM GRANULOCYTES # BLD AUTO: 0.1 K/UL (ref 0–0.5)
IMM GRANULOCYTES NFR BLD AUTO: 0 % (ref 0–5)
LACTATE SERPL-SCNC: 0.8 MMOL/L (ref 0.4–2)
LACTATE SERPL-SCNC: 5.7 MMOL/L (ref 0.4–2)
LYMPHOCYTES # BLD: 0.6 K/UL (ref 0.5–4.6)
LYMPHOCYTES NFR BLD: 5 % (ref 13–44)
MCH RBC QN AUTO: 28.8 PG (ref 26.1–32.9)
MCHC RBC AUTO-ENTMCNC: 32.4 G/DL (ref 31.4–35)
MCV RBC AUTO: 89 FL (ref 82–102)
MONOCYTES # BLD: 0.8 K/UL (ref 0.1–1.3)
MONOCYTES NFR BLD: 6 % (ref 4–12)
MUCOUS THREADS URNS QL MICRO: 0 /LPF
NEUTS SEG # BLD: 11.5 K/UL (ref 1.7–8.2)
NEUTS SEG NFR BLD: 88 % (ref 43–78)
NRBC # BLD: 0 K/UL (ref 0–0.2)
PCO2 BLDV: 39.2 MMHG (ref 41–51)
PH BLDV: 7.38 (ref 7.32–7.42)
PLATELET # BLD AUTO: 235 K/UL (ref 150–450)
PMV BLD AUTO: 11.1 FL (ref 9.4–12.3)
PO2 BLDV: 40 MMHG
POC FIO2: 2
POTASSIUM SERPL-SCNC: 3.9 MMOL/L (ref 3.5–5.1)
PROCALCITONIN SERPL-MCNC: 0.06 NG/ML (ref 0–0.49)
PROT SERPL-MCNC: 7 G/DL (ref 6.3–8.2)
RBC # BLD AUTO: 5 M/UL (ref 4.23–5.6)
RBC #/AREA URNS HPF: NORMAL /HPF
SAO2 % BLDV: 73.9 % (ref 65–88)
SARS-COV-2 RDRP RESP QL NAA+PROBE: DETECTED
SERVICE CMNT-IMP: ABNORMAL
SERVICE CMNT-IMP: ABNORMAL
SODIUM SERPL-SCNC: 137 MMOL/L (ref 136–146)
SOURCE: ABNORMAL
SPECIMEN TYPE: ABNORMAL
WBC # BLD AUTO: 13.1 K/UL (ref 4.3–11.1)
WBC URNS QL MICRO: NORMAL /HPF

## 2024-02-07 PROCEDURE — 93010 ELECTROCARDIOGRAM REPORT: CPT | Performed by: INTERNAL MEDICINE

## 2024-02-07 PROCEDURE — 87502 INFLUENZA DNA AMP PROBE: CPT

## 2024-02-07 PROCEDURE — 2580000003 HC RX 258: Performed by: EMERGENCY MEDICINE

## 2024-02-07 PROCEDURE — A4216 STERILE WATER/SALINE, 10 ML: HCPCS | Performed by: HOSPITALIST

## 2024-02-07 PROCEDURE — 85025 COMPLETE CBC W/AUTO DIFF WBC: CPT

## 2024-02-07 PROCEDURE — 94664 DEMO&/EVAL PT USE INHALER: CPT

## 2024-02-07 PROCEDURE — 71045 X-RAY EXAM CHEST 1 VIEW: CPT

## 2024-02-07 PROCEDURE — 80053 COMPREHEN METABOLIC PANEL: CPT

## 2024-02-07 PROCEDURE — 6360000002 HC RX W HCPCS: Performed by: EMERGENCY MEDICINE

## 2024-02-07 PROCEDURE — 82803 BLOOD GASES ANY COMBINATION: CPT

## 2024-02-07 PROCEDURE — 86140 C-REACTIVE PROTEIN: CPT

## 2024-02-07 PROCEDURE — 2700000000 HC OXYGEN THERAPY PER DAY

## 2024-02-07 PROCEDURE — 6360000002 HC RX W HCPCS: Performed by: HOSPITALIST

## 2024-02-07 PROCEDURE — 99285 EMERGENCY DEPT VISIT HI MDM: CPT

## 2024-02-07 PROCEDURE — 1100000000 HC RM PRIVATE

## 2024-02-07 PROCEDURE — 84145 PROCALCITONIN (PCT): CPT

## 2024-02-07 PROCEDURE — 36415 COLL VENOUS BLD VENIPUNCTURE: CPT

## 2024-02-07 PROCEDURE — 2500000003 HC RX 250 WO HCPCS: Performed by: HOSPITALIST

## 2024-02-07 PROCEDURE — 2580000003 HC RX 258: Performed by: HOSPITALIST

## 2024-02-07 PROCEDURE — 87040 BLOOD CULTURE FOR BACTERIA: CPT

## 2024-02-07 PROCEDURE — 83605 ASSAY OF LACTIC ACID: CPT

## 2024-02-07 PROCEDURE — 6370000000 HC RX 637 (ALT 250 FOR IP): Performed by: HOSPITALIST

## 2024-02-07 PROCEDURE — 81015 MICROSCOPIC EXAM OF URINE: CPT

## 2024-02-07 PROCEDURE — 96374 THER/PROPH/DIAG INJ IV PUSH: CPT

## 2024-02-07 PROCEDURE — 94640 AIRWAY INHALATION TREATMENT: CPT

## 2024-02-07 PROCEDURE — 96361 HYDRATE IV INFUSION ADD-ON: CPT

## 2024-02-07 PROCEDURE — 93005 ELECTROCARDIOGRAM TRACING: CPT | Performed by: EMERGENCY MEDICINE

## 2024-02-07 PROCEDURE — 87635 SARS-COV-2 COVID-19 AMP PRB: CPT

## 2024-02-07 PROCEDURE — 6370000000 HC RX 637 (ALT 250 FOR IP): Performed by: EMERGENCY MEDICINE

## 2024-02-07 RX ORDER — ALBUTEROL SULFATE 90 UG/1
2 AEROSOL, METERED RESPIRATORY (INHALATION)
Status: DISCONTINUED | OUTPATIENT
Start: 2024-02-07 | End: 2024-02-08

## 2024-02-07 RX ORDER — SODIUM CHLORIDE 9 MG/ML
INJECTION, SOLUTION INTRAVENOUS PRN
Status: DISCONTINUED | OUTPATIENT
Start: 2024-02-07 | End: 2024-02-12 | Stop reason: HOSPADM

## 2024-02-07 RX ORDER — MAGNESIUM SULFATE IN WATER 40 MG/ML
2000 INJECTION, SOLUTION INTRAVENOUS PRN
Status: DISCONTINUED | OUTPATIENT
Start: 2024-02-07 | End: 2024-02-12 | Stop reason: HOSPADM

## 2024-02-07 RX ORDER — POLYETHYLENE GLYCOL 3350 17 G/17G
17 POWDER, FOR SOLUTION ORAL DAILY PRN
Status: DISCONTINUED | OUTPATIENT
Start: 2024-02-07 | End: 2024-02-12 | Stop reason: HOSPADM

## 2024-02-07 RX ORDER — ACETAMINOPHEN 650 MG/1
650 SUPPOSITORY RECTAL EVERY 6 HOURS PRN
Status: DISCONTINUED | OUTPATIENT
Start: 2024-02-07 | End: 2024-02-12 | Stop reason: HOSPADM

## 2024-02-07 RX ORDER — ALBUTEROL SULFATE 2.5 MG/3ML
5 SOLUTION RESPIRATORY (INHALATION)
Status: COMPLETED | OUTPATIENT
Start: 2024-02-07 | End: 2024-02-07

## 2024-02-07 RX ORDER — POTASSIUM CHLORIDE 7.45 MG/ML
10 INJECTION INTRAVENOUS PRN
Status: DISCONTINUED | OUTPATIENT
Start: 2024-02-07 | End: 2024-02-12 | Stop reason: HOSPADM

## 2024-02-07 RX ORDER — SODIUM CHLORIDE, SODIUM LACTATE, POTASSIUM CHLORIDE, AND CALCIUM CHLORIDE .6; .31; .03; .02 G/100ML; G/100ML; G/100ML; G/100ML
1000 INJECTION, SOLUTION INTRAVENOUS
Status: COMPLETED | OUTPATIENT
Start: 2024-02-07 | End: 2024-02-07

## 2024-02-07 RX ORDER — SODIUM CHLORIDE 0.9 % (FLUSH) 0.9 %
5-40 SYRINGE (ML) INJECTION PRN
Status: DISCONTINUED | OUTPATIENT
Start: 2024-02-07 | End: 2024-02-12 | Stop reason: HOSPADM

## 2024-02-07 RX ORDER — GUAIFENESIN 600 MG/1
600 TABLET, EXTENDED RELEASE ORAL 2 TIMES DAILY
Status: DISCONTINUED | OUTPATIENT
Start: 2024-02-07 | End: 2024-02-12 | Stop reason: HOSPADM

## 2024-02-07 RX ORDER — PROMETHAZINE HYDROCHLORIDE 12.5 MG/1
12.5 TABLET ORAL EVERY 6 HOURS PRN
Status: DISCONTINUED | OUTPATIENT
Start: 2024-02-07 | End: 2024-02-12 | Stop reason: HOSPADM

## 2024-02-07 RX ORDER — IPRATROPIUM BROMIDE AND ALBUTEROL SULFATE 2.5; .5 MG/3ML; MG/3ML
1 SOLUTION RESPIRATORY (INHALATION)
Status: COMPLETED | OUTPATIENT
Start: 2024-02-07 | End: 2024-02-07

## 2024-02-07 RX ORDER — ACETAMINOPHEN 325 MG/1
650 TABLET ORAL EVERY 4 HOURS PRN
Status: DISCONTINUED | OUTPATIENT
Start: 2024-02-07 | End: 2024-02-12 | Stop reason: HOSPADM

## 2024-02-07 RX ORDER — DEXAMETHASONE SODIUM PHOSPHATE 10 MG/ML
6 INJECTION INTRAMUSCULAR; INTRAVENOUS EVERY 24 HOURS
Status: DISCONTINUED | OUTPATIENT
Start: 2024-02-08 | End: 2024-02-11

## 2024-02-07 RX ORDER — ASPIRIN 325 MG
325 TABLET ORAL DAILY
Status: DISCONTINUED | OUTPATIENT
Start: 2024-02-07 | End: 2024-02-12 | Stop reason: HOSPADM

## 2024-02-07 RX ORDER — LANOLIN ALCOHOL/MO/W.PET/CERES
3 CREAM (GRAM) TOPICAL NIGHTLY
Status: DISCONTINUED | OUTPATIENT
Start: 2024-02-07 | End: 2024-02-12 | Stop reason: HOSPADM

## 2024-02-07 RX ORDER — ONDANSETRON 2 MG/ML
4 INJECTION INTRAMUSCULAR; INTRAVENOUS EVERY 4 HOURS PRN
Status: DISCONTINUED | OUTPATIENT
Start: 2024-02-07 | End: 2024-02-12 | Stop reason: HOSPADM

## 2024-02-07 RX ORDER — POTASSIUM CHLORIDE 20 MEQ/1
40 TABLET, EXTENDED RELEASE ORAL PRN
Status: DISCONTINUED | OUTPATIENT
Start: 2024-02-07 | End: 2024-02-12 | Stop reason: HOSPADM

## 2024-02-07 RX ORDER — SODIUM CHLORIDE 0.9 % (FLUSH) 0.9 %
5-40 SYRINGE (ML) INJECTION EVERY 12 HOURS SCHEDULED
Status: DISCONTINUED | OUTPATIENT
Start: 2024-02-07 | End: 2024-02-12 | Stop reason: HOSPADM

## 2024-02-07 RX ORDER — DEXTROSE AND SODIUM CHLORIDE 5; .45 G/100ML; G/100ML
INJECTION, SOLUTION INTRAVENOUS CONTINUOUS
Status: ACTIVE | OUTPATIENT
Start: 2024-02-07 | End: 2024-02-09

## 2024-02-07 RX ORDER — ENOXAPARIN SODIUM 100 MG/ML
40 INJECTION SUBCUTANEOUS EVERY EVENING
Status: DISCONTINUED | OUTPATIENT
Start: 2024-02-07 | End: 2024-02-12 | Stop reason: HOSPADM

## 2024-02-07 RX ADMIN — WATER 1000 MG: 1 INJECTION INTRAMUSCULAR; INTRAVENOUS; SUBCUTANEOUS at 19:10

## 2024-02-07 RX ADMIN — SODIUM CHLORIDE, PRESERVATIVE FREE 10 ML: 5 INJECTION INTRAVENOUS at 21:49

## 2024-02-07 RX ADMIN — ALBUTEROL SULFATE 5 MG: 2.5 SOLUTION RESPIRATORY (INHALATION) at 15:16

## 2024-02-07 RX ADMIN — IPRATROPIUM BROMIDE AND ALBUTEROL SULFATE 1 DOSE: 2.5; .5 SOLUTION RESPIRATORY (INHALATION) at 14:29

## 2024-02-07 RX ADMIN — SODIUM CHLORIDE, POTASSIUM CHLORIDE, SODIUM LACTATE AND CALCIUM CHLORIDE 1000 ML: 600; 310; 30; 20 INJECTION, SOLUTION INTRAVENOUS at 14:37

## 2024-02-07 RX ADMIN — DOXYCYCLINE 100 MG: 100 INJECTION, POWDER, LYOPHILIZED, FOR SOLUTION INTRAVENOUS at 19:14

## 2024-02-07 RX ADMIN — Medication 3 MG: at 21:49

## 2024-02-07 RX ADMIN — ASPIRIN 325 MG: 325 TABLET, FILM COATED ORAL at 19:10

## 2024-02-07 RX ADMIN — WATER 125 MG: 1 INJECTION INTRAMUSCULAR; INTRAVENOUS; SUBCUTANEOUS at 14:34

## 2024-02-07 RX ADMIN — GUAIFENESIN 600 MG: 600 TABLET ORAL at 21:49

## 2024-02-07 RX ADMIN — FAMOTIDINE 20 MG: 10 INJECTION, SOLUTION INTRAVENOUS at 21:49

## 2024-02-07 RX ADMIN — ENOXAPARIN SODIUM 40 MG: 100 INJECTION SUBCUTANEOUS at 19:10

## 2024-02-07 RX ADMIN — DEXTROSE AND SODIUM CHLORIDE: 5; 450 INJECTION, SOLUTION INTRAVENOUS at 21:19

## 2024-02-07 NOTE — ED TRIAGE NOTES
Pt states trouble exhaling and a non productive cough. Patients roommates' wife tested positive for covid today. Satting 93% RA with EMS, 98% with 3L. Hx of COPD

## 2024-02-07 NOTE — ED PROVIDER NOTES
Emergency Department Provider Note       PCP: Negrito Roman MD   Age: 68 y.o.   Sex: male     DISPOSITION Admitted 02/07/2024 04:31:09 PM       ICD-10-CM    1. Pneumonia due to COVID-19 virus  U07.1     J12.82       2. Multiple sclerosis exacerbation (HCC)  G35       3. COPD exacerbation (HCC)  J44.1           Medical Decision Making     Complexity of Problems Addressed:  1 or more acute illnesses that pose a threat to life or bodily function.     Data Reviewed and Analyzed:   I independently ordered and reviewed each unique test.  I reviewed external records: provider visit note from outside specialist.  Discharge summary from admission for cellulitis.  Patient at that time was bedbound from MS.      I independently ordered and interpreted the ED EKG in the absence of a Cardiologist.    Rate: 98  EKG Interpretation: EKG Interpretation: sinus rhythm, no evidence of arrhythmia  ST Segments: Nonspecific ST segments - NO STEMI    Respiratory artifact  I interpreted the X-rays viral PNA.    Discussion of management or test interpretation.  Patient's neck tests were normal.  Concern being worsening weakness associated with MS secondary to COVID.  Wheezing improved after DuoNeb was given a second treatment.  Discussed with hospitalist for admission.        The patient was admitted and I have discussed patient management with the admitting provider.  hospitalists  Risk of Complications and/or Morbidity of Patient Management:  Prescription drug management performed.  Discussion with external consultants.        Is this patient to be included in the SEP-1 core measure due to severe sepsis or septic shock? No Exclusion criteria - the patient is NOT to be included for SEP-1 Core Measure due to: Viral etiology found or highly suspected (including COVID-19) without concomitant bacterial infection      History      Clinton Beckford is a 68 y.o. male   Present with EMS for complaint of cough for 2 days and patient states

## 2024-02-08 LAB
ANION GAP SERPL CALC-SCNC: 4 MMOL/L (ref 2–11)
BASOPHILS # BLD: 0 K/UL (ref 0–0.2)
BASOPHILS NFR BLD: 0 % (ref 0–2)
BUN SERPL-MCNC: 12 MG/DL (ref 8–23)
CALCIUM SERPL-MCNC: 8.1 MG/DL (ref 8.3–10.4)
CHLORIDE SERPL-SCNC: 112 MMOL/L (ref 103–113)
CO2 SERPL-SCNC: 24 MMOL/L (ref 21–32)
CREAT SERPL-MCNC: 0.7 MG/DL (ref 0.8–1.5)
DIFFERENTIAL METHOD BLD: ABNORMAL
EOSINOPHIL # BLD: 0 K/UL (ref 0–0.8)
EOSINOPHIL NFR BLD: 0 % (ref 0.5–7.8)
ERYTHROCYTE [DISTWIDTH] IN BLOOD BY AUTOMATED COUNT: 14.8 % (ref 11.9–14.6)
GLUCOSE SERPL-MCNC: 177 MG/DL (ref 65–100)
HCT VFR BLD AUTO: 37.7 % (ref 41.1–50.3)
HGB BLD-MCNC: 12.4 G/DL (ref 13.6–17.2)
IMM GRANULOCYTES # BLD AUTO: 0 K/UL (ref 0–0.5)
IMM GRANULOCYTES NFR BLD AUTO: 0 % (ref 0–5)
LACTATE SERPL-SCNC: 1.8 MMOL/L (ref 0.4–2)
LACTATE SERPL-SCNC: 5.9 MMOL/L (ref 0.4–2)
LYMPHOCYTES # BLD: 0.6 K/UL (ref 0.5–4.6)
LYMPHOCYTES NFR BLD: 5 % (ref 13–44)
MCH RBC QN AUTO: 29.1 PG (ref 26.1–32.9)
MCHC RBC AUTO-ENTMCNC: 32.9 G/DL (ref 31.4–35)
MCV RBC AUTO: 88.5 FL (ref 82–102)
MONOCYTES # BLD: 0.2 K/UL (ref 0.1–1.3)
MONOCYTES NFR BLD: 2 % (ref 4–12)
NEUTS SEG # BLD: 9.3 K/UL (ref 1.7–8.2)
NEUTS SEG NFR BLD: 93 % (ref 43–78)
NRBC # BLD: 0 K/UL (ref 0–0.2)
PLATELET # BLD AUTO: 209 K/UL (ref 150–450)
PMV BLD AUTO: 11 FL (ref 9.4–12.3)
POTASSIUM SERPL-SCNC: 4.1 MMOL/L (ref 3.5–5.1)
RBC # BLD AUTO: 4.26 M/UL (ref 4.23–5.6)
SODIUM SERPL-SCNC: 140 MMOL/L (ref 136–146)
WBC # BLD AUTO: 10.1 K/UL (ref 4.3–11.1)

## 2024-02-08 PROCEDURE — 36415 COLL VENOUS BLD VENIPUNCTURE: CPT

## 2024-02-08 PROCEDURE — 2580000003 HC RX 258: Performed by: HOSPITALIST

## 2024-02-08 PROCEDURE — A4216 STERILE WATER/SALINE, 10 ML: HCPCS | Performed by: HOSPITALIST

## 2024-02-08 PROCEDURE — 6370000000 HC RX 637 (ALT 250 FOR IP): Performed by: HOSPITALIST

## 2024-02-08 PROCEDURE — 2500000003 HC RX 250 WO HCPCS: Performed by: HOSPITALIST

## 2024-02-08 PROCEDURE — 1100000000 HC RM PRIVATE

## 2024-02-08 PROCEDURE — 80048 BASIC METABOLIC PNL TOTAL CA: CPT

## 2024-02-08 PROCEDURE — 6360000002 HC RX W HCPCS: Performed by: HOSPITALIST

## 2024-02-08 PROCEDURE — 83605 ASSAY OF LACTIC ACID: CPT

## 2024-02-08 PROCEDURE — 85025 COMPLETE CBC W/AUTO DIFF WBC: CPT

## 2024-02-08 PROCEDURE — 97530 THERAPEUTIC ACTIVITIES: CPT

## 2024-02-08 PROCEDURE — 94761 N-INVAS EAR/PLS OXIMETRY MLT: CPT

## 2024-02-08 PROCEDURE — 97165 OT EVAL LOW COMPLEX 30 MIN: CPT

## 2024-02-08 PROCEDURE — 97112 NEUROMUSCULAR REEDUCATION: CPT

## 2024-02-08 PROCEDURE — 6370000000 HC RX 637 (ALT 250 FOR IP): Performed by: PHYSICIAN ASSISTANT

## 2024-02-08 PROCEDURE — 2580000003 HC RX 258: Performed by: FAMILY MEDICINE

## 2024-02-08 PROCEDURE — 97535 SELF CARE MNGMENT TRAINING: CPT

## 2024-02-08 PROCEDURE — 2700000000 HC OXYGEN THERAPY PER DAY

## 2024-02-08 PROCEDURE — 94640 AIRWAY INHALATION TREATMENT: CPT

## 2024-02-08 RX ORDER — IPRATROPIUM BROMIDE AND ALBUTEROL SULFATE 2.5; .5 MG/3ML; MG/3ML
1 SOLUTION RESPIRATORY (INHALATION)
Status: DISCONTINUED | OUTPATIENT
Start: 2024-02-08 | End: 2024-02-08

## 2024-02-08 RX ORDER — 0.9 % SODIUM CHLORIDE 0.9 %
1000 INTRAVENOUS SOLUTION INTRAVENOUS ONCE
Status: COMPLETED | OUTPATIENT
Start: 2024-02-08 | End: 2024-02-08

## 2024-02-08 RX ORDER — ALBUTEROL SULFATE 90 UG/1
2 AEROSOL, METERED RESPIRATORY (INHALATION)
Status: DISCONTINUED | OUTPATIENT
Start: 2024-02-08 | End: 2024-02-12 | Stop reason: HOSPADM

## 2024-02-08 RX ADMIN — Medication 3 MG: at 20:58

## 2024-02-08 RX ADMIN — ALBUTEROL SULFATE 2 PUFF: 90 AEROSOL, METERED RESPIRATORY (INHALATION) at 19:45

## 2024-02-08 RX ADMIN — GUAIFENESIN 600 MG: 600 TABLET ORAL at 08:43

## 2024-02-08 RX ADMIN — SODIUM CHLORIDE, PRESERVATIVE FREE 10 ML: 5 INJECTION INTRAVENOUS at 21:01

## 2024-02-08 RX ADMIN — DOXYCYCLINE 100 MG: 100 INJECTION, POWDER, LYOPHILIZED, FOR SOLUTION INTRAVENOUS at 04:02

## 2024-02-08 RX ADMIN — FAMOTIDINE 20 MG: 10 INJECTION, SOLUTION INTRAVENOUS at 20:58

## 2024-02-08 RX ADMIN — ALBUTEROL SULFATE 2 PUFF: 90 AEROSOL, METERED RESPIRATORY (INHALATION) at 07:40

## 2024-02-08 RX ADMIN — SODIUM CHLORIDE, PRESERVATIVE FREE 10 ML: 5 INJECTION INTRAVENOUS at 08:44

## 2024-02-08 RX ADMIN — ALBUTEROL SULFATE 2 PUFF: 90 AEROSOL, METERED RESPIRATORY (INHALATION) at 15:28

## 2024-02-08 RX ADMIN — FAMOTIDINE 20 MG: 10 INJECTION, SOLUTION INTRAVENOUS at 08:43

## 2024-02-08 RX ADMIN — DEXTROSE AND SODIUM CHLORIDE: 5; 450 INJECTION, SOLUTION INTRAVENOUS at 08:51

## 2024-02-08 RX ADMIN — ENOXAPARIN SODIUM 40 MG: 100 INJECTION SUBCUTANEOUS at 16:54

## 2024-02-08 RX ADMIN — DEXTROSE AND SODIUM CHLORIDE: 5; 450 INJECTION, SOLUTION INTRAVENOUS at 20:03

## 2024-02-08 RX ADMIN — DEXAMETHASONE SODIUM PHOSPHATE 6 MG: 10 INJECTION INTRAMUSCULAR; INTRAVENOUS at 00:23

## 2024-02-08 RX ADMIN — GUAIFENESIN 600 MG: 600 TABLET ORAL at 20:58

## 2024-02-08 RX ADMIN — MOMETASONE FUROATE AND FORMOTEROL FUMARATE DIHYDRATE 2 PUFF: 200; 5 AEROSOL RESPIRATORY (INHALATION) at 07:40

## 2024-02-08 RX ADMIN — MOMETASONE FUROATE AND FORMOTEROL FUMARATE DIHYDRATE 2 PUFF: 200; 5 AEROSOL RESPIRATORY (INHALATION) at 19:46

## 2024-02-08 RX ADMIN — SODIUM CHLORIDE 1000 ML: 9 INJECTION, SOLUTION INTRAVENOUS at 01:38

## 2024-02-08 RX ADMIN — ASPIRIN 325 MG: 325 TABLET, FILM COATED ORAL at 08:43

## 2024-02-08 NOTE — ACP (ADVANCE CARE PLANNING)
Advance Care Planning     General Advance Care Planning (ACP) Conversation    Date of Conversation: 2/7/2024  Conducted with: Patient with Decision Making Capacity    Healthcare Decision Maker:    Primary Decision Maker: KennedyGraciela - Brother/Sister - [phone]  Click here to complete Healthcare Decision Makers including selection of the Healthcare Decision Maker Relationship (ie \"Primary\").  Today we documented Decision Maker(s) consistent with Legal Next of Kin hierarchy.    Content/Action Overview:  Has NO ACP documents-Information provided  Reviewed DNR/DNI and patient elects Full Code (Attempt Resuscitation)        Length of Voluntary ACP Conversation in minutes:  <16 minutes (Non-Billable)    Bettye Martin RN

## 2024-02-08 NOTE — ED NOTES
TRANSFER - OUT REPORT:    Verbal report given to LICO Alcala on Clinton Beckford  being transferred to [facility] for routine progression of patient care       Report consisted of patient's Situation, Background, Assessment and   Recommendations(SBAR).     Information from the following report(s) ED SBAR was reviewed with the receiving nurse.    Lines:   Peripheral IV 02/07/24 Right Forearm (Active)   Site Assessment Clean, dry & intact 02/07/24 1430   Line Status Blood return noted 02/07/24 1430   Phlebitis Assessment No symptoms 02/07/24 1430   Infiltration Assessment 0 02/07/24 1430       Peripheral IV 02/07/24 Distal;Left;Posterior Forearm (Active)   Site Assessment Clean, dry & intact 02/07/24 1708   Line Status Blood return noted 02/07/24 1708   Phlebitis Assessment No symptoms 02/07/24 1708   Infiltration Assessment 0 02/07/24 1708        Opportunity for questions and clarification was provided.      Patient transported with:  Hoa Fang RN  02/07/24 1077

## 2024-02-08 NOTE — FLOWSHEET NOTE
02/08/24 0129   Treatment Team Notification   Reason for Communication Critical results   Type of Critical Result Laboratory   Critical Lab Information LA 5.9   Person Result Received From lab   Critical Lab Result Type Lactic acid   Name of Team Member Notified Dr. Rose Miller   Treatment Team Role Attending Provider   Method of Communication Secure Message   Response See orders

## 2024-02-08 NOTE — CARE COORDINATION
MSN, CM:  spoke with patient this afternoon about discharge planning.  Patient lives with a roommate couple (/wife) in own apartment.  Patient requires help with some ADL's and uses an electric w/c for mobility.  Patient is disabled and uses the Medicare Van for transport to all appointments.  Patient is current with [facility] but patient denies any rehab, palliative or home oxygen,  PT/OT consulted for evaluation and recommendations.  Case Management will continue to follow for any discharge needs.       02/08/24 1300   Service Assessment   Patient Orientation Alert and Oriented   Cognition Alert   History Provided By Patient   Primary Caregiver Self   Support Systems Family Members   Patient's Healthcare Decision Maker is: Legal Next of Kin   PCP Verified by CM Yes   Last Visit to PCP Within last 3 months   Prior Functional Level Independent in ADLs/IADLs   Current Functional Level Other (see comment)  (PT/OT consulted)   Can patient return to prior living arrangement Yes   Ability to make needs known: Good   Family able to assist with home care needs: Other (comment)  (friends)   Would you like for me to discuss the discharge plan with any other family members/significant others, and if so, who? Yes   Financial Resources Medicaid;Medicare   Community Resources ECF/Home Care  ([facility] )   Social/Functional History   Lives With Friend(s)   Type of Home Apartment   Home Layout One level   Home Access Level entry   Bathroom Shower/Tub Walk-in shower   Bathroom Toilet Handicap height   Bathroom Equipment Grab bars in shower;Shower chair;Hand-held shower   Bathroom Accessibility Wheelchair accessible   Home Equipment Wheelchair-electric   Receives Help From Friend(s)   ADL Assistance Needs assistance   Homemaking Assistance Needs assistance   Homemaking Responsibilities Yes   Ambulation Assistance Independent   Transfer Assistance Independent   Active  No   Patient's  Info Associa   Mode of

## 2024-02-08 NOTE — THERAPY EVALUATION
ACUTE OCCUPATIONAL THERAPY GOALS:   (Developed with and agreed upon by patient and/or caregiver.)  1. Patient will complete upper body bathing and dressing with INDEPENDENCE and adaptive equipment as needed.   2. Patient will complete self-grooming tasks in unsupported sitting with INDEPENDENCE and adaptive equipment as needed.  3. Patient will tolerate 25 minutes of OT treatment with 1-2 rest breaks to increase activity tolerance for ADLs.   4. Patient will complete functional transfers with INDEPENDENCE and adaptive equipment as needed.   5. Patient will complete functional activity while seated edge of bed with INDEPENDENCE and adaptive equipment as needed.   6. Patient will tolerate 15 minutes unsupported sitting balance with INDEPENDENCE in preparation for ADL performance.   7. Patient will tolerate 10 minutes BUE therapeutic activities to increase use of BUE during ADL performance.      Timeframe: 7 visits       OCCUPATIONAL THERAPY Initial Assessment, Daily Note, and AM       OT Visit Days: 1  Acknowledge Orders  Time  OT Charge Capture  Rehab Caseload Tracker      Clinton Beckford is a 68 y.o. male   PRIMARY DIAGNOSIS: COVID-19 virus infection  COPD exacerbation (HCC) [J44.1]  Multiple sclerosis exacerbation (HCC) [G35]  COVID-19 virus infection [U07.1]  Pneumonia due to COVID-19 virus [U07.1, J12.82]       Reason for Referral: Generalized Muscle Weakness (M62.81)  Inpatient: Payor: [employer]ICARE / Plan: [employer] G[employer] PLUS HMO / Product Type: *No Product type* /     ASSESSMENT:     REHAB RECOMMENDATIONS:   Recommendation to date pending progress:  Setting:  Home Health Therapy    Equipment:    To Be Determined--pt has power w/c, slide board, and SC at home     ASSESSMENT:  Mr. Beckford is a 68 y.o. male who presents to the hospital with difficulty breathing and dry cough for past 2-3 days. PMHx of severe peripheral arterial disease with BLE skin ulcers, RLE flexion contracture, COPD and MS.    Today,

## 2024-02-09 LAB
ANION GAP SERPL CALC-SCNC: 1 MMOL/L (ref 2–11)
BUN SERPL-MCNC: 15 MG/DL (ref 8–23)
CALCIUM SERPL-MCNC: 8.2 MG/DL (ref 8.3–10.4)
CHLORIDE SERPL-SCNC: 114 MMOL/L (ref 103–113)
CO2 SERPL-SCNC: 25 MMOL/L (ref 21–32)
CREAT SERPL-MCNC: 0.7 MG/DL (ref 0.8–1.5)
GLUCOSE SERPL-MCNC: 128 MG/DL (ref 65–100)
POTASSIUM SERPL-SCNC: 3.9 MMOL/L (ref 3.5–5.1)
SODIUM SERPL-SCNC: 140 MMOL/L (ref 136–146)

## 2024-02-09 PROCEDURE — 2700000000 HC OXYGEN THERAPY PER DAY

## 2024-02-09 PROCEDURE — 94761 N-INVAS EAR/PLS OXIMETRY MLT: CPT

## 2024-02-09 PROCEDURE — 2500000003 HC RX 250 WO HCPCS: Performed by: HOSPITALIST

## 2024-02-09 PROCEDURE — 6360000002 HC RX W HCPCS: Performed by: HOSPITALIST

## 2024-02-09 PROCEDURE — 1100000000 HC RM PRIVATE

## 2024-02-09 PROCEDURE — 6370000000 HC RX 637 (ALT 250 FOR IP): Performed by: INTERNAL MEDICINE

## 2024-02-09 PROCEDURE — 36415 COLL VENOUS BLD VENIPUNCTURE: CPT

## 2024-02-09 PROCEDURE — 6370000000 HC RX 637 (ALT 250 FOR IP): Performed by: HOSPITALIST

## 2024-02-09 PROCEDURE — 94640 AIRWAY INHALATION TREATMENT: CPT

## 2024-02-09 PROCEDURE — A4216 STERILE WATER/SALINE, 10 ML: HCPCS | Performed by: HOSPITALIST

## 2024-02-09 PROCEDURE — 80048 BASIC METABOLIC PNL TOTAL CA: CPT

## 2024-02-09 PROCEDURE — 2580000003 HC RX 258: Performed by: HOSPITALIST

## 2024-02-09 RX ORDER — BENZONATATE 100 MG/1
200 CAPSULE ORAL 3 TIMES DAILY PRN
Status: DISCONTINUED | OUTPATIENT
Start: 2024-02-09 | End: 2024-02-12 | Stop reason: HOSPADM

## 2024-02-09 RX ADMIN — ALBUTEROL SULFATE 2 PUFF: 90 AEROSOL, METERED RESPIRATORY (INHALATION) at 07:41

## 2024-02-09 RX ADMIN — ALBUTEROL SULFATE 2 PUFF: 90 AEROSOL, METERED RESPIRATORY (INHALATION) at 19:53

## 2024-02-09 RX ADMIN — ENOXAPARIN SODIUM 40 MG: 100 INJECTION SUBCUTANEOUS at 17:35

## 2024-02-09 RX ADMIN — MOMETASONE FUROATE AND FORMOTEROL FUMARATE DIHYDRATE 2 PUFF: 200; 5 AEROSOL RESPIRATORY (INHALATION) at 19:53

## 2024-02-09 RX ADMIN — DEXAMETHASONE SODIUM PHOSPHATE 6 MG: 10 INJECTION INTRAMUSCULAR; INTRAVENOUS at 03:31

## 2024-02-09 RX ADMIN — ALBUTEROL SULFATE 2 PUFF: 90 AEROSOL, METERED RESPIRATORY (INHALATION) at 15:56

## 2024-02-09 RX ADMIN — SODIUM CHLORIDE, PRESERVATIVE FREE 10 ML: 5 INJECTION INTRAVENOUS at 09:18

## 2024-02-09 RX ADMIN — ASPIRIN 325 MG: 325 TABLET, FILM COATED ORAL at 09:18

## 2024-02-09 RX ADMIN — BENZONATATE 200 MG: 100 CAPSULE ORAL at 12:52

## 2024-02-09 RX ADMIN — GUAIFENESIN 600 MG: 600 TABLET ORAL at 21:14

## 2024-02-09 RX ADMIN — SODIUM CHLORIDE, PRESERVATIVE FREE 10 ML: 5 INJECTION INTRAVENOUS at 21:14

## 2024-02-09 RX ADMIN — Medication 3 MG: at 21:14

## 2024-02-09 RX ADMIN — MOMETASONE FUROATE AND FORMOTEROL FUMARATE DIHYDRATE 2 PUFF: 200; 5 AEROSOL RESPIRATORY (INHALATION) at 07:41

## 2024-02-09 RX ADMIN — FAMOTIDINE 20 MG: 10 INJECTION, SOLUTION INTRAVENOUS at 21:13

## 2024-02-09 RX ADMIN — FAMOTIDINE 20 MG: 10 INJECTION, SOLUTION INTRAVENOUS at 09:18

## 2024-02-09 RX ADMIN — GUAIFENESIN 600 MG: 600 TABLET ORAL at 09:18

## 2024-02-09 NOTE — ADT AUTH CERT
95 %   02/07/24 1500 -- 97 23 127/66 94 %   02/07/24 1445 -- 93 20 113/70 95 %   02/07/24 1431 -- (!) 105 14 134/84 92 %   02/07/24 1430 -- 98 19 134/84 90 %   02/07/24 1415 -- 100 23 134/77 93 %   02/07/24 1408 98.3 °F (36.8 °C) (!) 101 22 (!) 140/84 93 %         Oxygen Therapy  SpO2: 97 %  Pulse via Oximetry: 92 beats per minute  Pulse Oximeter Device Mode: Intermittent  O2 Device: Nasal cannula  O2 Flow Rate (L/min): 3 L/min  Blood Gas  Performed?: Yes  Jeferson's Test #1: Not assessed  Site #1: Vein  Complications #1: None  Post-procedure #1: Standard  Specimen Status #1: Point of care  How Tolerated?: Tolerated well     Estimated body mass index is 20.19 kg/m² as calculated from the following:    Height as of this encounter: 1.6 m (5' 3\").    Weight as of this encounter: 51.7 kg (114 lb).     Intake/Output Summary (Last 24 hours) at 2/8/2024 1223  Last data filed at 2/8/2024 0747      Gross per 24 hour   Intake 2826.25 ml   Output 1050 ml   Net 1776.25 ml          Physical Exam:   General:          Well nourished.  No acute distress.  Resting comfortably in bed  Head:               Normocephalic, atraumatic  Eyes:               Sclerae appear normal.  Pupils equally round.  ENT:                Nares appear normal.  Moist oral mucosa  Neck:               No restricted ROM.  Trachea midline   CV:                  RRR.  No m/r/g.  No jugular venous distension.  Lungs:             Decreased air entry bilaterally but no audible wheezing.  Abdomen:        Soft, nontender, nondistended.  Extremities:     Bilateral lower extremity contractures.  Wounds noted on dorsal aspect of bilateral feet.  No open sores.  Skin:                No rashes.  Normal coloration.   Warm and dry.    Neuro:             CN II-XII grossly intact.    Psych:             Normal mood and affect.       I have personally reviewed labs and tests:  Recent Labs:  Recent Results          Recent Results (from the past 48 hour(s))   EKG 12 Lead

## 2024-02-09 NOTE — WOUND CARE
Patient was to see wound clinic next week, has covid and in hospital and asks for help with wounds.  Last wound clinic appointment had been told wounds almost healed and to use Vaseline daily after bathing. Vaseline not available will use Aquaphor.  The areas are virtually unchanged from pictures from wound clinic and agree Aquaphor or Vaseline is appropriate

## 2024-02-09 NOTE — ACP (ADVANCE CARE PLANNING)
Advance Care Planning     Advance Care Planning Inpatient Note  [facility] Department    Today's Date: 2/8/2024  Unit: [facility] 8 MED SURG    Received request from IDT Member.  Upon review of chart and communication with care team, patient's decision making abilities are not in question.. Patient was/were present in the room during visit.    Goals of ACP Conversation:  Discuss advance care planning documents    Health Care Decision Makers:     No healthcare decision makers have been documented.  Click here to complete HealthCare Decision Makers including selection of the Healthcare Decision Maker Relationship (ie \"Primary\")  Summary:  No Decision Maker named by patient at this time    Advance Care Planning Documents (Patient Wishes):  None     Assessment:  Patient expressed desire to complete HCPOA to name his roommate as primary agent and sister as secondary.  Patient was able to complete paperwork but no one was available to witness as patient is on COVID isolation.   plans to follow up tomorrow to assist in witnessing and filing documentation.    Interventions:  Provided education on documents for clarity and greater understanding  Discussed and provided education on state decision maker hierarchy  Encouraged ongoing ACP conversation with future decision makers and loved ones  Reviewed but did not complete ACP document    Care Preferences Communicated:   No    Outcomes/Plan:  ACP Discussion: Postponed    Electronically signed by Chaplain Evy on 2/8/2024 at 7:15 PM

## 2024-02-09 NOTE — CARE COORDINATION
MSN, CM:  patient continues on 2L NC with room air at baseline.  Patient will be discharged with [facility] when medically stable.  Case Management will continue to follow.

## 2024-02-10 LAB
ALBUMIN SERPL-MCNC: 2.8 G/DL (ref 3.2–4.6)
ALBUMIN/GLOB SERPL: 0.8 (ref 0.4–1.6)
ALP SERPL-CCNC: 72 U/L (ref 50–136)
ALT SERPL-CCNC: 19 U/L (ref 12–65)
ANION GAP SERPL CALC-SCNC: 5 MMOL/L (ref 2–11)
AST SERPL-CCNC: 12 U/L (ref 15–37)
BASOPHILS # BLD: 0 K/UL (ref 0–0.2)
BASOPHILS NFR BLD: 0 % (ref 0–2)
BILIRUB SERPL-MCNC: 0.5 MG/DL (ref 0.2–1.1)
BUN SERPL-MCNC: 16 MG/DL (ref 8–23)
CALCIUM SERPL-MCNC: 8.8 MG/DL (ref 8.3–10.4)
CHLORIDE SERPL-SCNC: 109 MMOL/L (ref 103–113)
CO2 SERPL-SCNC: 25 MMOL/L (ref 21–32)
CREAT SERPL-MCNC: 0.6 MG/DL (ref 0.8–1.5)
CRP SERPL-MCNC: 4.4 MG/DL (ref 0–0.9)
DIFFERENTIAL METHOD BLD: ABNORMAL
EOSINOPHIL # BLD: 0 K/UL (ref 0–0.8)
EOSINOPHIL NFR BLD: 0 % (ref 0.5–7.8)
ERYTHROCYTE [DISTWIDTH] IN BLOOD BY AUTOMATED COUNT: 15 % (ref 11.9–14.6)
GLOBULIN SER CALC-MCNC: 3.5 G/DL (ref 2.8–4.5)
GLUCOSE SERPL-MCNC: 132 MG/DL (ref 65–100)
HCT VFR BLD AUTO: 37.5 % (ref 41.1–50.3)
HGB BLD-MCNC: 12.1 G/DL (ref 13.6–17.2)
IMM GRANULOCYTES # BLD AUTO: 0.1 K/UL (ref 0–0.5)
IMM GRANULOCYTES NFR BLD AUTO: 1 % (ref 0–5)
LYMPHOCYTES # BLD: 0.6 K/UL (ref 0.5–4.6)
LYMPHOCYTES NFR BLD: 7 % (ref 13–44)
MCH RBC QN AUTO: 29 PG (ref 26.1–32.9)
MCHC RBC AUTO-ENTMCNC: 32.3 G/DL (ref 31.4–35)
MCV RBC AUTO: 89.9 FL (ref 82–102)
MONOCYTES # BLD: 0.6 K/UL (ref 0.1–1.3)
MONOCYTES NFR BLD: 6 % (ref 4–12)
NEUTS SEG # BLD: 8.4 K/UL (ref 1.7–8.2)
NEUTS SEG NFR BLD: 86 % (ref 43–78)
NRBC # BLD: 0 K/UL (ref 0–0.2)
PLATELET # BLD AUTO: 240 K/UL (ref 150–450)
PMV BLD AUTO: 11.8 FL (ref 9.4–12.3)
POTASSIUM SERPL-SCNC: 3.9 MMOL/L (ref 3.5–5.1)
PROT SERPL-MCNC: 6.3 G/DL (ref 6.3–8.2)
RBC # BLD AUTO: 4.17 M/UL (ref 4.23–5.6)
SODIUM SERPL-SCNC: 139 MMOL/L (ref 136–146)
WBC # BLD AUTO: 9.6 K/UL (ref 4.3–11.1)

## 2024-02-10 PROCEDURE — 36415 COLL VENOUS BLD VENIPUNCTURE: CPT

## 2024-02-10 PROCEDURE — 85025 COMPLETE CBC W/AUTO DIFF WBC: CPT

## 2024-02-10 PROCEDURE — 6360000002 HC RX W HCPCS: Performed by: HOSPITALIST

## 2024-02-10 PROCEDURE — 94761 N-INVAS EAR/PLS OXIMETRY MLT: CPT

## 2024-02-10 PROCEDURE — 94664 DEMO&/EVAL PT USE INHALER: CPT

## 2024-02-10 PROCEDURE — 86140 C-REACTIVE PROTEIN: CPT

## 2024-02-10 PROCEDURE — 2580000003 HC RX 258: Performed by: HOSPITALIST

## 2024-02-10 PROCEDURE — 94760 N-INVAS EAR/PLS OXIMETRY 1: CPT

## 2024-02-10 PROCEDURE — 80053 COMPREHEN METABOLIC PANEL: CPT

## 2024-02-10 PROCEDURE — 2700000000 HC OXYGEN THERAPY PER DAY

## 2024-02-10 PROCEDURE — 1100000000 HC RM PRIVATE

## 2024-02-10 PROCEDURE — 2500000003 HC RX 250 WO HCPCS: Performed by: HOSPITALIST

## 2024-02-10 PROCEDURE — 6370000000 HC RX 637 (ALT 250 FOR IP): Performed by: HOSPITALIST

## 2024-02-10 PROCEDURE — 6370000000 HC RX 637 (ALT 250 FOR IP): Performed by: INTERNAL MEDICINE

## 2024-02-10 PROCEDURE — 94640 AIRWAY INHALATION TREATMENT: CPT

## 2024-02-10 PROCEDURE — A4216 STERILE WATER/SALINE, 10 ML: HCPCS | Performed by: HOSPITALIST

## 2024-02-10 RX ADMIN — BENZONATATE 200 MG: 100 CAPSULE ORAL at 09:13

## 2024-02-10 RX ADMIN — FAMOTIDINE 20 MG: 10 INJECTION, SOLUTION INTRAVENOUS at 20:51

## 2024-02-10 RX ADMIN — ALBUTEROL SULFATE 2 PUFF: 90 AEROSOL, METERED RESPIRATORY (INHALATION) at 13:47

## 2024-02-10 RX ADMIN — ALBUTEROL SULFATE 2 PUFF: 90 AEROSOL, METERED RESPIRATORY (INHALATION) at 20:00

## 2024-02-10 RX ADMIN — SODIUM CHLORIDE, PRESERVATIVE FREE 10 ML: 5 INJECTION INTRAVENOUS at 20:52

## 2024-02-10 RX ADMIN — ALBUTEROL SULFATE 2 PUFF: 90 AEROSOL, METERED RESPIRATORY (INHALATION) at 08:22

## 2024-02-10 RX ADMIN — ASPIRIN 325 MG: 325 TABLET, FILM COATED ORAL at 08:59

## 2024-02-10 RX ADMIN — MOMETASONE FUROATE AND FORMOTEROL FUMARATE DIHYDRATE 2 PUFF: 200; 5 AEROSOL RESPIRATORY (INHALATION) at 08:22

## 2024-02-10 RX ADMIN — ENOXAPARIN SODIUM 40 MG: 100 INJECTION SUBCUTANEOUS at 17:10

## 2024-02-10 RX ADMIN — MOMETASONE FUROATE AND FORMOTEROL FUMARATE DIHYDRATE 2 PUFF: 200; 5 AEROSOL RESPIRATORY (INHALATION) at 19:59

## 2024-02-10 RX ADMIN — FAMOTIDINE 20 MG: 10 INJECTION, SOLUTION INTRAVENOUS at 08:59

## 2024-02-10 RX ADMIN — Medication: at 13:45

## 2024-02-10 RX ADMIN — GUAIFENESIN 600 MG: 600 TABLET ORAL at 08:59

## 2024-02-10 RX ADMIN — Medication 3 MG: at 20:51

## 2024-02-10 RX ADMIN — SODIUM CHLORIDE, PRESERVATIVE FREE 10 ML: 5 INJECTION INTRAVENOUS at 09:00

## 2024-02-10 RX ADMIN — DEXAMETHASONE SODIUM PHOSPHATE 6 MG: 10 INJECTION INTRAMUSCULAR; INTRAVENOUS at 00:11

## 2024-02-10 RX ADMIN — GUAIFENESIN 600 MG: 600 TABLET ORAL at 20:51

## 2024-02-10 NOTE — PLAN OF CARE
Problem: Discharge Planning  Goal: Discharge to home or other facility with appropriate resources  Outcome: Progressing  Flowsheets (Taken 2/9/2024 1941 by Clare Mills, RN)  Discharge to home or other facility with appropriate resources:   Identify barriers to discharge with patient and caregiver   Arrange for needed discharge resources and transportation as appropriate   Identify discharge learning needs (meds, wound care, etc)   Arrange for interpreters to assist at discharge as needed   Refer to discharge planning if patient needs post-hospital services based on physician order or complex needs related to functional status, cognitive ability or social support system     Problem: Pain  Goal: Verbalizes/displays adequate comfort level or baseline comfort level  Outcome: Progressing  Flowsheets (Taken 2/9/2024 1923 by Clare Mills, RN)  Verbalizes/displays adequate comfort level or baseline comfort level: Encourage patient to monitor pain and request assistance     Problem: Skin/Tissue Integrity  Goal: Absence of new skin breakdown  Description: 1.  Monitor for areas of redness and/or skin breakdown  2.  Assess vascular access sites hourly  3.  Every 4-6 hours minimum:  Change oxygen saturation probe site  4.  Every 4-6 hours:  If on nasal continuous positive airway pressure, respiratory therapy assess nares and determine need for appliance change or resting period.  Outcome: Progressing     Problem: Safety - Adult  Goal: Free from fall injury  Outcome: Progressing     Problem: ABCDS Injury Assessment  Goal: Absence of physical injury  Outcome: Progressing

## 2024-02-11 LAB
ALBUMIN SERPL-MCNC: 2.7 G/DL (ref 3.2–4.6)
ALBUMIN/GLOB SERPL: 0.7 (ref 0.4–1.6)
ALP SERPL-CCNC: 71 U/L (ref 50–136)
ALT SERPL-CCNC: 17 U/L (ref 12–65)
ANION GAP SERPL CALC-SCNC: 5 MMOL/L (ref 2–11)
AST SERPL-CCNC: 10 U/L (ref 15–37)
BASOPHILS # BLD: 0 K/UL (ref 0–0.2)
BASOPHILS NFR BLD: 0 % (ref 0–2)
BILIRUB SERPL-MCNC: 0.3 MG/DL (ref 0.2–1.1)
BUN SERPL-MCNC: 24 MG/DL (ref 8–23)
CALCIUM SERPL-MCNC: 8.4 MG/DL (ref 8.3–10.4)
CHLORIDE SERPL-SCNC: 110 MMOL/L (ref 103–113)
CO2 SERPL-SCNC: 25 MMOL/L (ref 21–32)
CREAT SERPL-MCNC: 0.6 MG/DL (ref 0.8–1.5)
DIFFERENTIAL METHOD BLD: ABNORMAL
EOSINOPHIL # BLD: 0 K/UL (ref 0–0.8)
EOSINOPHIL NFR BLD: 0 % (ref 0.5–7.8)
ERYTHROCYTE [DISTWIDTH] IN BLOOD BY AUTOMATED COUNT: 15 % (ref 11.9–14.6)
GLOBULIN SER CALC-MCNC: 3.8 G/DL (ref 2.8–4.5)
GLUCOSE SERPL-MCNC: 136 MG/DL (ref 65–100)
HCT VFR BLD AUTO: 39.8 % (ref 41.1–50.3)
HGB BLD-MCNC: 12.7 G/DL (ref 13.6–17.2)
IMM GRANULOCYTES # BLD AUTO: 0.1 K/UL (ref 0–0.5)
IMM GRANULOCYTES NFR BLD AUTO: 1 % (ref 0–5)
LYMPHOCYTES # BLD: 0.9 K/UL (ref 0.5–4.6)
LYMPHOCYTES NFR BLD: 9 % (ref 13–44)
MCH RBC QN AUTO: 28.5 PG (ref 26.1–32.9)
MCHC RBC AUTO-ENTMCNC: 31.9 G/DL (ref 31.4–35)
MCV RBC AUTO: 89.4 FL (ref 82–102)
MONOCYTES # BLD: 0.6 K/UL (ref 0.1–1.3)
MONOCYTES NFR BLD: 6 % (ref 4–12)
NEUTS SEG # BLD: 8.5 K/UL (ref 1.7–8.2)
NEUTS SEG NFR BLD: 84 % (ref 43–78)
NRBC # BLD: 0 K/UL (ref 0–0.2)
PLATELET # BLD AUTO: 277 K/UL (ref 150–450)
PMV BLD AUTO: 11.5 FL (ref 9.4–12.3)
POTASSIUM SERPL-SCNC: 4.3 MMOL/L (ref 3.5–5.1)
PROT SERPL-MCNC: 6.5 G/DL (ref 6.3–8.2)
RBC # BLD AUTO: 4.45 M/UL (ref 4.23–5.6)
SODIUM SERPL-SCNC: 140 MMOL/L (ref 136–146)
WBC # BLD AUTO: 10.1 K/UL (ref 4.3–11.1)

## 2024-02-11 PROCEDURE — 6360000002 HC RX W HCPCS: Performed by: HOSPITALIST

## 2024-02-11 PROCEDURE — 2580000003 HC RX 258: Performed by: HOSPITALIST

## 2024-02-11 PROCEDURE — 36415 COLL VENOUS BLD VENIPUNCTURE: CPT

## 2024-02-11 PROCEDURE — 85025 COMPLETE CBC W/AUTO DIFF WBC: CPT

## 2024-02-11 PROCEDURE — 1100000000 HC RM PRIVATE

## 2024-02-11 PROCEDURE — 80053 COMPREHEN METABOLIC PANEL: CPT

## 2024-02-11 PROCEDURE — 94760 N-INVAS EAR/PLS OXIMETRY 1: CPT

## 2024-02-11 PROCEDURE — 2700000000 HC OXYGEN THERAPY PER DAY

## 2024-02-11 PROCEDURE — 2500000003 HC RX 250 WO HCPCS: Performed by: HOSPITALIST

## 2024-02-11 PROCEDURE — 94640 AIRWAY INHALATION TREATMENT: CPT

## 2024-02-11 PROCEDURE — 6370000000 HC RX 637 (ALT 250 FOR IP): Performed by: HOSPITALIST

## 2024-02-11 PROCEDURE — 6360000002 HC RX W HCPCS: Performed by: INTERNAL MEDICINE

## 2024-02-11 PROCEDURE — 94761 N-INVAS EAR/PLS OXIMETRY MLT: CPT

## 2024-02-11 PROCEDURE — A4216 STERILE WATER/SALINE, 10 ML: HCPCS | Performed by: HOSPITALIST

## 2024-02-11 RX ORDER — DEXAMETHASONE 4 MG/1
4 TABLET ORAL DAILY
Status: DISCONTINUED | OUTPATIENT
Start: 2024-02-11 | End: 2024-02-12 | Stop reason: HOSPADM

## 2024-02-11 RX ADMIN — FAMOTIDINE 20 MG: 10 INJECTION, SOLUTION INTRAVENOUS at 08:58

## 2024-02-11 RX ADMIN — DEXAMETHASONE 4 MG: 4 TABLET ORAL at 14:22

## 2024-02-11 RX ADMIN — ASPIRIN 325 MG: 325 TABLET, FILM COATED ORAL at 08:58

## 2024-02-11 RX ADMIN — Medication 3 MG: at 21:54

## 2024-02-11 RX ADMIN — MOMETASONE FUROATE AND FORMOTEROL FUMARATE DIHYDRATE 2 PUFF: 200; 5 AEROSOL RESPIRATORY (INHALATION) at 08:15

## 2024-02-11 RX ADMIN — SODIUM CHLORIDE, PRESERVATIVE FREE 10 ML: 5 INJECTION INTRAVENOUS at 08:59

## 2024-02-11 RX ADMIN — ALBUTEROL SULFATE 2 PUFF: 90 AEROSOL, METERED RESPIRATORY (INHALATION) at 08:15

## 2024-02-11 RX ADMIN — SODIUM CHLORIDE, PRESERVATIVE FREE 10 ML: 5 INJECTION INTRAVENOUS at 21:54

## 2024-02-11 RX ADMIN — GUAIFENESIN 600 MG: 600 TABLET ORAL at 08:58

## 2024-02-11 RX ADMIN — ALBUTEROL SULFATE 2 PUFF: 90 AEROSOL, METERED RESPIRATORY (INHALATION) at 19:51

## 2024-02-11 RX ADMIN — ALBUTEROL SULFATE 2 PUFF: 90 AEROSOL, METERED RESPIRATORY (INHALATION) at 13:39

## 2024-02-11 RX ADMIN — Medication: at 22:05

## 2024-02-11 RX ADMIN — DEXAMETHASONE SODIUM PHOSPHATE 6 MG: 10 INJECTION INTRAMUSCULAR; INTRAVENOUS at 00:21

## 2024-02-11 RX ADMIN — ENOXAPARIN SODIUM 40 MG: 100 INJECTION SUBCUTANEOUS at 18:07

## 2024-02-11 RX ADMIN — MOMETASONE FUROATE AND FORMOTEROL FUMARATE DIHYDRATE 2 PUFF: 200; 5 AEROSOL RESPIRATORY (INHALATION) at 19:51

## 2024-02-11 RX ADMIN — GUAIFENESIN 600 MG: 600 TABLET ORAL at 21:54

## 2024-02-11 RX ADMIN — FAMOTIDINE 20 MG: 10 INJECTION, SOLUTION INTRAVENOUS at 21:53

## 2024-02-11 NOTE — PLAN OF CARE
Problem: Respiratory - Adult  Goal: Achieves optimal ventilation and oxygenation  2/11/2024 1343 by Ricardo Camarillo, RCP  Outcome: Progressing  Flowsheets (Taken 2/11/2024 1341)  Achieves optimal ventilation and oxygenation:   Assess for changes in respiratory status   Position to facilitate oxygenation and minimize respiratory effort   Assess and instruct to report shortness of breath or any respiratory difficulty   Respiratory therapy support as indicated  2/11/2024 0820 by Ricardo Camarillo, RCP  Outcome: Progressing  Flowsheets (Taken 2/10/2024 2051 by Clare Mills, RN)  Achieves optimal ventilation and oxygenation: Assess for changes in respiratory status

## 2024-02-11 NOTE — PLAN OF CARE
Problem: Respiratory - Adult  Goal: Achieves optimal ventilation and oxygenation  Outcome: Progressing  Flowsheets (Taken 2/10/2024 2051 by Clare Mills RN)  Achieves optimal ventilation and oxygenation: Assess for changes in respiratory status

## 2024-02-12 VITALS
WEIGHT: 114 LBS | OXYGEN SATURATION: 92 % | SYSTOLIC BLOOD PRESSURE: 128 MMHG | TEMPERATURE: 97.7 F | BODY MASS INDEX: 20.2 KG/M2 | RESPIRATION RATE: 18 BRPM | DIASTOLIC BLOOD PRESSURE: 67 MMHG | HEIGHT: 63 IN | HEART RATE: 77 BPM

## 2024-02-12 LAB
ALBUMIN SERPL-MCNC: 2.6 G/DL (ref 3.2–4.6)
ALBUMIN/GLOB SERPL: 0.7 (ref 0.4–1.6)
ALP SERPL-CCNC: 72 U/L (ref 50–136)
ALT SERPL-CCNC: 15 U/L (ref 12–65)
ANION GAP SERPL CALC-SCNC: 6 MMOL/L (ref 2–11)
AST SERPL-CCNC: 8 U/L (ref 15–37)
BACTERIA SPEC CULT: NORMAL
BACTERIA SPEC CULT: NORMAL
BASOPHILS # BLD: 0 K/UL (ref 0–0.2)
BASOPHILS NFR BLD: 0 % (ref 0–2)
BILIRUB SERPL-MCNC: 0.2 MG/DL (ref 0.2–1.1)
BUN SERPL-MCNC: 25 MG/DL (ref 8–23)
CALCIUM SERPL-MCNC: 9 MG/DL (ref 8.3–10.4)
CHLORIDE SERPL-SCNC: 110 MMOL/L (ref 103–113)
CO2 SERPL-SCNC: 24 MMOL/L (ref 21–32)
CREAT SERPL-MCNC: 0.6 MG/DL (ref 0.8–1.5)
DIFFERENTIAL METHOD BLD: ABNORMAL
EOSINOPHIL # BLD: 0 K/UL (ref 0–0.8)
EOSINOPHIL NFR BLD: 0 % (ref 0.5–7.8)
ERYTHROCYTE [DISTWIDTH] IN BLOOD BY AUTOMATED COUNT: 14.6 % (ref 11.9–14.6)
GLOBULIN SER CALC-MCNC: 3.7 G/DL (ref 2.8–4.5)
GLUCOSE SERPL-MCNC: 167 MG/DL (ref 65–100)
HCT VFR BLD AUTO: 40.5 % (ref 41.1–50.3)
HGB BLD-MCNC: 13.3 G/DL (ref 13.6–17.2)
IMM GRANULOCYTES # BLD AUTO: 0.1 K/UL (ref 0–0.5)
IMM GRANULOCYTES NFR BLD AUTO: 2 % (ref 0–5)
LYMPHOCYTES # BLD: 1.6 K/UL (ref 0.5–4.6)
LYMPHOCYTES NFR BLD: 16 % (ref 13–44)
MCH RBC QN AUTO: 29.4 PG (ref 26.1–32.9)
MCHC RBC AUTO-ENTMCNC: 32.8 G/DL (ref 31.4–35)
MCV RBC AUTO: 89.4 FL (ref 82–102)
MONOCYTES # BLD: 0.8 K/UL (ref 0.1–1.3)
MONOCYTES NFR BLD: 8 % (ref 4–12)
NEUTS SEG # BLD: 7.1 K/UL (ref 1.7–8.2)
NEUTS SEG NFR BLD: 74 % (ref 43–78)
NRBC # BLD: 0 K/UL (ref 0–0.2)
PLATELET # BLD AUTO: 291 K/UL (ref 150–450)
PMV BLD AUTO: 10.9 FL (ref 9.4–12.3)
POTASSIUM SERPL-SCNC: 3.7 MMOL/L (ref 3.5–5.1)
PROT SERPL-MCNC: 6.3 G/DL (ref 6.3–8.2)
RBC # BLD AUTO: 4.53 M/UL (ref 4.23–5.6)
SERVICE CMNT-IMP: NORMAL
SERVICE CMNT-IMP: NORMAL
SODIUM SERPL-SCNC: 140 MMOL/L (ref 136–146)
WBC # BLD AUTO: 9.6 K/UL (ref 4.3–11.1)

## 2024-02-12 PROCEDURE — 6370000000 HC RX 637 (ALT 250 FOR IP): Performed by: HOSPITALIST

## 2024-02-12 PROCEDURE — 2500000003 HC RX 250 WO HCPCS: Performed by: HOSPITALIST

## 2024-02-12 PROCEDURE — 94640 AIRWAY INHALATION TREATMENT: CPT

## 2024-02-12 PROCEDURE — 85025 COMPLETE CBC W/AUTO DIFF WBC: CPT

## 2024-02-12 PROCEDURE — 94761 N-INVAS EAR/PLS OXIMETRY MLT: CPT

## 2024-02-12 PROCEDURE — 80053 COMPREHEN METABOLIC PANEL: CPT

## 2024-02-12 PROCEDURE — 6360000002 HC RX W HCPCS: Performed by: INTERNAL MEDICINE

## 2024-02-12 PROCEDURE — 36415 COLL VENOUS BLD VENIPUNCTURE: CPT

## 2024-02-12 PROCEDURE — A4216 STERILE WATER/SALINE, 10 ML: HCPCS | Performed by: HOSPITALIST

## 2024-02-12 PROCEDURE — 2580000003 HC RX 258: Performed by: HOSPITALIST

## 2024-02-12 RX ORDER — DEXAMETHASONE 1 MG
TABLET ORAL
Qty: 20 TABLET | Refills: 0 | Status: SHIPPED | OUTPATIENT
Start: 2024-02-12

## 2024-02-12 RX ADMIN — SODIUM CHLORIDE, PRESERVATIVE FREE 10 ML: 5 INJECTION INTRAVENOUS at 08:38

## 2024-02-12 RX ADMIN — DEXAMETHASONE 4 MG: 4 TABLET ORAL at 09:22

## 2024-02-12 RX ADMIN — GUAIFENESIN 600 MG: 600 TABLET ORAL at 08:37

## 2024-02-12 RX ADMIN — ALBUTEROL SULFATE 2 PUFF: 90 AEROSOL, METERED RESPIRATORY (INHALATION) at 08:06

## 2024-02-12 RX ADMIN — MOMETASONE FUROATE AND FORMOTEROL FUMARATE DIHYDRATE 2 PUFF: 200; 5 AEROSOL RESPIRATORY (INHALATION) at 08:06

## 2024-02-12 RX ADMIN — ASPIRIN 325 MG: 325 TABLET, FILM COATED ORAL at 08:37

## 2024-02-12 RX ADMIN — FAMOTIDINE 20 MG: 10 INJECTION, SOLUTION INTRAVENOUS at 08:38

## 2024-02-12 NOTE — DISCHARGE SUMMARY
Hospitalist Discharge Summary   Admit Date:  2024  2:04 PM   DC Note date: 2024  Name:  Clinton Beckford   Age:  68 y.o.  Sex:  male  :  1955   MRN:  [id number]   Room:  [facility]  PCP:  Negrito Roman MD    Presenting Complaint: Cough, Trouble with expiration, and Possible covid positive     Initial Admission Diagnosis: COPD exacerbation (HCC) [J44.1]  Multiple sclerosis exacerbation (HCC) [G35]  COVID-19 virus infection [U07.1]  Pneumonia due to COVID-19 virus [U07.1, J12.82]     Problem List for this Hospitalization (present on admission):    Principal Problem:    COVID-19 virus infection  Active Problems:    Skin ulcers of foot, bilateral (HCC)    COPD (chronic obstructive pulmonary disease) (HCC)    MS (multiple sclerosis) (HCC)    PAD (peripheral artery disease) (HCC)    Functional quadriplegia (HCC)    Pneumonia due to COVID-19 virus  Resolved Problems:    * No resolved hospital problems. *      Hospital Course:    Clinton Beckford is a 68 y.o. male with medical history of severe peripheral arterial disease with bilateral lower extremity skin ulcers with poor healing (follows up with Dr. Carrillo Daniel), right lower extremity flexion contracture (patient is wheelchair-bound and has not walked in more than 4 years), COPD, multiple sclerosis, functional quadriplegia who presented with difficulty breathing and dry cough for past 2 to 3 days.  Found to have COVID-19 with pneumonia with need for oxygen therapy.  Did well with Decadron therapy wean slowly able to wean off oxygen and he appears stable for discharge home today.  He should follow-up with primary care in the next 1 week.  He did receive IV antibiotics earlier in hospital stay but no dense infiltrate or worrisome findings to suggest bacterial pneumonia and he will be discharged with Decadron only to wean over the next 8 days.  He should follow-up with primary care in the next 1 week and other providers as scheduled

## 2024-02-12 NOTE — DISCHARGE INSTRUCTIONS
DISCHARGE SUMMARY from Nurse    PATIENT INSTRUCTIONS:    What to do at Home:  Recommended activity: activity as tolerated    If you experience a return of any symptoms, please follow up with primary care provider, urgent care, or emergency department.    *  Please give a list of your current medications to your Primary Care Provider.    *  Please update this list whenever your medications are discontinued, doses are      changed, or new medications (including over-the-counter products) are added.    *  Please carry medication information at all times in case of emergency situations.    These are general instructions for a healthy lifestyle:    No smoking/ No tobacco products/ Avoid exposure to second hand smoke  Surgeon General's Warning:  Quitting smoking now greatly reduces serious risk to your health.    Obesity, smoking, and sedentary lifestyle greatly increases your risk for illness    A healthy diet, regular physical exercise & weight monitoring are important for maintaining a healthy lifestyle    You may be retaining fluid if you have a history of heart failure or if you experience any of the following symptoms:  Weight gain of 3 pounds or more overnight or 5 pounds in a week, increased swelling in our hands or feet or shortness of breath while lying flat in bed.  Please call your doctor as soon as you notice any of these symptoms; do not wait until your next office visit.        The discharge information has been reviewed with the patient.

## 2024-02-12 NOTE — CARE COORDINATION
MSN, CM:  patient to be discharged home today with [facility].  Patient and family agree with this discharge plan.  Patient has met all milestones for this admission.  [facility] to transport patient home.       02/12/24 1247   Service Assessment   Patient Orientation Alert and Oriented   Cognition Alert   Services At/After Discharge   Transition of Care Consult (CM Consult) Home Health;Other  ([facility])   Internal Home Health No   Reason Outside Agency Chosen Script used patient chose alternate agency   Services At/After Discharge PT;OT;Nursing services   Mode of Transport at Discharge BLS  ([facility])   Hospital Transport Time of Discharge 1330   Confirm Follow Up Transport Other (see comment)  (EMS)   Condition of Participation: Discharge Planning   The Plan for Transition of Care is related to the following treatment goals: Home Health and Palliative Care   The Patient and/or Patient Representative was provided with a Choice of Provider? Patient   The Patient and/Or Patient Representative agree with the Discharge Plan? Yes   Freedom of Choice list was provided with basic dialogue that supports the patient's individualized plan of care/goals, treatment preferences, and shares the quality data associated with the providers?  Yes

## 2024-02-14 ENCOUNTER — HOSPITAL ENCOUNTER (OUTPATIENT)
Dept: WOUND CARE | Age: 69
Discharge: HOME | End: 2024-02-14
Payer: MEDICARE

## 2024-02-14 VITALS
DIASTOLIC BLOOD PRESSURE: 78 MMHG | SYSTOLIC BLOOD PRESSURE: 151 MMHG | RESPIRATION RATE: 16 BRPM | HEART RATE: 79 BPM | TEMPERATURE: 98.9 F

## 2024-02-14 PROCEDURE — 99213 OFFICE O/P EST LOW 20 MIN: CPT

## 2024-02-14 NOTE — WOUND CARE
Discharge Instructions for  [facility]  [address] [place] [address]  [address] [place] [id number]  Phone [phone]   Fax [phone]      NAME:  Clinton Beckford  YOB: 1955  MEDICAL RECORD NUMBER:  [id number]  DATE:  2/14/2024    Return Appointment:   4 weeks with Christian Mercado DO    Instructions: Wounds are healed  Left foot     Apply vaseline daily and as needed for dryness then leave open to air    Wear socks at all times.    If wounds open back up and drain come back to wound center sooner      Should you experience increased redness, swelling, pain, foul odor, size of wound(s), or have a temperature over 101 degrees please contact the Wound Healing Center at [phone] or if after hours contact your primary care physician or go to the hospital emergency department.    PLEASE NOTE: IF YOU ARE UNABLE TO OBTAIN WOUND SUPPLIES, CONTINUE TO USE THE SUPPLIES YOU HAVE AVAILABLE UNTIL YOU ARE ABLE TO REACH US. IT IS MOST IMPORTANT TO KEEP THE WOUND COVERED AT ALL TIMES.    Electronically signed YASH DICKEY RN on 2/14/2024 at 1:26 PM

## 2024-02-14 NOTE — DISCHARGE INSTRUCTIONS
Return Appointment:   4 weeks with Christian Mercado DO     Instructions: Wounds are healed  Left foot      Apply vaseline daily and as needed for dryness then leave open to air     Wear socks at all times.     If wounds open back up and drain come back to wound center sooner        Should you experience increased redness, swelling, pain, foul odor, size of wound(s), or have a temperature over 101 degrees please contact the Wound Healing Center at [phone] or if after hours contact your primary care physician or go to the hospital emergency department.     PLEASE NOTE: IF YOU ARE UNABLE TO OBTAIN WOUND SUPPLIES, CONTINUE TO USE THE SUPPLIES YOU HAVE AVAILABLE UNTIL YOU ARE ABLE TO REACH US. IT IS MOST IMPORTANT TO KEEP THE WOUND COVERED AT ALL TIMES.

## 2024-02-14 NOTE — FLOWSHEET NOTE
02/14/24 1308   Wound 08/07/23 Toe (Comment  which one) Right #3 right 2nd toe   Date First Assessed/Time First Assessed: 08/07/23 1141   Present on Original Admission: Yes  Primary Wound Type: Arterial Ulcer  Location: Toe (Comment  which one)  Wound Location Orientation: (c) Right  Wound Description (Comments): #3 right 2nd toe   Wound Image    Wound Etiology Arterial   Dressing/Treatment Open to air   Wound Length (cm) 0 cm   Wound Width (cm) 0 cm   Wound Depth (cm) 0 cm   Wound Surface Area (cm^2) 0 cm^2   Change in Wound Size % (l*w) 100   Wound Volume (cm^3) 0 cm^3   Wound Healing % 100   Wound Assessment Epithelialization   Wound 06/19/23 Foot Left;Dorsal #2   Date First Assessed/Time First Assessed: 06/19/23 1509   Present on Hospital Admission: Yes  Wound Approximate Age at First Assessment (Weeks): 52 weeks  Primary Wound Type: Arterial Ulcer  Location: Foot  Wound Location Orientation: Left;Dorsal  Woun...   Wound Image    Wound Etiology Arterial   Dressing Status Clean;Dry;Intact   Wound Cleansed Cleansed with saline   Dressing/Treatment Open to air   Wound Length (cm) 0.1 cm   Wound Width (cm) 0.1 cm   Wound Depth (cm) 0.1 cm   Wound Surface Area (cm^2) 0.01 cm^2   Change in Wound Size % (l*w) 99.96   Wound Volume (cm^3) 0.001 cm^3   Wound Healing % 100   Wound Assessment Eschar dry     Patient on aspirin daily

## 2024-02-14 NOTE — PROGRESS NOTES
Hospitalist Progress Note   Admit Date:  2024  2:04 PM   Name:  Clinton Beckford   Age:  68 y.o.  Sex:  male  :  1955   MRN:  [id number]   Room:  [facility]    Presenting/Chief Complaint: Cough, Trouble with expiration, and Possible covid positive     Reason(s) for Admission: COPD exacerbation (HCC) [J44.1]  Multiple sclerosis exacerbation (HCC) [G35]  COVID-19 virus infection [U07.1]  Pneumonia due to COVID-19 virus [U07.1, J12.82]     Hospital Course:     Copied from prior provider HPI/summary:  Clinton Beckford is a 68 y.o. male with medical history of severe peripheral arterial disease with bilateral lower extremity skin ulcers with poor healing (follows up with Dr. Carrillo Daniel), right lower extremity flexion contracture (patient is wheelchair-bound and has not walked in more than 4 years), COPD, multiple sclerosis, functional quadriplegia who presented with difficulty breathing and dry cough for past 2 to 3 days.     As per the patient and his wife, patient's roommate was tested positive for COVID today.  Patient reported that he started feeling lethargic and weak over the past 2 to 3 days with associated dry cough and shortness of breath.  Patient denies any chest pain, nausea, vomiting, fever chills or night sweats.  No abdominal pain, urinary symptoms, headache, lightheadedness.  Patient is nonambulatory at baseline and is wheelchair-bound.     VS on arrival: Tmax 98.3, RR 22, , /84, saturation of 93% on room air.  Chest x-ray with evidence of bibasilar infiltrates versus edema, no effusion noted (self read).  EKG with NSR.  Blood work was remarkable for WBC of 13 K.  Patient is is positive for COVID 19, negative for flu.     Patient was admitted to the medical floor for further workup.  He was started on IV antibiotics and Decadron.     Subjective & 24hr Events:     Attempting to wean off oxygen-slow but definite clinical improvement.  Going to try to convert to oral 
       Hospitalist Progress Note   Admit Date:  2024  2:04 PM   Name:  Clinton Beckford   Age:  68 y.o.  Sex:  male  :  1955   MRN:  [id number]   Room:  [facility]    Presenting/Chief Complaint: Cough, Trouble with expiration, and Possible covid positive     Reason(s) for Admission: COPD exacerbation (HCC) [J44.1]  Multiple sclerosis exacerbation (HCC) [G35]  COVID-19 virus infection [U07.1]  Pneumonia due to COVID-19 virus [U07.1, J12.82]     Hospital Course:     Copied from prior provider HPI/summary:  Clinton Beckford is a 68 y.o. male with medical history of severe peripheral arterial disease with bilateral lower extremity skin ulcers with poor healing (follows up with Dr. Carrillo Daniel), right lower extremity flexion contracture (patient is wheelchair-bound and has not walked in more than 4 years), COPD, multiple sclerosis, functional quadriplegia who presented with difficulty breathing and dry cough for past 2 to 3 days.     As per the patient and his wife, patient's roommate was tested positive for COVID today.  Patient reported that he started feeling lethargic and weak over the past 2 to 3 days with associated dry cough and shortness of breath.  Patient denies any chest pain, nausea, vomiting, fever chills or night sweats.  No abdominal pain, urinary symptoms, headache, lightheadedness.  Patient is nonambulatory at baseline and is wheelchair-bound.     VS on arrival: Tmax 98.3, RR 22, , /84, saturation of 93% on room air.  Chest x-ray with evidence of bibasilar infiltrates versus edema, no effusion noted (self read).  EKG with NSR.  Blood work was remarkable for WBC of 13 K.  Patient is is positive for COVID 19, negative for flu.     Patient was admitted to the medical floor for further workup.  He was started on IV antibiotics and Decadron.     Subjective & 24hr Events:   Still requiring 2 L nasal cannula.  Receiving dexamethasone nebulized bronchodilator therapy.  Adding 
       Hospitalist Progress Note   Admit Date:  2024  2:04 PM   Name:  Clinton Beckford   Age:  68 y.o.  Sex:  male  :  1955   MRN:  [id number]   Room:  [facility]    Presenting/Chief Complaint: Cough, Trouble with expiration, and Possible covid positive     Reason(s) for Admission: COPD exacerbation (HCC) [J44.1]  Multiple sclerosis exacerbation (HCC) [G35]  COVID-19 virus infection [U07.1]  Pneumonia due to COVID-19 virus [U07.1, J12.82]     Hospital Course:     Copied from prior provider HPI/summary:  Clinton Beckford is a 68 y.o. male with medical history of severe peripheral arterial disease with bilateral lower extremity skin ulcers with poor healing (follows up with Dr. Carrillo Daniel), right lower extremity flexion contracture (patient is wheelchair-bound and has not walked in more than 4 years), COPD, multiple sclerosis, functional quadriplegia who presented with difficulty breathing and dry cough for past 2 to 3 days.     As per the patient and his wife, patient's roommate was tested positive for COVID today.  Patient reported that he started feeling lethargic and weak over the past 2 to 3 days with associated dry cough and shortness of breath.  Patient denies any chest pain, nausea, vomiting, fever chills or night sweats.  No abdominal pain, urinary symptoms, headache, lightheadedness.  Patient is nonambulatory at baseline and is wheelchair-bound.     VS on arrival: Tmax 98.3, RR 22, , /84, saturation of 93% on room air.  Chest x-ray with evidence of bibasilar infiltrates versus edema, no effusion noted (self read).  EKG with NSR.  Blood work was remarkable for WBC of 13 K.  Patient is is positive for COVID 19, negative for flu.     Patient was admitted to the medical floor for further workup.  He was started on IV antibiotics and Decadron.     Subjective & 24hr Events:   Still unable to wean off oxygen-cough improved some with antitussives.  Overall minimal clinical 
       Hospitalist Progress Note   Admit Date:  2024  2:04 PM   Name:  Clinton Beckford   Age:  68 y.o.  Sex:  male  :  1955   MRN:  [id number]   Room:  [facility]    Presenting/Chief Complaint: Cough, Trouble with expiration, and Possible covid positive     Reason(s) for Admission: COPD exacerbation (HCC) [J44.1]  Multiple sclerosis exacerbation (HCC) [G35]  COVID-19 virus infection [U07.1]  Pneumonia due to COVID-19 virus [U07.1, J12.82]     Hospital Course:   Clinton Beckford is a 68 y.o. male with medical history of severe peripheral arterial disease with bilateral lower extremity skin ulcers with poor healing (follows up with Dr. Carrillo Daniel), right lower extremity flexion contracture (patient is wheelchair-bound and has not walked in more than 4 years), COPD, multiple sclerosis, functional quadriplegia who presented with difficulty breathing and dry cough for past 2 to 3 days.    As per the patient and his wife, patient's roommate was tested positive for COVID today.  Patient reported that he started feeling lethargic and weak over the past 2 to 3 days with associated dry cough and shortness of breath.  Patient denies any chest pain, nausea, vomiting, fever chills or night sweats.  No abdominal pain, urinary symptoms, headache, lightheadedness.  Patient is nonambulatory at baseline and is wheelchair-bound.    VS on arrival: Tmax 98.3, RR 22, , /84, saturation of 93% on room air.  Chest x-ray with evidence of bibasilar infiltrates versus edema, no effusion noted (self read).  EKG with NSR.  Blood work was remarkable for WBC of 13 K.  Patient is is positive for COVID 19, negative for flu.    Patient was admitted to the medical floor for further workup.  He was started on IV antibiotics and Decadron.    Subjective & 24hr Events:   Patient was seen and evaluated at bedside this morning.  Reports he feels better than he did yesterday.  Denies any fevers or chills.  Still having 
  Physician Progress Note      PATIENT:               MELECIO COCHRAN  [facility] #:                  [id number]  :                       1955  ADMIT DATE:       2024 2:04 PM  DISCH DATE:        2024 2:48 PM  RESPONDING  PROVIDER #:        Rene Inman MD          QUERY TEXT:    Pt admitted with pneumonia due to COVID-19 virus. Pt noted to have WBC 13.1,   tachycardia 97, 104, RR, 23, 22, CRP 11.2 on admission. If possible, please   document in the progress notes and discharge summary if you are evaluating and   /or treating any of the following:    The medical record reflects the following:  Risk Factors: 68yr, + COVID, Pneumonia due to COVID-19 virus  Clinical Indicators: WBC 13.1, tachycardia 97, 104, RR, 23, 22, CRP 11.2   Lactic acid 0.8 increased to 5.7 on admission  Treatment:  IVF bolus in ER, IV Rocephin, IV Vancomycin in ER, lab monitoring            [email or web address]  Options provided:  -- Sepsis, present on admission  -- Pneumonia due to COVID-19 virus without Sepsis  -- Other - I will add my own diagnosis  -- Disagree - Not applicable / Not valid  -- Disagree - Clinically unable to determine / Unknown  -- Refer to Clinical Documentation Reviewer    PROVIDER RESPONSE TEXT:    This patient has sepsis which was present on admission.    Query created by: PRADIP FREEDMAN on 2024 8:45 AM      Electronically signed by:  Rene Inman MD 2024 3:45 PM          
 completed follow up visit, as requested.  Patient engaged in life review and spiritual reflection.   provided pastoral presence, prayer and empathetic listening.  Peace be with you,  Signed by  JOVI SheehanDiv.   [phone]  
Advance Care Planning     Advance Care Planning Inpatient Note  [facility] Department    Today's Date: 2/8/2024  Unit: SFD 8 MED SURG    Received request from IDT Member.  Upon review of chart and communication with care team, patient's decision making abilities are not in question.. Patient was/were present in the room during visit.    Goals of ACP Conversation:  Discuss advance care planning documents    Health Care Decision Makers:     No healthcare decision makers have been documented.  Click here to complete HealthCare Decision Makers including selection of the Healthcare Decision Maker Relationship (ie \"Primary\")  Summary:  No Decision Maker named by patient at this time    Advance Care Planning Documents (Patient Wishes):  None     Assessment:  Patient expressed desire to complete HCPOA to name his roommate as primary agent and sister as secondary.  Patient was able to complete paperwork but no one was available to witness as patient is on COVID isolation.   plans to follow up tomorrow to assist in witnessing and filing documentation.    Interventions:  Provided education on documents for clarity and greater understanding  Discussed and provided education on state decision maker hierarchy  Encouraged ongoing ACP conversation with future decision makers and loved ones  Reviewed but did not complete ACP document    Care Preferences Communicated:   No    Outcomes/Plan:  ACP Discussion: Postponed    Electronically signed by Chaplain Evy on 2/8/2024 at 7:15 PM  
Advance Care Planning     Advance Care Planning Inpatient Note  [facility] Department    Today's Date: 2/9/2024  Unit: SFD 8 MED SURG    Received request from patient.  Upon review of chart and communication with care team, patient's decision making abilities are not in question.. Patient was/were present in the room during visit.    Goals of ACP Conversation:  Discuss advance care planning documents    Health Care Decision Makers:       Primary Decision Maker: Graciela Benavides - Brother/Sister - [phone]    Secondary Decision Maker: Albertina Pineda - Lay Caregiver - [phone]  Summary:  No Decision Maker named by patient at this time    Advance Care Planning Documents (Patient Wishes):  Healthcare Power of /Advance Directive Appointment of Health Care Agent     Assessment:  Left forms    Interventions:  Encouraged ongoing ACP conversation with future decision makers and loved ones    Care Preferences Communicated:   No    Outcomes/Plan:  ACP Discussion: Postponed    Electronically signed by Chaplain Lori on 2/9/2024 at 2:14 PM          
Assumed care of patient. Assessment completed and documented, see docflow. Patient awake in bed. A/O. NAD noted at present. Respirations even and non labored on 2LNC. Encouraged to call for needs. Call light within reach. Will continue to monitor.     
Assumed care of patient. Assessment completed and documented, see docflow. Patient resting quietly in bed. NAD noted at present. Respirations even and non labored on 2.5LNC. Safety measures in place. Call light within reach. Will continue to monitor.     
Discharge instructions explained to patient.  Understanding verbalized of all discharge instructions, both written and verbal. Adequate time given for questions. All questions asked/answered. Discharge medications reviewed as appropriate and Rx handed to patient.    PIV removed and bandage applied.     
Lab notified this RN that Lactic acid resulted at 5.7. Elizabeth Otero NP notified via perfect serve.     Primary RN, Cari aware of the above.   
Patient resting in bed, eyes closed. Droplet plus precautions. RA, no resp distress. 0/10 pain. Bedside table and call light within reach. Will continue to monitor.  
Patient transferred from ED to room 805 via stretcher. Alert and oriented x4. Respirations even and unlabored on 2L NC. NAD noted. Placed on droplet plus precautions.   
Patient with c/o cough, tessalon given PO; see MAR. Will continue to monitor.   
Pt resting comfortably in bed. No distress noted at this time. Pt on 2 LNC, denies pain or distress. Pt encouraged to call for assistance if needed call light in reach, will monitor.   
Pt resting in bed, no distress noted at this time. Pt remains on 2.5 L NC. Call light in reach, will monitor.   
Reassessment unchanged. Patient resting quietly in bed. NAD noted. Respirations present. Will continue to monitor.  
Tessalon given PO for c/o cough. Will continue to monitor.  
until stated goals are met, whichever comes first.    THERAPY PROGNOSIS: Excellent    PROBLEM LIST:   (Skilled intervention is medically necessary to address:)  Decreased ADL/Functional Activities  Decreased Activity Tolerance  Decreased AROM/PROM  Decreased Balance  Decreased Coordination  Decreased Gait Ability  Decreased Strength  Decreased Transfer Abilities  Increased Pain INTERVENTIONS PLANNED:   (Benefits and precautions of physical therapy have been discussed with the patient.)  Self Care Training  Therapeutic Activity  Therapeutic Exercise/HEP  Neuromuscular Re-education  Gait Training  Manual Therapy  Modalities  Education       TREATMENT:   EVALUATION: LOW COMPLEXITY: (Untimed Charge)    TREATMENT:   Co-Treatment PT/OT necessary due to patient's decreased overall endurance/tolerance levels, as well as need for high level skilled assistance to complete functional transfers/mobility and functional tasks  Therapeutic Activity (11 Minutes): Therapeutic activity included Rolling, Supine to Sit, Scooting, Transfer Training, Ambulation on level ground, Sitting balance , and Standing balance to improve functional Activity tolerance, Balance, Coordination, Mobility, Strength, and ROM.    TREATMENT GRID:  N/A    AFTER TREATMENT PRECAUTIONS: Call light within reach, Chair, Needs within reach, and RN notified    INTERDISCIPLINARY COLLABORATION:  RN/ PCT, PT/ PTA, and OT/ LIMON    EDUCATION: Education Given To: Patient  Education Provided: Plan of Care    TIME IN/OUT:  Time In: 0921  Time Out: 0942  Minutes: 21    Brionna Genao PT

## 2024-10-10 NOTE — DISCHARGE INSTRUCTIONS
Bilateral Feet  Cleanse wound and periwound with wound cleanser or normal saline. Vashe moistened gauze to wound bed. Xeroform- apply to wound bed. Cover with ABD  Wrap with Kerlix. Dressing change 3x weekly. [facility], [address] for wound assesments and wound care between patient appointments at wound center. Keflex and Bactrim - obtain from preferred pharmacy     Consult with Dr. Darrion Sylvester. Pt A/Ox4, with intermittent confusion. BP low 80's, urine output low this evening. Dr informed, no new orders. Abd Stab site x4 JOAN with surgical glue, CDI. Castañeda continuing to drain without difficulty. No ectopy noted, SR on telemetry. Bed alarm on. Will con to monitor.

## 2024-10-28 ENCOUNTER — HOSPITAL ENCOUNTER (OUTPATIENT)
Dept: CT IMAGING | Age: 69
Discharge: HOME | End: 2024-10-31
Payer: MEDICARE

## 2024-10-28 DIAGNOSIS — I73.9 PERIPHERAL VASCULAR DISEASE, UNSPECIFIED (HCC): ICD-10-CM

## 2024-10-28 LAB — CREAT BLD-MCNC: 0.72 MG/DL (ref 0.8–1.5)

## 2024-10-28 PROCEDURE — 82565 ASSAY OF CREATININE: CPT

## 2024-10-28 PROCEDURE — 6360000004 HC RX CONTRAST MEDICATION: Performed by: FAMILY MEDICINE

## 2024-10-28 PROCEDURE — 75635 CT ANGIO ABDOMINAL ARTERIES: CPT | Performed by: RADIOLOGY

## 2024-10-28 PROCEDURE — 75635 CT ANGIO ABDOMINAL ARTERIES: CPT

## 2024-10-28 RX ORDER — IOPAMIDOL 755 MG/ML
100 INJECTION, SOLUTION INTRAVASCULAR
Status: COMPLETED | OUTPATIENT
Start: 2024-10-28 | End: 2024-10-28

## 2024-10-28 RX ADMIN — IOPAMIDOL 100 ML: 755 INJECTION, SOLUTION INTRAVENOUS at 13:15

## 2025-03-24 ENCOUNTER — HOSPITAL ENCOUNTER (INPATIENT)
Age: 70
LOS: 2 days | Discharge: HOME HEALTH | DRG: 190 | End: 2025-03-27
Attending: STUDENT IN AN ORGANIZED HEALTH CARE EDUCATION/TRAINING PROGRAM | Admitting: FAMILY MEDICINE
Payer: MEDICARE

## 2025-03-24 DIAGNOSIS — R09.02 HYPOXEMIA: ICD-10-CM

## 2025-03-24 DIAGNOSIS — J44.1 COPD EXACERBATION (HCC): Primary | ICD-10-CM

## 2025-03-24 PROCEDURE — 99285 EMERGENCY DEPT VISIT HI MDM: CPT

## 2025-03-24 PROCEDURE — 85025 COMPLETE CBC W/AUTO DIFF WBC: CPT

## 2025-03-24 PROCEDURE — 80053 COMPREHEN METABOLIC PANEL: CPT

## 2025-03-24 PROCEDURE — 83880 ASSAY OF NATRIURETIC PEPTIDE: CPT

## 2025-03-24 PROCEDURE — 84484 ASSAY OF TROPONIN QUANT: CPT

## 2025-03-24 ASSESSMENT — PAIN - FUNCTIONAL ASSESSMENT: PAIN_FUNCTIONAL_ASSESSMENT: NONE - DENIES PAIN

## 2025-03-25 ENCOUNTER — APPOINTMENT (OUTPATIENT)
Dept: GENERAL RADIOLOGY | Age: 70
DRG: 190 | End: 2025-03-25
Payer: MEDICARE

## 2025-03-25 PROBLEM — J44.1 COPD EXACERBATION (HCC): Status: ACTIVE | Noted: 2025-03-25

## 2025-03-25 LAB
ALBUMIN SERPL-MCNC: 3.9 G/DL (ref 3.2–4.6)
ALBUMIN/GLOB SERPL: 1.2 (ref 1–1.9)
ALP SERPL-CCNC: 107 U/L (ref 40–129)
ALT SERPL-CCNC: 24 U/L (ref 8–55)
ANION GAP SERPL CALC-SCNC: 13 MMOL/L (ref 7–16)
ARTERIAL PATENCY WRIST A: POSITIVE
AST SERPL-CCNC: 20 U/L (ref 15–37)
BASE DEFICIT BLDV-SCNC: 3.1 MMOL/L
BASOPHILS # BLD: 0.03 K/UL (ref 0–0.2)
BASOPHILS NFR BLD: 0.2 % (ref 0–2)
BDY SITE: ABNORMAL
BILIRUB SERPL-MCNC: 0.9 MG/DL (ref 0–1.2)
BUN SERPL-MCNC: 20 MG/DL (ref 8–23)
CALCIUM SERPL-MCNC: 9.3 MG/DL (ref 8.8–10.2)
CHLORIDE SERPL-SCNC: 107 MMOL/L (ref 98–107)
CO2 SERPL-SCNC: 23 MMOL/L (ref 20–29)
CREAT SERPL-MCNC: 0.75 MG/DL (ref 0.8–1.3)
DIFFERENTIAL METHOD BLD: ABNORMAL
EKG ATRIAL RATE: 82 BPM
EKG DIAGNOSIS: NORMAL
EKG P AXIS: 83 DEGREES
EKG P-R INTERVAL: 215 MS
EKG Q-T INTERVAL: 375 MS
EKG QRS DURATION: 102 MS
EKG QTC CALCULATION (BAZETT): 430 MS
EKG R AXIS: 100 DEGREES
EKG T AXIS: 86 DEGREES
EKG VENTRICULAR RATE: 79 BPM
EOSINOPHIL # BLD: 0.56 K/UL (ref 0–0.8)
EOSINOPHIL NFR BLD: 4.5 % (ref 0.5–7.8)
ERYTHROCYTE [DISTWIDTH] IN BLOOD BY AUTOMATED COUNT: 13.1 % (ref 11.9–14.6)
GAS FLOW.O2 O2 DELIVERY SYS: ABNORMAL
GLOBULIN SER CALC-MCNC: 3.3 G/DL (ref 2.3–3.5)
GLUCOSE SERPL-MCNC: 120 MG/DL (ref 70–99)
HCO3 BLDV-SCNC: 21.3 MMOL/L (ref 22–29)
HCT VFR BLD AUTO: 45.7 % (ref 41.1–50.3)
HGB BLD-MCNC: 15.2 G/DL (ref 13.6–17.2)
IMM GRANULOCYTES # BLD AUTO: 0.05 K/UL (ref 0–0.5)
IMM GRANULOCYTES NFR BLD AUTO: 0.4 % (ref 0–5)
LYMPHOCYTES # BLD: 3.31 K/UL (ref 0.5–4.6)
LYMPHOCYTES NFR BLD: 26.6 % (ref 13–44)
MCH RBC QN AUTO: 29.5 PG (ref 26.1–32.9)
MCHC RBC AUTO-ENTMCNC: 33.3 G/DL (ref 31.4–35)
MCV RBC AUTO: 88.7 FL (ref 82–102)
MONOCYTES # BLD: 0.83 K/UL (ref 0.1–1.3)
MONOCYTES NFR BLD: 6.7 % (ref 4–12)
NEUTS SEG # BLD: 7.68 K/UL (ref 1.7–8.2)
NEUTS SEG NFR BLD: 61.6 % (ref 43–78)
NRBC # BLD: 0 K/UL (ref 0–0.2)
NT PRO BNP: 167 PG/ML (ref 0–125)
PCO2 BLDV: 35.6 MMHG (ref 41–51)
PH BLDV: 7.39 (ref 7.32–7.42)
PLATELET # BLD AUTO: 301 K/UL (ref 150–450)
PMV BLD AUTO: 11.1 FL (ref 9.4–12.3)
PO2 BLDV: 66 MMHG
POC FIO2: 2
POTASSIUM SERPL-SCNC: 4.8 MMOL/L (ref 3.5–5.1)
PROT SERPL-MCNC: 7.2 G/DL (ref 6.3–8.2)
RBC # BLD AUTO: 5.15 M/UL (ref 4.23–5.6)
SAO2 % BLDV: 92.7 % (ref 65–88)
SERVICE CMNT-IMP: ABNORMAL
SODIUM SERPL-SCNC: 143 MMOL/L (ref 136–145)
SPECIMEN TYPE: ABNORMAL
TROPONIN T SERPL HS-MCNC: 18 NG/L (ref 0–22)
TROPONIN T SERPL HS-MCNC: 37 NG/L (ref 0–22)
WBC # BLD AUTO: 12.5 K/UL (ref 4.3–11.1)

## 2025-03-25 PROCEDURE — 2500000003 HC RX 250 WO HCPCS: Performed by: FAMILY MEDICINE

## 2025-03-25 PROCEDURE — 94669 MECHANICAL CHEST WALL OSCILL: CPT

## 2025-03-25 PROCEDURE — 6370000000 HC RX 637 (ALT 250 FOR IP): Performed by: HOSPITALIST

## 2025-03-25 PROCEDURE — 1100000000 HC RM PRIVATE

## 2025-03-25 PROCEDURE — 93005 ELECTROCARDIOGRAM TRACING: CPT | Performed by: STUDENT IN AN ORGANIZED HEALTH CARE EDUCATION/TRAINING PROGRAM

## 2025-03-25 PROCEDURE — 96365 THER/PROPH/DIAG IV INF INIT: CPT

## 2025-03-25 PROCEDURE — 71045 X-RAY EXAM CHEST 1 VIEW: CPT

## 2025-03-25 PROCEDURE — 2700000000 HC OXYGEN THERAPY PER DAY

## 2025-03-25 PROCEDURE — 6370000000 HC RX 637 (ALT 250 FOR IP): Performed by: FAMILY MEDICINE

## 2025-03-25 PROCEDURE — 6360000002 HC RX W HCPCS: Performed by: STUDENT IN AN ORGANIZED HEALTH CARE EDUCATION/TRAINING PROGRAM

## 2025-03-25 PROCEDURE — 93010 ELECTROCARDIOGRAM REPORT: CPT | Performed by: INTERNAL MEDICINE

## 2025-03-25 PROCEDURE — 94640 AIRWAY INHALATION TREATMENT: CPT

## 2025-03-25 PROCEDURE — 6360000002 HC RX W HCPCS: Performed by: HOSPITALIST

## 2025-03-25 PROCEDURE — 6360000002 HC RX W HCPCS: Performed by: FAMILY MEDICINE

## 2025-03-25 PROCEDURE — 82803 BLOOD GASES ANY COMBINATION: CPT

## 2025-03-25 RX ORDER — ACETAMINOPHEN 650 MG/1
650 SUPPOSITORY RECTAL EVERY 6 HOURS PRN
Status: DISCONTINUED | OUTPATIENT
Start: 2025-03-25 | End: 2025-03-27 | Stop reason: HOSPADM

## 2025-03-25 RX ORDER — ONDANSETRON 4 MG/1
4 TABLET, ORALLY DISINTEGRATING ORAL EVERY 8 HOURS PRN
Status: DISCONTINUED | OUTPATIENT
Start: 2025-03-25 | End: 2025-03-27 | Stop reason: HOSPADM

## 2025-03-25 RX ORDER — ASPIRIN 325 MG
325 TABLET ORAL DAILY
Status: DISCONTINUED | OUTPATIENT
Start: 2025-03-25 | End: 2025-03-27 | Stop reason: HOSPADM

## 2025-03-25 RX ORDER — ENOXAPARIN SODIUM 100 MG/ML
40 INJECTION SUBCUTANEOUS DAILY
Status: DISCONTINUED | OUTPATIENT
Start: 2025-03-25 | End: 2025-03-27 | Stop reason: HOSPADM

## 2025-03-25 RX ORDER — ARFORMOTEROL TARTRATE 15 UG/2ML
15 SOLUTION RESPIRATORY (INHALATION)
Status: DISCONTINUED | OUTPATIENT
Start: 2025-03-25 | End: 2025-03-27 | Stop reason: HOSPADM

## 2025-03-25 RX ORDER — ACETAMINOPHEN 325 MG/1
650 TABLET ORAL EVERY 6 HOURS PRN
Status: DISCONTINUED | OUTPATIENT
Start: 2025-03-25 | End: 2025-03-27 | Stop reason: HOSPADM

## 2025-03-25 RX ORDER — ALBUTEROL SULFATE 2.5 MG/.5ML
10 SOLUTION RESPIRATORY (INHALATION)
Status: DISCONTINUED | OUTPATIENT
Start: 2025-03-25 | End: 2025-03-25 | Stop reason: SDUPTHER

## 2025-03-25 RX ORDER — SODIUM CHLORIDE 0.9 % (FLUSH) 0.9 %
5-40 SYRINGE (ML) INJECTION PRN
Status: DISCONTINUED | OUTPATIENT
Start: 2025-03-25 | End: 2025-03-27 | Stop reason: HOSPADM

## 2025-03-25 RX ORDER — SODIUM CHLORIDE 0.9 % (FLUSH) 0.9 %
5-40 SYRINGE (ML) INJECTION EVERY 12 HOURS SCHEDULED
Status: DISCONTINUED | OUTPATIENT
Start: 2025-03-25 | End: 2025-03-27 | Stop reason: HOSPADM

## 2025-03-25 RX ORDER — SPIRONOLACTONE 25 MG/1
25 TABLET ORAL DAILY
COMMUNITY

## 2025-03-25 RX ORDER — POLYETHYLENE GLYCOL 3350 17 G/17G
17 POWDER, FOR SOLUTION ORAL DAILY PRN
Status: DISCONTINUED | OUTPATIENT
Start: 2025-03-25 | End: 2025-03-27 | Stop reason: HOSPADM

## 2025-03-25 RX ORDER — ALBUTEROL SULFATE 0.83 MG/ML
2.5 SOLUTION RESPIRATORY (INHALATION) EVERY 4 HOURS PRN
Status: DISCONTINUED | OUTPATIENT
Start: 2025-03-25 | End: 2025-03-27 | Stop reason: HOSPADM

## 2025-03-25 RX ORDER — ALBUTEROL SULFATE 5 MG/ML
10 SOLUTION RESPIRATORY (INHALATION) CONTINUOUS
Status: DISPENSED | OUTPATIENT
Start: 2025-03-25 | End: 2025-03-25

## 2025-03-25 RX ORDER — CARVEDILOL 3.12 MG/1
3.12 TABLET ORAL 2 TIMES DAILY WITH MEALS
COMMUNITY

## 2025-03-25 RX ORDER — IPRATROPIUM BROMIDE AND ALBUTEROL SULFATE 2.5; .5 MG/3ML; MG/3ML
1 SOLUTION RESPIRATORY (INHALATION)
Status: DISCONTINUED | OUTPATIENT
Start: 2025-03-25 | End: 2025-03-27 | Stop reason: HOSPADM

## 2025-03-25 RX ORDER — MAGNESIUM SULFATE IN WATER 40 MG/ML
2000 INJECTION, SOLUTION INTRAVENOUS PRN
Status: DISCONTINUED | OUTPATIENT
Start: 2025-03-25 | End: 2025-03-27 | Stop reason: HOSPADM

## 2025-03-25 RX ORDER — POTASSIUM CHLORIDE 1500 MG/1
40 TABLET, EXTENDED RELEASE ORAL PRN
Status: DISCONTINUED | OUTPATIENT
Start: 2025-03-25 | End: 2025-03-27 | Stop reason: HOSPADM

## 2025-03-25 RX ORDER — DAPAGLIFLOZIN 5 MG/1
5 TABLET, FILM COATED ORAL EVERY MORNING
COMMUNITY

## 2025-03-25 RX ORDER — POTASSIUM CHLORIDE 7.45 MG/ML
10 INJECTION INTRAVENOUS PRN
Status: DISCONTINUED | OUTPATIENT
Start: 2025-03-25 | End: 2025-03-27 | Stop reason: HOSPADM

## 2025-03-25 RX ORDER — ONDANSETRON 2 MG/ML
4 INJECTION INTRAMUSCULAR; INTRAVENOUS EVERY 6 HOURS PRN
Status: DISCONTINUED | OUTPATIENT
Start: 2025-03-25 | End: 2025-03-27 | Stop reason: HOSPADM

## 2025-03-25 RX ORDER — MAGNESIUM SULFATE IN WATER 40 MG/ML
2000 INJECTION, SOLUTION INTRAVENOUS ONCE
Status: COMPLETED | OUTPATIENT
Start: 2025-03-25 | End: 2025-03-25

## 2025-03-25 RX ORDER — BUDESONIDE 0.5 MG/2ML
0.5 INHALANT ORAL
Status: DISCONTINUED | OUTPATIENT
Start: 2025-03-25 | End: 2025-03-27 | Stop reason: HOSPADM

## 2025-03-25 RX ORDER — ERGOCALCIFEROL 1.25 MG/1
50000 CAPSULE, LIQUID FILLED ORAL WEEKLY
COMMUNITY

## 2025-03-25 RX ORDER — GUAIFENESIN 600 MG/1
1200 TABLET, EXTENDED RELEASE ORAL 2 TIMES DAILY
Status: DISCONTINUED | OUTPATIENT
Start: 2025-03-25 | End: 2025-03-27 | Stop reason: HOSPADM

## 2025-03-25 RX ORDER — SODIUM CHLORIDE 9 MG/ML
INJECTION, SOLUTION INTRAVENOUS PRN
Status: DISCONTINUED | OUTPATIENT
Start: 2025-03-25 | End: 2025-03-27 | Stop reason: HOSPADM

## 2025-03-25 RX ORDER — ATORVASTATIN CALCIUM 20 MG/1
20 TABLET, FILM COATED ORAL DAILY
COMMUNITY

## 2025-03-25 RX ADMIN — ENOXAPARIN SODIUM 40 MG: 100 INJECTION SUBCUTANEOUS at 13:46

## 2025-03-25 RX ADMIN — GUAIFENESIN 1200 MG: 600 TABLET ORAL at 11:57

## 2025-03-25 RX ADMIN — BUDESONIDE INHALATION 500 MCG: 0.5 SUSPENSION RESPIRATORY (INHALATION) at 19:06

## 2025-03-25 RX ADMIN — SODIUM CHLORIDE, PRESERVATIVE FREE 10 ML: 5 INJECTION INTRAVENOUS at 11:56

## 2025-03-25 RX ADMIN — SODIUM CHLORIDE, PRESERVATIVE FREE 10 ML: 5 INJECTION INTRAVENOUS at 20:45

## 2025-03-25 RX ADMIN — BUDESONIDE INHALATION 500 MCG: 0.5 SUSPENSION RESPIRATORY (INHALATION) at 11:35

## 2025-03-25 RX ADMIN — ARFORMOTEROL TARTRATE 15 MCG: 15 SOLUTION RESPIRATORY (INHALATION) at 19:06

## 2025-03-25 RX ADMIN — GUAIFENESIN 1200 MG: 600 TABLET ORAL at 20:45

## 2025-03-25 RX ADMIN — IPRATROPIUM BROMIDE AND ALBUTEROL SULFATE 1 DOSE: 2.5; .5 SOLUTION RESPIRATORY (INHALATION) at 08:11

## 2025-03-25 RX ADMIN — IPRATROPIUM BROMIDE AND ALBUTEROL SULFATE 1 DOSE: 2.5; .5 SOLUTION RESPIRATORY (INHALATION) at 19:06

## 2025-03-25 RX ADMIN — ALBUTEROL SULFATE 10 MG/HR: 2.5 SOLUTION RESPIRATORY (INHALATION) at 02:12

## 2025-03-25 RX ADMIN — MAGNESIUM SULFATE HEPTAHYDRATE 2000 MG: 40 INJECTION, SOLUTION INTRAVENOUS at 02:10

## 2025-03-25 RX ADMIN — ASPIRIN 325 MG: 325 TABLET, FILM COATED ORAL at 11:53

## 2025-03-25 RX ADMIN — IPRATROPIUM BROMIDE AND ALBUTEROL SULFATE 1 DOSE: 2.5; .5 SOLUTION RESPIRATORY (INHALATION) at 11:36

## 2025-03-25 RX ADMIN — ARFORMOTEROL TARTRATE 15 MCG: 15 SOLUTION RESPIRATORY (INHALATION) at 11:35

## 2025-03-25 RX ADMIN — METHYLPREDNISOLONE SODIUM SUCCINATE 40 MG: 40 INJECTION, POWDER, LYOPHILIZED, FOR SOLUTION INTRAMUSCULAR; INTRAVENOUS at 11:53

## 2025-03-25 ASSESSMENT — LIFESTYLE VARIABLES
HOW OFTEN DO YOU HAVE A DRINK CONTAINING ALCOHOL: NEVER
HOW MANY STANDARD DRINKS CONTAINING ALCOHOL DO YOU HAVE ON A TYPICAL DAY: PATIENT DOES NOT DRINK

## 2025-03-25 NOTE — ED TRIAGE NOTES
Arrives via [facility] from home. CC SOB, productive cough with clear sputum. Fire department claims 92% RA- albuterol taken multiple times today.   HX of COPD- worse today   HR 75  NSR  99% with interventions  117/61  Bgl 133  97.4  20 G LFA    3.5 mg combivent  125 mg Solu-Medrol IV  0.25 mg terbutaline SQ

## 2025-03-25 NOTE — PROGRESS NOTES
4 Eyes Skin Assessment     NAME:  Clinton Beckford  YOB: 1955  MEDICAL RECORD NUMBER:  [id number]    The patient is being assessed for  {Reason for Assessment:56657}    I agree that at least one RN has performed a thorough Head to Toe Skin Assessment on the patient. ALL assessment sites listed below have been assessed.      Areas assessed by both nurses:    Head, Face, Ears, Shoulders, Back, Chest, Arms, Elbows, Hands, Sacrum. Buttock, Coccyx, Ischium, Legs. Feet and Heels, and Under Medical Devices         Does the Patient have a Wound? No noted wound(s)       Shahbaz Prevention initiated by RN: Yes  Wound Care Orders initiated by RN: No    Pressure Injury (Stage 3,4, Unstageable, DTI, NWPT, and Complex wounds) if present, place Wound referral order by RN under : No    New Ostomies, if present place, Ostomy referral order under : No     Nurse 1 eSignature: Electronically signed by DEBBIE KING RN on 3/25/25 at 5:11 PM EDT    **SHARE this note so that the co-signing nurse can place an eSignature**    Nurse 2 eSignature: {Esignature:[id number]}

## 2025-03-25 NOTE — NURSE NAVIGATOR
This RN Navigator at bedside, introduced to patient.  Patient informed that this RN Navigator will be following them at least 30 days post discharge to act as a resource for any needs that may arise in relation to their COPD diagnosis and treatment.       Patient states understanding of COPD disease process.  COPD action plan discussed.  Patient states understanding. Discussed triggers, precautions, managing stress and breathing techniques with patient.       Patient verbalized understanding of importance of COPD  zone tool, handwashing, PCP follow up within 7 days of hospital discharge.    Patient states he does not use oxygen at home but he does have inhalers and a nebulizer he uses. He states he lives with a room mate and the room mate's wife along with 3 cats. He has his 's license but does not drive. His medications are delivered from [facility] and he uses the [employer] van to get to his appointments. He states he does not have a pulmonologist.     COPD educational booklet given to patient along with this RN Navigator's card.  Patient informed that this RN Navigator will be contacting them within 48-72 hours of discharge. Contact information verified by patient.       This RN Navigator will continue to follow.

## 2025-03-25 NOTE — H&P
Hospitalist History and Physical   Admit Date:  3/24/2025 11:41 PM   Name:  Clinton Beckford   Age:  69 y.o.  Sex:  male  :  1955   MRN:  [id number]     Presenting Complaint: shortness of breath  Reason(s) for Admission: COPD exacerbation (HCC) [J44.1]     History of Present Illness:   Clinton Beckford is a 69 y.o. male with medical history of COPD, multiple sclerosis, functional quadriplegia who presented to ED with shortness of breath.  Symptoms ongoing for the past few days.  Upon ER evaluation, patient hypoxic down to 87% on RA.  Currently requiring 2L/m supplemental O2.  WBC is 12.5.  Initial trop was 37, repeat is 18.  VBG with normal pH at 7.39, pCO2 35.6.  CXR shows:  IMPRESSION:  No acute findings in the chest  Hospitalist asked to admit.    Review of Systems:  10 systems reviewed and negative except as noted in HPI.  Assessment & Plan:   * COPD exacerbation (HCC)  Assessment & Plan  - Scheduled duonebs, PRN albuterol, IV solumedrol  - Wean O2 as tolerated    Multiple sclerosis (HCC)  Assessment & Plan  - Of note    Severe protein-calorie malnutrition  Assessment & Plan  - Of note    Functional quadriplegia (HCC)  Assessment & Plan  - Of note        Disposition: inpatient      Past medical history reviewed.    Past Medical History:   Diagnosis Date    Cellulitis     Chronic obstructive pulmonary disease (HCC)     Generalized weakness     Methamphetamine abuse (HCC)     Multiple sclerosis (HCC)     wheelchair bound; can't extend lower extremties    PVD (peripheral vascular disease)     has stent in groin    Sacral ulcer (HCC)     Skin ulcers of foot, bilateral (HCC)     chronic     Past surgical history reviewed.    Past Surgical History:   Procedure Laterality Date    VASCULAR SURGERY  2022    Ultrasound-guided access of the left common femoral artery with placement of a catheter in the aorta for aortogram.Bilateral lower extremity arteriograms.      No Known Allergies

## 2025-03-25 NOTE — ED PROVIDER NOTES
Emergency Department Provider Note       PCP: Unknown, Provider, ANP   Age: 69 y.o.   Sex: male     DISPOSITION Decision To Admit 03/25/2025 02:21:53 AM    ICD-10-CM    1. COPD exacerbation (HCC)  J44.1       2. Hypoxemia  R09.02           Medical Decision Making     69-year-old male history of COPD presents Emergency department on nonrebreather after breathing treatment.  He reports shortness of breath for many months, worse the past few days.  Reports thick sputum production.  Denies fevers or chills.  Denies chest pain or back pain.  Denies pain or swelling in lower extremities.  Is not on oxygen routinely.  He had the ER patient O2 saturation dropped to 87% on room air.  Requiring supplemental oxygen.  Patient was given Solu-Medrol as well as breathing treatments with EMS.  Here is VBG is unremarkable.  Blood work without any significant findings.  Initial troponin was 37, repeat is 18.  Chest x-ray is normal.  Patient given hour-long breathing treatment as well as magnesium over 30 minutes.  Still requiring some oxygen, will require admission.  Hospitalist consulted.  Patient voiced understanding and agreement.     1 or more acute illnesses that pose a threat to life or bodily function.   Shared medical decision making was utilized in creating the patients health plan today.  I independently ordered and reviewed each unique test.    I reviewed external records: provider visit note from outside specialist.     ED cardiac monitoring rhythm strip was ordered and interpreted:  sinus rhythm, no evidence of an arrhythmia  ST Segments:Nonspecific ST segments - NO STEMI   Rate: 74  I interpreted the X-rays no pneumothorax.  ED provider's independent EKG interpretation sinus rhythm, rate 79, no significant ST elevation or depression.  The patient was admitted and I have discussed patient management with the admitting provider.    Critical care procedure note : 42 minutes of critical care time was performed in the

## 2025-03-25 NOTE — PROGRESS NOTES
TRANSFER - IN REPORT:    Verbal report received from LICO Scales on Clinton Beckford  being received from ED for routine progression of patient care      Report consisted of patient's Situation, Background, Assessment and   Recommendations(SBAR).     Information from the following report(s) ED Encounter Summary and ED SBAR was reviewed with the receiving nurse.    Opportunity for questions and clarification was provided.      Report given to LICO Gonzales who will assume care upon arrival to unit.

## 2025-03-25 NOTE — ED NOTES
TRANSFER - OUT REPORT:    Verbal report given to Lucy BARFIELD on Clinton Beckford  being transferred to 2 for routine progression of patient care       Report consisted of patient's Situation, Background, Assessment and   Recommendations(SBAR).     Information from the following report(s) Nurse Handoff Report was reviewed with the receiving nurse.    [place] Fall Assessment:    Presents to emergency department  because of falls (Syncope, seizure, or loss of consciousness): No  Age > 70: No  Altered Mental Status, Intoxication with alcohol or substance confusion (Disorientation, impaired judgment, poor safety awaremess, or inability to follow instructions): No  Impaired Mobility: Ambulates or transfers with assistive devices or assistance; Unable to ambulate or transer.: No             Lines:   Peripheral IV 03/25/25 Left Forearm (Active)        Opportunity for questions and clarification was provided.      Patient transported with:  Lizzette Oviedo RN  03/25/25 4937

## 2025-03-25 NOTE — CARE COORDINATION
Patient rents a room from a couple who live in a 2 bedroom apartment.  He has been non-ambulatory for some time and has a motorized wheelchair and transfer bench.  Patient also has a hospital bed, shower bench and bedside commode.  He uses the mobility van ot get back and forth to MD appts.  Next PCP appt is 5/1. He reports consistent contact with outpatient  Zuleika Lincoln.    CM to follow clinical course for discharge planning needs which may include HH vs. STR.    03/25/25 5422   Service Assessment   Patient Orientation Alert and Oriented   Cognition Alert   History Provided By Patient   Primary Caregiver Self   Support Systems Family Members;/;Friends/Neighbors   Patient's Healthcare Decision Maker is: Legal Next of Kin   PCP Verified by CM Yes   Last Visit to PCP Within last 3 months   Prior Functional Level Assistance with the following:;Cooking;Housework;Shopping;Mobility   Current Functional Level Mobility;Shopping;Housework;Cooking   Can patient return to prior living arrangement Yes   Ability to make needs known: Good   Family able to assist with home care needs: Yes   Would you like for me to discuss the discharge plan with any other family members/significant others, and if so, who? Yes   Financial Resources Medicaid;Medicare   Community Resources None   CM/SW Referral Other (see comment)  (pending)   Social/Functional History   Lives With Friend(s)   Type of Home Apartment   Home Layout One level   Bathroom Shower/Tub Walk-in shower   Bathroom Toilet Standard   Bathroom Equipment Grab bars in shower;Grab bars around toilet;Tub transfer bench   Bathroom Accessibility Accessible   Home Equipment Wheelchair - Electric;Hospital bed;Sliding Board   Receives Help From Friend(s)   Prior Level of Assist for ADLs Independent   Prior Level of Assist for Homemaking Needs assistance   Homemaking Responsibilities Yes   Ambulation Assistance Needs assistance   Prior Level of Assist

## 2025-03-25 NOTE — ACP (ADVANCE CARE PLANNING)
Advance Care Planning   Healthcare Decision Maker:    Primary Decision Maker: Graciela Benavides - Brother/Sister - [phone]    Secondary Decision Maker: Albertina Pineda - Lay Caregiver - [phone]    Click here to complete Healthcare Decision Makers including selection of the Healthcare Decision Maker Relationship (ie \"Primary\").

## 2025-03-26 PROBLEM — Z51.5 ENCOUNTER FOR PALLIATIVE CARE: Status: ACTIVE | Noted: 2025-03-26

## 2025-03-26 LAB
ANION GAP SERPL CALC-SCNC: 11 MMOL/L (ref 7–16)
BASOPHILS # BLD: 0.03 K/UL (ref 0–0.2)
BASOPHILS NFR BLD: 0.2 % (ref 0–2)
BUN SERPL-MCNC: 23 MG/DL (ref 8–23)
CALCIUM SERPL-MCNC: 8.8 MG/DL (ref 8.8–10.2)
CHLORIDE SERPL-SCNC: 108 MMOL/L (ref 98–107)
CO2 SERPL-SCNC: 22 MMOL/L (ref 20–29)
CREAT SERPL-MCNC: 0.83 MG/DL (ref 0.8–1.3)
DIFFERENTIAL METHOD BLD: ABNORMAL
EOSINOPHIL # BLD: 0.08 K/UL (ref 0–0.8)
EOSINOPHIL NFR BLD: 0.6 % (ref 0.5–7.8)
ERYTHROCYTE [DISTWIDTH] IN BLOOD BY AUTOMATED COUNT: 13.2 % (ref 11.9–14.6)
GLUCOSE SERPL-MCNC: 127 MG/DL (ref 70–99)
HCT VFR BLD AUTO: 44.7 % (ref 41.1–50.3)
HGB BLD-MCNC: 14.5 G/DL (ref 13.6–17.2)
IMM GRANULOCYTES # BLD AUTO: 0.06 K/UL (ref 0–0.5)
IMM GRANULOCYTES NFR BLD AUTO: 0.5 % (ref 0–5)
LYMPHOCYTES # BLD: 1.78 K/UL (ref 0.5–4.6)
LYMPHOCYTES NFR BLD: 13.6 % (ref 13–44)
MCH RBC QN AUTO: 29.7 PG (ref 26.1–32.9)
MCHC RBC AUTO-ENTMCNC: 32.4 G/DL (ref 31.4–35)
MCV RBC AUTO: 91.6 FL (ref 82–102)
MONOCYTES # BLD: 0.95 K/UL (ref 0.1–1.3)
MONOCYTES NFR BLD: 7.3 % (ref 4–12)
NEUTS SEG # BLD: 10.19 K/UL (ref 1.7–8.2)
NEUTS SEG NFR BLD: 77.8 % (ref 43–78)
NRBC # BLD: 0 K/UL (ref 0–0.2)
PLATELET # BLD AUTO: 287 K/UL (ref 150–450)
PMV BLD AUTO: 11.3 FL (ref 9.4–12.3)
POTASSIUM SERPL-SCNC: 4.2 MMOL/L (ref 3.5–5.1)
RBC # BLD AUTO: 4.88 M/UL (ref 4.23–5.6)
SODIUM SERPL-SCNC: 140 MMOL/L (ref 136–145)
WBC # BLD AUTO: 13.1 K/UL (ref 4.3–11.1)

## 2025-03-26 PROCEDURE — 85025 COMPLETE CBC W/AUTO DIFF WBC: CPT

## 2025-03-26 PROCEDURE — 97535 SELF CARE MNGMENT TRAINING: CPT

## 2025-03-26 PROCEDURE — 97530 THERAPEUTIC ACTIVITIES: CPT

## 2025-03-26 PROCEDURE — 36415 COLL VENOUS BLD VENIPUNCTURE: CPT

## 2025-03-26 PROCEDURE — 97161 PT EVAL LOW COMPLEX 20 MIN: CPT

## 2025-03-26 PROCEDURE — 94640 AIRWAY INHALATION TREATMENT: CPT

## 2025-03-26 PROCEDURE — 2500000003 HC RX 250 WO HCPCS: Performed by: FAMILY MEDICINE

## 2025-03-26 PROCEDURE — 6370000000 HC RX 637 (ALT 250 FOR IP): Performed by: HOSPITALIST

## 2025-03-26 PROCEDURE — 99222 1ST HOSP IP/OBS MODERATE 55: CPT | Performed by: NURSE PRACTITIONER

## 2025-03-26 PROCEDURE — 6370000000 HC RX 637 (ALT 250 FOR IP): Performed by: FAMILY MEDICINE

## 2025-03-26 PROCEDURE — 6360000002 HC RX W HCPCS: Performed by: HOSPITALIST

## 2025-03-26 PROCEDURE — 80048 BASIC METABOLIC PNL TOTAL CA: CPT

## 2025-03-26 PROCEDURE — 94760 N-INVAS EAR/PLS OXIMETRY 1: CPT

## 2025-03-26 PROCEDURE — 97165 OT EVAL LOW COMPLEX 30 MIN: CPT

## 2025-03-26 PROCEDURE — 6360000002 HC RX W HCPCS: Performed by: FAMILY MEDICINE

## 2025-03-26 PROCEDURE — 2700000000 HC OXYGEN THERAPY PER DAY

## 2025-03-26 PROCEDURE — 1100000000 HC RM PRIVATE

## 2025-03-26 RX ORDER — PREDNISONE 20 MG/1
40 TABLET ORAL DAILY
Status: DISCONTINUED | OUTPATIENT
Start: 2025-03-27 | End: 2025-03-27 | Stop reason: HOSPADM

## 2025-03-26 RX ADMIN — ARFORMOTEROL TARTRATE 15 MCG: 15 SOLUTION RESPIRATORY (INHALATION) at 07:16

## 2025-03-26 RX ADMIN — METHYLPREDNISOLONE SODIUM SUCCINATE 40 MG: 40 INJECTION, POWDER, LYOPHILIZED, FOR SOLUTION INTRAMUSCULAR; INTRAVENOUS at 07:41

## 2025-03-26 RX ADMIN — ASPIRIN 325 MG: 325 TABLET, FILM COATED ORAL at 07:41

## 2025-03-26 RX ADMIN — GUAIFENESIN 1200 MG: 600 TABLET ORAL at 21:13

## 2025-03-26 RX ADMIN — IPRATROPIUM BROMIDE AND ALBUTEROL SULFATE 1 DOSE: 2.5; .5 SOLUTION RESPIRATORY (INHALATION) at 11:01

## 2025-03-26 RX ADMIN — IPRATROPIUM BROMIDE AND ALBUTEROL SULFATE 1 DOSE: 2.5; .5 SOLUTION RESPIRATORY (INHALATION) at 19:03

## 2025-03-26 RX ADMIN — BUDESONIDE INHALATION 500 MCG: 0.5 SUSPENSION RESPIRATORY (INHALATION) at 07:16

## 2025-03-26 RX ADMIN — IPRATROPIUM BROMIDE AND ALBUTEROL SULFATE 1 DOSE: 2.5; .5 SOLUTION RESPIRATORY (INHALATION) at 15:09

## 2025-03-26 RX ADMIN — SODIUM CHLORIDE, PRESERVATIVE FREE 10 ML: 5 INJECTION INTRAVENOUS at 21:13

## 2025-03-26 RX ADMIN — ENOXAPARIN SODIUM 40 MG: 100 INJECTION SUBCUTANEOUS at 11:59

## 2025-03-26 RX ADMIN — ARFORMOTEROL TARTRATE 15 MCG: 15 SOLUTION RESPIRATORY (INHALATION) at 19:03

## 2025-03-26 RX ADMIN — SODIUM CHLORIDE, PRESERVATIVE FREE 10 ML: 5 INJECTION INTRAVENOUS at 07:41

## 2025-03-26 RX ADMIN — IPRATROPIUM BROMIDE AND ALBUTEROL SULFATE 1 DOSE: 2.5; .5 SOLUTION RESPIRATORY (INHALATION) at 07:16

## 2025-03-26 RX ADMIN — GUAIFENESIN 1200 MG: 600 TABLET ORAL at 07:41

## 2025-03-26 RX ADMIN — BUDESONIDE INHALATION 500 MCG: 0.5 SUSPENSION RESPIRATORY (INHALATION) at 19:03

## 2025-03-26 NOTE — PROGRESS NOTES
Physician Progress Note      PATIENT:               MELECIO COCHRAN  [facility] #:                  [id number]  :                       1955  ADMIT DATE:       3/24/2025 11:41 PM  DISCH DATE:  RESPONDING  PROVIDER #:        Marco Tracey DO          QUERY TEXT:    Patient admitted with COPD exacerbation.  If possible, please document in the   progress notes and discharge summary if you are evaluating and/or treating any   of the following:    The medical record reflects the following:  Risk Factors: age 69, functional quadriplegic, MS, COPD, former smoker  Clinical Indicators: Troponin 37---18. Patient admitted for COPD exacerbation.   Per ED record, spo2 on room air was 87%- arrived on NRB by EMS. ER exam   showed \"respiratory distress, decreased breath sounds and wheezing present.\"   Per H&P, \"COPD exacerbation.... currently requiring 2L/m supplemental o2...VBG   with normal pH at 7.39, pCO2 35.6.\"  EKG 3/25:  Sinus rhythm  Borderline prolonged MS interval  Left posterior fascicular block  Low voltage, precordial leads  Nonspecific T abnrm, anterolateral leads  ----  CXR 3/25: No acute findings in the chest    Treatment: labs, EKG, imaging, NRB-- 2-3LPM NC oxygen, albuterol, brovana,   pulmicort, duoneb, mucinex, IV solu-medrol  Options provided:  -- Type 2 MI  -- Demand Ischemia only, no MI  -- Other - I will add my own diagnosis  -- Disagree - Not applicable / Not valid  -- Disagree - Clinically unable to determine / Unknown  -- Refer to Clinical Documentation Reviewer    PROVIDER RESPONSE TEXT:    This patient has demand ischemia only, no MI.    Query created by: Ariella Posada on 3/26/2025 12:01 PM      Electronically signed by:  Marco Tracey DO 3/26/2025 1:48 PM

## 2025-03-26 NOTE — PROGRESS NOTES
Was reported in morning reported that patient was on 3 liters found him on room air sats 85% Patients oxygen saturations increased very quickly with neb left patient on 3 liters nasal cannula

## 2025-03-26 NOTE — PROGRESS NOTES
ACUTE OCCUPATIONAL THERAPY GOALS:   (Developed with and agreed upon by patient and/or caregiver.)  3. Patient will complete grooming ADL seated at sink with INDEPENDENCE.  5. Patient will complete functional transfers with SUPERVISION.    Timeframe: 7 visits     ALL GOALS MET 3/26/25    OCCUPATIONAL THERAPY Initial Assessment, Daily Note, and Discharge       OT Visit Days: 1  Acknowledge Orders  Time  OT Charge Capture  Rehab Caseload Tracker      Clinton Beckford is a 69 y.o. male   PRIMARY DIAGNOSIS: COPD exacerbation (HCC)  Hypoxemia [R09.02]  COPD exacerbation (HCC) [J44.1]       Reason for Referral: Generalized Muscle Weakness (M62.81)  Inpatient: Payor: HU[employer] MEDICARE / Plan: HU[employer] GOLD PLUS HMO / Product Type: *No Product type* /     ASSESSMENT:     REHAB RECOMMENDATIONS:   Recommendation to date pending progress:  Setting:  No further skilled occupational therapy after discharge from hospital    Equipment:    None--pt has hospital bed, electric w/c, sliding board, tub transfer bench at home     ASSESSMENT:  Mr. Beckford is a 68 y/o male presents with SOB, found to be in COPD exacerbation. Pt with PMH MS, does not walk, uses an electric wheelchair, scoots to transfer. At baseline pt lives with roommates, is mod I for ADLs, has all equipment needed at home. Pt does not wear home O2 and is currently requiring 3L O2 nasal cannula to maintain sats >90%. Today pt overall supervision for lateral scooting transfer bed>chair, which pt does at baseline. Pt then able to complete grooming ADLs seated at sink independently. Pt with no c/o dizziness, SOB throughout, appears to be functioning at his baseline. Pt does not need further skilled OT services during acute care stay or at d/c. Will d/c from caseload.      [facility] AM-PAC™ “6 Clicks” Daily Activity Inpatient Short Form:    AM-PAC Daily Activity - Inpatient   How much help is needed for putting on and taking off regular lower body clothing?:

## 2025-03-26 NOTE — THERAPY EVALUATION
ACUTE PHYSICAL THERAPY GOALS:   (Developed with and agreed upon by patient and/or caregiver.)  Clinton Beckford  will demonstrate sup<>sit transfer with MI using bedrails in 7 therapy sessions to improve bed mobility.  Clinton Beckford will demonstrate BTC MI with slideboard in 7 therapy sessions.  Clinton Beckford will tolerate 15 min of TA/TE in 7 therapy sessions to improve strength and endurance.      PHYSICAL THERAPY Initial Assessment, Daily Note, and AM  (Link to Caseload Tracking: PT Visit Days : 1  Acknowledge Orders  Time In/Out  PT Charge Capture  Rehab Caseload Tracker    Clinton Beckford is a 69 y.o. male   PRIMARY DIAGNOSIS: COPD exacerbation (HCC)  Hypoxemia [R09.02]  COPD exacerbation (HCC) [J44.1]       Reason for Referral: Generalized Muscle Weakness (M62.81)  Difficulty in walking, Not elsewhere classified (R26.2)  Other abnormalities of gait and mobility (R26.89)  Inpatient: Payor: HU[employer]NA MEDICARE / Plan: HU[employer] GOLD PLUS HMO / Product Type: *No Product type* /     ASSESSMENT:     REHAB RECOMMENDATIONS:   Recommendation to date pending progress:  Setting:  Outpatient Therapy    Equipment:    None patient has PWC, Tub transfer bench, slideboard     ASSESSMENT:  Mr. Beckford is found supine in bed upon PT/OT arrival in no apparent distress, agreeable to eval with NC intact but no O2 flowing, SPO2 86% but is able to inc to 91% with PLB. Patient is a 69 y.o. year old male admitted on 3/24/2025 for COPD exacerbation found to be SPO2 85% on RA with PMH of COPD and MS. He declines pain or feeling SOB despite low SPO2. He does not wear O2 at BL. Patient lives with friends in a 1 story apt and uses his PWC at BL, he is MI for transfers to shower bench and states he does occasional make an error and slip between surfaces. Patient requires SBA for sup>sit, and Justine for BTC transfer with cues for hand placement and sequencing. He next completes ADLs at the sink with OT. Throughout

## 2025-03-26 NOTE — CONSULTS
Nutrition Assessment  Assessment Type: Initial, Consult  Reason for visit:  General Nutrition Management/other reason (Hospitalists)  Malnutrition Screening Tool Score: 0    Nutrition Intervention:   Food and/or Nutrient Delivery:   Meals and Snacks:  Diet: Downgrade easy to chew per pt preference  Medical Food Supplements:   Medical food supplement therapy:  Initiate Ensure Plus High Protein (high calorie high protein) 350 calories, 20 grams protein per 8 ounce serving      Malnutrition Assessment:  Academy/A.S.P.E.N Clinical Malnutrition Criteria  Malnutrition Status: At risk for malnutrition (catabolic illness, variable intake pta)    Nutrition Focused Physical Exam: Unremarkable     Nutrition Assessment:  Food/Nutrition Related History: Pt reports he eats well at home. He stated he relies on meals on wheels or his roommate to prepare meals. He stated sometimes he is not ready to eat when his roommate is so he will not eat for that meal. He stated he receives two cases of Boost per month from his . Pt reports variable intake of Boost each day. He stated the 2 cases of Boost last him ~1 month.      Do You Have Any Cultural, Religion, or Ethnic Food Preferences?: No   Weight History: Pt is unsure of any recent wt changes. He stated at his primary doctor he is weighed in his power chair and they subtract the weight of his chair. He stated when he was last weighed at his primary care he weighed ~114lbs. He stated his pants have fit tighter lately. No EMR wt hx.   Nutrition Background:       PMH significant for COPD, MS, and functional quad. Pt admitted with COPD exacerbation.   Nutrition Monitoring/Evaluation:  Pt seen sitting in recliner. He reports hx as above. He stated for breakfast he ate all of his eggs, a little bit of sausage, and a little bit of oatmeal. Pt reports for lunch today he ate all of his pork, a few bites of potatoes, and one tomato. He reports the zucchini was hard for him to chew

## 2025-03-26 NOTE — CARE COORDINATION
MSN, cM:  patient was admitted for SOB with sats at 87% 9on room air and cough.  Patient was placed on oxygen and given neb tx.  Patient currently on 3L NC.  PT/OT working with patient for discharged recommendations.  Case Management will continue to follow.

## 2025-03-26 NOTE — CONSULTS
History and Physical/Surgical Consult   Clinton Beckford    Admit date: 3/24/2025    MRN: [id number]     : 1955     Age: 69 y.o.          3/26/2025 4:05 PM    Subjective/HPI:   This patient is a 69 y.o. seen and evaluated to discuss potential amputation. Pt is well known to our service and has been evaluated by Dr. Daniel and it was recommended he undergo bilateral above the knee amputations. He wanted to seek a second opinion but due to his health decline he has yet to follow through with his second opinion. He is a functional quadriplegia and also has MS. He was admitted for COPD exacerbation and is currently on oxygen. He states his breathing is much improved and he hopes to be discharged tomorrow.    PMH: COPD, weakness, MS, PVD, sacral ulcers, former smoker, h/o drug abuse    Review of Systems  Constitutional: negative  Respiratory: positive for dyspnea on exertion and shortness of breath  Cardiovascular: negative  Musculoskeletal:positive for myalgias  Past Medical History:   Diagnosis Date    Cellulitis     Chronic obstructive pulmonary disease (HCC)     Generalized weakness     Methamphetamine abuse (HCC)     Multiple sclerosis (HCC)     wheelchair bound; can't extend lower extremties    PVD (peripheral vascular disease)     has stent in groin    Sacral ulcer (HCC)     Skin ulcers of foot, bilateral (HCC)     chronic      Past Surgical History:   Procedure Laterality Date    VASCULAR SURGERY  2022    Ultrasound-guided access of the left common femoral artery with placement of a catheter in the aorta for aortogram.Bilateral lower extremity arteriograms.      No Known Allergies   Social History     Tobacco Use    Smoking status: Former     Current packs/day: 0.00     Types: Cigarettes     Quit date: 2020     Years since quittin.3    Smokeless tobacco: Never    Tobacco comments:     Quit smoking: continues non-smoking status   Substance Use Topics    Alcohol use: Never

## 2025-03-26 NOTE — PROGRESS NOTES
Student notes are for training only, and should not be used to guide medical decision making.      Medical Student Progress Note   Admit Date:  3/24/2025 11:41 PM   Name:  Clinton Beckford   Age:  69 y.o.  Sex:  male  :  1955   MRN:  [id number]   Room:  [facility]    Presenting Complaint: Shortness of Breath and Cough    Reason(s) for Admission: Hypoxemia [R09.02]  COPD exacerbation (HCC) [J44.1]     Hospital Course:   Clinton Beckford is a 69 y.o. male with medical history of COPD, multiple sclerosis, functional quadriplegia who presented to ED with shortness of breath.  Symptoms ongoing for the past few days.  Upon ER evaluation, patient hypoxic down to 87% on RA.  Currently requiring 2L/m supplemental O2.  WBC is 12.5.Initial trop was 37, repeat is 18.  VBG with normal pH at 7.39, pCO2 35.6.     3/25 Chest XR Findings: The lungs are clear. There are no infiltrates or effusions.  The heart  size is normal.    Subjective & 24-hour events:  Pt was seen and examined at beside. No acute overnight events. He is in good spirits, resting comfortably on 3L of O2 and has a good appetite. Pt denies chest pain at this time and states that he is breathing and SOB have improved with the treatment. States the overall effort of breathing, mucus and coughing has decreased. Pt stated that his PCP covers his COPD care and that he does not have a pulmonologist.     Pt states that he would like to go to transitional rehab instead of straight home, due to his functional quadriplegia.   Assessment & Plan:   COPD Exacerbation:   - Continue scheduled duonebs, PRN albuterol and IV solumedrol.   - Continue to try and wean off O2, as tolerated. Recommended oxygen saturation percentage is 88%-92%, due to increased hypoxic drive.   - Provide referral to outpatient pulmonologist to obtain PTFS, optimizing his maintenance therapy, preventing future exacerbations.    Multiple Sclerosis:   - Of note. Provide rehabilitation referral

## 2025-03-26 NOTE — PROGRESS NOTES
Hospitalist Progress Note   Admit Date:  3/24/2025 11:41 PM   Name:  Clinton Beckford   Age:  69 y.o.  Sex:  male  :  1955   MRN:  [id number]   Room:  [facility]    Presenting/Chief Complaint: Shortness of Breath and Cough     Reason(s) for Admission: Hypoxemia [R09.02]  COPD exacerbation (HCC) [J44.1]     Hospital Course:   Clinton Beckford is a 69 y.o. male with medical history of COPD, multiple sclerosis, functional quadriplegia who presented to ED with shortness of breath.  Symptoms ongoing for the past few days.  Upon ER evaluation, patient hypoxic down to 87% on RA.  Currently requiring 2L/m supplemental O2.  WBC is 12.5.  Initial trop was 37, repeat is 18.  VBG with normal pH at 7.39, pCO2 35.6.  CXR shows:  IMPRESSION:  No acute findings in the chest  Hospitalist asked to admit.    Subjective & 24hr Events:     Patient examined at bedside. No acute overnight events. Breathing \"is so much better than when I came in.\" No worsening cough. No chest pain. No fevers/chills.     Assessment & Plan:     Principal Problem:    COPD exacerbation (HCC)  - scheduled nebs  - switch Solumedrol to prednisone orally  - wean supplemental oxygen as tolerated  - needs to establish with pulmonology with repeat PFTs/spirometry following discharge      Functional quadriplegia (HCC)    Severe protein-calorie malnutrition    Multiple sclerosis (HCC)  - complicates course of hospitalization  - PT/OT ordered  - patient very open to SNF   - RD consult for severe protein malnutrition    Anticipated Discharge Arrangements:   Therapy has not evaluated yet    PT/OT evals ordered?  Therapy evals ordered  Diet:  ADULT DIET; Regular  VTE prophylaxis: Lovenox  Code status: Full Code      Non-peripheral Lines and Tubes (if present):          Telemetry (if present):  Cardiac/Telemetry Monitor On: No        Hospital Problems:  Principal Problem:    COPD exacerbation (HCC)  Active Problems:    COPD (chronic obstructive

## 2025-03-27 VITALS
WEIGHT: 135.9 LBS | DIASTOLIC BLOOD PRESSURE: 61 MMHG | BODY MASS INDEX: 24.08 KG/M2 | SYSTOLIC BLOOD PRESSURE: 110 MMHG | OXYGEN SATURATION: 98 % | HEIGHT: 63 IN | TEMPERATURE: 97.5 F | HEART RATE: 64 BPM | RESPIRATION RATE: 16 BRPM

## 2025-03-27 LAB
ANION GAP SERPL CALC-SCNC: 10 MMOL/L (ref 7–16)
BASOPHILS # BLD: 0.03 K/UL (ref 0–0.2)
BASOPHILS NFR BLD: 0.3 % (ref 0–2)
BUN SERPL-MCNC: 21 MG/DL (ref 8–23)
CALCIUM SERPL-MCNC: 8.9 MG/DL (ref 8.8–10.2)
CHLORIDE SERPL-SCNC: 106 MMOL/L (ref 98–107)
CO2 SERPL-SCNC: 24 MMOL/L (ref 20–29)
CREAT SERPL-MCNC: 0.71 MG/DL (ref 0.8–1.3)
DIFFERENTIAL METHOD BLD: ABNORMAL
EOSINOPHIL # BLD: 0.18 K/UL (ref 0–0.8)
EOSINOPHIL NFR BLD: 1.8 % (ref 0.5–7.8)
ERYTHROCYTE [DISTWIDTH] IN BLOOD BY AUTOMATED COUNT: 13.2 % (ref 11.9–14.6)
GLUCOSE SERPL-MCNC: 111 MG/DL (ref 70–99)
HCT VFR BLD AUTO: 42.2 % (ref 41.1–50.3)
HGB BLD-MCNC: 13.5 G/DL (ref 13.6–17.2)
IMM GRANULOCYTES # BLD AUTO: 0.04 K/UL (ref 0–0.5)
IMM GRANULOCYTES NFR BLD AUTO: 0.4 % (ref 0–5)
LYMPHOCYTES # BLD: 2.37 K/UL (ref 0.5–4.6)
LYMPHOCYTES NFR BLD: 24.1 % (ref 13–44)
MCH RBC QN AUTO: 29.4 PG (ref 26.1–32.9)
MCHC RBC AUTO-ENTMCNC: 32 G/DL (ref 31.4–35)
MCV RBC AUTO: 91.9 FL (ref 82–102)
MONOCYTES # BLD: 0.72 K/UL (ref 0.1–1.3)
MONOCYTES NFR BLD: 7.3 % (ref 4–12)
NEUTS SEG # BLD: 6.49 K/UL (ref 1.7–8.2)
NEUTS SEG NFR BLD: 66.1 % (ref 43–78)
NRBC # BLD: 0 K/UL (ref 0–0.2)
PLATELET # BLD AUTO: 252 K/UL (ref 150–450)
PMV BLD AUTO: 11.2 FL (ref 9.4–12.3)
POTASSIUM SERPL-SCNC: 4.6 MMOL/L (ref 3.5–5.1)
RBC # BLD AUTO: 4.59 M/UL (ref 4.23–5.6)
SODIUM SERPL-SCNC: 140 MMOL/L (ref 136–145)
WBC # BLD AUTO: 9.8 K/UL (ref 4.3–11.1)

## 2025-03-27 PROCEDURE — 6370000000 HC RX 637 (ALT 250 FOR IP): Performed by: INTERNAL MEDICINE

## 2025-03-27 PROCEDURE — 94761 N-INVAS EAR/PLS OXIMETRY MLT: CPT

## 2025-03-27 PROCEDURE — 85025 COMPLETE CBC W/AUTO DIFF WBC: CPT

## 2025-03-27 PROCEDURE — 6370000000 HC RX 637 (ALT 250 FOR IP): Performed by: FAMILY MEDICINE

## 2025-03-27 PROCEDURE — 36415 COLL VENOUS BLD VENIPUNCTURE: CPT

## 2025-03-27 PROCEDURE — 2700000000 HC OXYGEN THERAPY PER DAY

## 2025-03-27 PROCEDURE — 6370000000 HC RX 637 (ALT 250 FOR IP): Performed by: HOSPITALIST

## 2025-03-27 PROCEDURE — 6360000002 HC RX W HCPCS: Performed by: HOSPITALIST

## 2025-03-27 PROCEDURE — 94640 AIRWAY INHALATION TREATMENT: CPT

## 2025-03-27 PROCEDURE — 80048 BASIC METABOLIC PNL TOTAL CA: CPT

## 2025-03-27 PROCEDURE — 2500000003 HC RX 250 WO HCPCS: Performed by: FAMILY MEDICINE

## 2025-03-27 RX ORDER — METHYLPREDNISOLONE 4 MG/1
TABLET ORAL
Qty: 1 KIT | Refills: 0 | Status: SHIPPED | OUTPATIENT
Start: 2025-03-27 | End: 2025-04-02

## 2025-03-27 RX ADMIN — PREDNISONE 40 MG: 20 TABLET ORAL at 08:42

## 2025-03-27 RX ADMIN — GUAIFENESIN 1200 MG: 600 TABLET ORAL at 08:39

## 2025-03-27 RX ADMIN — SODIUM CHLORIDE, PRESERVATIVE FREE 10 ML: 5 INJECTION INTRAVENOUS at 08:42

## 2025-03-27 RX ADMIN — ARFORMOTEROL TARTRATE 15 MCG: 15 SOLUTION RESPIRATORY (INHALATION) at 08:05

## 2025-03-27 RX ADMIN — ASPIRIN 325 MG: 325 TABLET, FILM COATED ORAL at 08:42

## 2025-03-27 RX ADMIN — IPRATROPIUM BROMIDE AND ALBUTEROL SULFATE 1 DOSE: 2.5; .5 SOLUTION RESPIRATORY (INHALATION) at 08:05

## 2025-03-27 RX ADMIN — BUDESONIDE INHALATION 500 MCG: 0.5 SUSPENSION RESPIRATORY (INHALATION) at 08:05

## 2025-03-27 NOTE — DISCHARGE SUMMARY
Hospitalist Discharge Summary   Admit Date:  3/24/2025 11:41 PM   DC Note date: 3/27/2025  Name:  Clinton Beckford   Age:  69 y.o.  Sex:  male  :  1955   MRN:  [id number]   Room:  [facility]  PCP:  Unknown, Provider, ANP    Presenting Complaint: Shortness of Breath and Cough     Initial Admission Diagnosis: Hypoxemia [R09.02]  COPD exacerbation (HCC) [J44.1]     Problem List for this Hospitalization (present on admission):    Principal Problem:    COPD exacerbation (HCC)  Active Problems:    COPD (chronic obstructive pulmonary disease) (HCC)    Functional quadriplegia (HCC)    Severe protein-calorie malnutrition    Multiple sclerosis (HCC)    Encounter for palliative care  Resolved Problems:    * No resolved hospital problems. *    Clinton Beckford is a 69 y.o. male with medical history of COPD, multiple sclerosis, functional quadriplegia who presented to ED with shortness of breath.  Symptoms ongoing for the past few days.  Upon ER evaluation, patient hypoxic down to 87% on RA.  Currently requiring 2L/m supplemental O2.  WBC is 12.5.  Initial trop was 37, repeat is 18.  VBG with normal pH at 7.39, pCO2 35.6.  CXR shows:  IMPRESSION:  No acute findings in the chest  Hospitalist asked to admit.    Interval History (3/27): patient examined at bedside. No acute overnight events. Breathing is improved. No chest pain. No nausea/vomiting or diarrhea.     Hospital Course:  Patient admitted for COPD exacerbation. Started on steroids and nebulizers with clinical improvement. Met with vascular surgery to discuss options for amputations given chronic contractures in the setting of known MS, he will follow-up as outpatient. He has improved clinically and is stable for hospital discharge home. Details of hospitalization discussed with patient at bedside who understands and agrees with plan of care and discharge home. Return precautions provided. All questions answered.     Disposition: Home  Diet: No diet orders

## 2025-03-27 NOTE — DISCHARGE INSTRUCTIONS
DISCHARGE SUMMARY from Nurse    PATIENT INSTRUCTIONS:    What to do at Home:  Recommended activity: activity as tolerated,       *  Please give a list of your current medications to your Primary Care Provider.    *  Please update this list whenever your medications are discontinued, doses are      changed, or new medications (including over-the-counter products) are added.    *  Please carry medication information at all times in case of emergency situations.    These are general instructions for a healthy lifestyle:    No smoking/ No tobacco products/ Avoid exposure to second hand smoke  Surgeon General's Warning:  Quitting smoking now greatly reduces serious risk to your health.    Obesity, smoking, and sedentary lifestyle greatly increases your risk for illness    A healthy diet, regular physical exercise & weight monitoring are important for maintaining a healthy lifestyle    You may be retaining fluid if you have a history of heart failure or if you experience any of the following symptoms:  Weight gain of 3 pounds or more overnight or 5 pounds in a week, increased swelling in our hands or feet or shortness of breath while lying flat in bed.  Please call your doctor as soon as you notice any of these symptoms; do not wait until your next office visit.         Oxygen Therapy: Care Instructions  Overview    Oxygen therapy helps you get more oxygen into your lungs and bloodstream. You may use it if you have a disease that makes it hard to breathe, such as COPD, pulmonary fibrosis (scarring of the lungs), or heart failure. Oxygen therapy can make it easier for you to breathe and can reduce your heart's workload.  Some people need extra oxygen all the time. Others need it from time to time throughout the day or overnight. A doctor will prescribe how much oxygen you need and how often to use it.  To breathe the oxygen, most people use a nasal cannula (say \"ASHLYN-yuh-deanna\"). This is a thin, plastic tube with two prongs

## 2025-03-27 NOTE — PROGRESS NOTES
Room air sat recheck and found to be 92%.     IV removed. Verbalized understanding of d/c instructions including prescription use and follow up appointments. Has all personal belongings.   Awaiting transport service to transfer home.

## 2025-03-27 NOTE — PROGRESS NOTES
03/27/25 0956   Resting (Room Air)   SpO2 92   HR 74   During Walk (Room Air)   Comments Patient has MS and he does not walk uses a morotized scooter   After Walk   Does the Patient Qualify for Home O2 No   Does the Patient Need Portable Oxygen Tanks No     Patient does not walk he uses a motorized scooter was able to maintain his oxygen saturations on room air above 90% no home oxygen needed

## 2025-03-27 NOTE — CARE COORDINATION
MSN, cM:  patient to be discharged home today with [facility] and [facility].  Patient and family agree with this discharge plan.  Patient has met all milestones for this admission.  [facility] to transport patient home.       03/27/25 0905   Service Assessment   Patient Orientation Alert and Oriented   Cognition Alert   Services At/After Discharge   Transition of Care Consult (CM Consult) Home Health;Other  ([facility] and [facility])   Internal Home Health No   Reason Outside Agency Chosen Patient already serviced by other home care/hospice agency   Services At/After Discharge PT;OT;Nursing services   Mode of Transport at Discharge BLS  ([facility])   Hospital Transport Time of Discharge 1030   Confirm Follow Up Transport Other (see comment)  (EMS)   Condition of Participation: Discharge Planning   The Plan for Transition of Care is related to the following treatment goals: Home Health and Palliative Care   The Patient and/or Patient Representative was provided with a Choice of Provider? Patient   The Patient and/Or Patient Representative agree with the Discharge Plan? Yes   Freedom of Choice list was provided with basic dialogue that supports the patient's individualized plan of care/goals, treatment preferences, and shares the quality data associated with the providers?  Yes

## 2025-03-27 NOTE — PLAN OF CARE
Problem: Discharge Planning  Goal: Discharge to home or other facility with appropriate resources  3/27/2025 0848 by Lima Wright RN  Outcome: Adequate for Discharge  3/26/2025 2147 by Karolyn Oneal RN  Outcome: Progressing  Flowsheets (Taken 3/26/2025 1915)  Discharge to home or other facility with appropriate resources: Identify barriers to discharge with patient and caregiver     Problem: Skin/Tissue Integrity  Goal: Skin integrity remains intact  Description: 1.  Monitor for areas of redness and/or skin breakdown  2.  Assess vascular access sites hourly  3.  Every 4-6 hours minimum:  Change oxygen saturation probe site  4.  Every 4-6 hours:  If on nasal continuous positive airway pressure, respiratory therapy assess nares and determine need for appliance change or resting period  3/27/2025 0848 by Lima Wright RN  Outcome: Adequate for Discharge  3/26/2025 2147 by Karolyn Oneal RN  Outcome: Progressing  Flowsheets (Taken 3/26/2025 1915)  Skin Integrity Remains Intact: Monitor for areas of redness and/or skin breakdown     Problem: Safety - Adult  Goal: Free from fall injury  3/27/2025 0848 by Lima Wright RN  Outcome: Adequate for Discharge  3/26/2025 2147 by Karolyn Oneal RN  Outcome: Progressing     Problem: Respiratory - Adult  Goal: Achieves optimal ventilation and oxygenation  3/27/2025 0848 by Lima Wright RN  Outcome: Adequate for Discharge  3/26/2025 2147 by Karolyn Oneal RN  Outcome: Progressing

## 2025-03-28 ENCOUNTER — FOLLOWUP TELEPHONE ENCOUNTER (OUTPATIENT)
Dept: CASE MANAGEMENT | Age: 70
End: 2025-03-28

## 2025-03-28 NOTE — PROGRESS NOTES
This RN Navigator attempted to reach patient by phone for post discharge follow up. No answer. VM left asking patient to call this RN Navigator back. Will attempt to reach patient again.    This RN Navigator will continue to follow.

## 2025-03-31 ENCOUNTER — FOLLOWUP TELEPHONE ENCOUNTER (OUTPATIENT)
Dept: CASE MANAGEMENT | Age: 70
End: 2025-03-31

## 2025-03-31 NOTE — PROGRESS NOTES
This RN Navigator attempted to reach patient by phone for post discharge follow up. No answer, VM left asking patient to call this RN Navigator. Will attempt to reach again.    This RN Navigator will continue to follow.

## 2025-04-04 ENCOUNTER — FOLLOWUP TELEPHONE ENCOUNTER (OUTPATIENT)
Dept: CASE MANAGEMENT | Age: 70
End: 2025-04-04

## 2025-04-04 NOTE — PROGRESS NOTES
This RN Navigator attempted to reach patient by phone for post discharge/ weekly follow up. No answer, VM left asking patient to call this RN Navigator back. Will attempt to reach patient again.     This RN Navigator will continue to follow.

## 2025-04-07 ENCOUNTER — FOLLOWUP TELEPHONE ENCOUNTER (OUTPATIENT)
Dept: CASE MANAGEMENT | Age: 70
End: 2025-04-07

## 2025-04-07 NOTE — PROGRESS NOTES
This RN Navigator attempted to reach patient by phone for post discharge/weekly follow up. No answer, VM left asking patient to call this RN Navigator back. Attempted to reach patient through emergency contact. No answer, VM left. Will attempt to reach patient again.     This RN Navigator will continue to follow.

## 2025-04-07 NOTE — PROGRESS NOTES
Patient returned this RN Navigator's phone call. Patient states he had an appointment with a different provider with his PCP's office on 3/28 and they prescribed him a Trelegy inhaler and that has helped his breathing a lot.    Patient denies any exacerbation of COPD symptoms at this time.     Patient states current adequate access to medications and refills. No need for assistance obtaining medications at this time.      Patient seems to be compliant with plan of care and education provided.      Patient encouraged to keep/make routine appointments with PCP and keep vaccinations current. Patient denies any needs from this RN Navigator at this time.     This RN Navigator will continue to follow.

## 2025-04-11 ENCOUNTER — OFFICE VISIT (OUTPATIENT)
Dept: VASCULAR SURGERY | Age: 70
End: 2025-04-11

## 2025-04-11 ENCOUNTER — FOLLOWUP TELEPHONE ENCOUNTER (OUTPATIENT)
Dept: CASE MANAGEMENT | Age: 70
End: 2025-04-11

## 2025-04-11 VITALS
WEIGHT: 130 LBS | SYSTOLIC BLOOD PRESSURE: 137 MMHG | HEIGHT: 63 IN | BODY MASS INDEX: 23.04 KG/M2 | HEART RATE: 65 BPM | OXYGEN SATURATION: 93 % | DIASTOLIC BLOOD PRESSURE: 69 MMHG

## 2025-04-11 DIAGNOSIS — I73.9 PAD (PERIPHERAL ARTERY DISEASE): Primary | Chronic | ICD-10-CM

## 2025-04-11 NOTE — PROGRESS NOTES
This RN Navigator spoke with patient by phone for weekly follow up. Patient states he is doing well and feels ok. Denies any symptoms of COPD exacerbation. Patient reports he has no needs in making appointments or getting medications and has refills for what he needs. Denies any further needs from this RN Navigator at this time.    This RN Navigator will continue to follow.

## 2025-04-11 NOTE — PROGRESS NOTES
DATE OF VISIT: 4/11/2025      [facility]  Clinton Beckford is a 69 y.o. male and evaluated to discuss potential amputation. Pt is well known to our service and has been evaluated by Dr. Daniel and it was recommended he undergo bilateral above the knee amputations. He wanted to seek a second opinion but due to his health decline he has yet to follow through with his second opinion. He is a functional quadriplegia and also has MS. He still c/o right leg pain but mostly when he lays down.        MEDICAL HISTORY:   Past Medical History:   Diagnosis Date    Cellulitis     Chronic obstructive pulmonary disease (HCC)     Generalized weakness     Methamphetamine abuse     Multiple sclerosis (HCC)     wheelchair bound; can't extend lower extremties    PVD (peripheral vascular disease)     has stent in groin    Sacral ulcer (HCC)     Skin ulcers of foot, bilateral (HCC)     chronic         SURGICAL HISTORY:   Past Surgical History:   Procedure Laterality Date    VASCULAR SURGERY  03/07/2022    Ultrasound-guided access of the left common femoral artery with placement of a catheter in the aorta for aortogram.Bilateral lower extremity arteriograms.       Review of Systems:  Constitutional:   Negative for fevers and unexplained weight loss.  Respiratory:   Negative for hemoptysis.  Cardiovascular:   Negative except as noted in HPI.  Musculoskeletal:  Negative for active, unexplained/severe joint pain.      ALLERGIES: No Known Allergies    CURRENT MEDICATIONS:  Current Outpatient Medications   Medication Sig Dispense Refill    carvedilol (COREG) 3.125 MG tablet Take 1 tablet by mouth 2 times daily (with meals)      dapagliflozin (FARXIGA) 5 MG tablet Take 1 tablet by mouth every morning      vitamin D (ERGOCALCIFEROL) 1.25 MG (27725 UT) CAPS capsule Take 1 capsule by mouth once a week      spironolactone (ALDACTONE) 25 MG tablet Take 1 tablet by mouth daily      atorvastatin (LIPITOR) 20 MG tablet Take 1 tablet by mouth

## 2025-04-18 ENCOUNTER — FOLLOWUP TELEPHONE ENCOUNTER (OUTPATIENT)
Dept: CASE MANAGEMENT | Age: 70
End: 2025-04-18

## 2025-04-18 NOTE — PROGRESS NOTES
This RN Navigator spoke with patient through text message for follow up. Patient texted this RN Navigator stating he is doing well. States he sees vascular with abdulaziz today and that he is breathing well. Patient denies any needs from this RN Navigator per his text message.     This RN Navigator will continue to follow.

## 2025-04-25 ENCOUNTER — FOLLOWUP TELEPHONE ENCOUNTER (OUTPATIENT)
Dept: CASE MANAGEMENT | Age: 70
End: 2025-04-25

## 2025-04-25 NOTE — PROGRESS NOTES
This RN Navigator attempted to reach patient by phone for weekly follow up. No answer, VM left asking patient to call this RN Navigator back.     This RN Navigator will continue to follow until 4/27/2025.

## 2025-04-25 NOTE — PROGRESS NOTES
Patient sent this RN Navigator text message stating he is doing well and breathing well. Patient denies any symptoms of COPD exacerbation. Patient state he has all of his medications and denies any needs from this RN Navigator at this time.    This RN Navigator will continue to follow until 4/27/2025.

## 2025-04-28 ENCOUNTER — FOLLOWUP TELEPHONE ENCOUNTER (OUTPATIENT)
Dept: CASE MANAGEMENT | Age: 70
End: 2025-04-28

## 2025-04-28 NOTE — PROGRESS NOTES
This RN navigator has completed following patient for 30 days post-discharge, to prevent hospital readmission. This RN navigator will no longer be following.

## (undated) DEVICE — TOWEL SURG W16XL26IN BLU NONFENESTRATED NONSTERILE 400 PER CA

## (undated) DEVICE — MAJOR VASCULAR: Brand: MEDLINE INDUSTRIES, INC.

## (undated) DEVICE — STOCKINETTE,DOUBLE PLY,6X48,STERILE: Brand: MEDLINE

## (undated) DEVICE — Device

## (undated) DEVICE — PAD THRM L UNIV NONSLIP HYDRGEL RECT PROVIDE OPTIMAL ENERGY

## (undated) DEVICE — TUBING, SUCTION, 1/4" X 10', STRAIGHT: Brand: MEDLINE

## (undated) DEVICE — APPLIER CLP L9.38IN M LIG TI DISP STR RNG HNDL LIGACLP

## (undated) DEVICE — DRAPE IRRIG FLD WRM W44XL44IN W/ AORN STD PRTBL INTRATEMP

## (undated) DEVICE — CRADLE POS 3X5X24IN RASPBERRY ARM PRONE FOAM DISP

## (undated) DEVICE — PLEDGET VASC W3/16XL3/8IN THK1/16IN PTFE SFT

## (undated) DEVICE — TTL1LYR 16FR10ML 100%SIL TMPST TR: Brand: MEDLINE